# Patient Record
Sex: MALE | Race: WHITE | Employment: FULL TIME | URBAN - METROPOLITAN AREA
[De-identification: names, ages, dates, MRNs, and addresses within clinical notes are randomized per-mention and may not be internally consistent; named-entity substitution may affect disease eponyms.]

---

## 2017-01-25 RX ORDER — METOPROLOL SUCCINATE 50 MG/1
TABLET, EXTENDED RELEASE ORAL
Qty: 30 TABLET | Refills: 5 | Status: SHIPPED | OUTPATIENT
Start: 2017-01-25 | End: 2017-07-15

## 2017-01-25 RX ORDER — ATORVASTATIN CALCIUM 10 MG/1
TABLET, FILM COATED ORAL
Qty: 30 TABLET | Refills: 5 | Status: SHIPPED | OUTPATIENT
Start: 2017-01-25 | End: 2017-07-15

## 2017-01-25 RX ORDER — TAMSULOSIN HYDROCHLORIDE 0.4 MG/1
CAPSULE ORAL
Qty: 30 CAPSULE | Refills: 5 | Status: SHIPPED | OUTPATIENT
Start: 2017-01-25 | End: 2017-07-12 | Stop reason: SDUPTHER

## 2017-02-28 ENCOUNTER — OFFICE VISIT (OUTPATIENT)
Dept: FAMILY MEDICINE CLINIC | Age: 67
End: 2017-02-28

## 2017-02-28 VITALS
HEART RATE: 90 BPM | TEMPERATURE: 97.4 F | RESPIRATION RATE: 12 BRPM | WEIGHT: 275.2 LBS | DIASTOLIC BLOOD PRESSURE: 62 MMHG | HEIGHT: 69 IN | BODY MASS INDEX: 40.76 KG/M2 | SYSTOLIC BLOOD PRESSURE: 110 MMHG

## 2017-02-28 DIAGNOSIS — K57.92 ACUTE DIVERTICULITIS OF INTESTINE: Primary | ICD-10-CM

## 2017-02-28 DIAGNOSIS — K57.30 DIVERTICULOSIS OF LARGE INTESTINE WITHOUT HEMORRHAGE: ICD-10-CM

## 2017-02-28 DIAGNOSIS — E66.01 MORBID OBESITY DUE TO EXCESS CALORIES (HCC): ICD-10-CM

## 2017-02-28 DIAGNOSIS — E78.5 DYSLIPIDEMIA: ICD-10-CM

## 2017-02-28 DIAGNOSIS — I25.10 CAD IN NATIVE ARTERY: ICD-10-CM

## 2017-02-28 DIAGNOSIS — I10 ESSENTIAL HYPERTENSION: ICD-10-CM

## 2017-02-28 PROCEDURE — 99214 OFFICE O/P EST MOD 30 MIN: CPT | Performed by: FAMILY MEDICINE

## 2017-02-28 RX ORDER — HYDROCODONE BITARTRATE AND ACETAMINOPHEN 5; 325 MG/1; MG/1
1 TABLET ORAL EVERY 6 HOURS PRN
Qty: 60 TABLET | Refills: 0 | Status: SHIPPED | OUTPATIENT
Start: 2017-02-28 | End: 2017-07-15 | Stop reason: SDUPTHER

## 2017-02-28 RX ORDER — SULFAMETHOXAZOLE AND TRIMETHOPRIM 800; 160 MG/1; MG/1
1 TABLET ORAL 2 TIMES DAILY
Qty: 20 TABLET | Refills: 0 | Status: SHIPPED | OUTPATIENT
Start: 2017-02-28 | End: 2017-03-10

## 2017-02-28 RX ORDER — METRONIDAZOLE 500 MG/1
500 TABLET ORAL 3 TIMES DAILY
Qty: 30 TABLET | Refills: 0 | Status: SHIPPED | OUTPATIENT
Start: 2017-02-28 | End: 2017-03-10

## 2017-02-28 RX ORDER — LISINOPRIL 20 MG/1
20 TABLET ORAL 2 TIMES DAILY
COMMUNITY

## 2017-02-28 ASSESSMENT — PATIENT HEALTH QUESTIONNAIRE - PHQ9
SUM OF ALL RESPONSES TO PHQ QUESTIONS 1-9: 0
SUM OF ALL RESPONSES TO PHQ9 QUESTIONS 1 & 2: 0
2. FEELING DOWN, DEPRESSED OR HOPELESS: 0
1. LITTLE INTEREST OR PLEASURE IN DOING THINGS: 0

## 2017-07-12 RX ORDER — TAMSULOSIN HYDROCHLORIDE 0.4 MG/1
CAPSULE ORAL
Qty: 30 CAPSULE | Refills: 5 | Status: SHIPPED | OUTPATIENT
Start: 2017-07-12 | End: 2018-01-18 | Stop reason: SDUPTHER

## 2017-07-15 ENCOUNTER — OFFICE VISIT (OUTPATIENT)
Dept: FAMILY MEDICINE CLINIC | Age: 67
End: 2017-07-15

## 2017-07-15 VITALS
HEART RATE: 68 BPM | DIASTOLIC BLOOD PRESSURE: 70 MMHG | RESPIRATION RATE: 16 BRPM | WEIGHT: 262 LBS | HEIGHT: 69 IN | BODY MASS INDEX: 38.8 KG/M2 | TEMPERATURE: 98.8 F | SYSTOLIC BLOOD PRESSURE: 110 MMHG

## 2017-07-15 DIAGNOSIS — R58 ECCHYMOSIS OF FOREARM: ICD-10-CM

## 2017-07-15 DIAGNOSIS — R58 ECCHYMOSIS OF FOREARM: Primary | ICD-10-CM

## 2017-07-15 DIAGNOSIS — T50.905A MEDICATION SIDE EFFECT, INITIAL ENCOUNTER: ICD-10-CM

## 2017-07-15 LAB
APTT: 27.4 SEC (ref 21.6–35.4)
BASOPHILS ABSOLUTE: 0 K/UL (ref 0–0.2)
BASOPHILS RELATIVE PERCENT: 0.4 %
EOSINOPHILS ABSOLUTE: 0.1 K/UL (ref 0–0.7)
EOSINOPHILS RELATIVE PERCENT: 0.9 %
HCT VFR BLD CALC: 45 % (ref 42–52)
HEMOGLOBIN: 14.9 G/DL (ref 14–18)
INR BLD: 1
LYMPHOCYTES ABSOLUTE: 1.9 K/UL (ref 1–4.8)
LYMPHOCYTES RELATIVE PERCENT: 20.4 %
MCH RBC QN AUTO: 28.5 PG (ref 27–31.3)
MCHC RBC AUTO-ENTMCNC: 33.2 % (ref 33–37)
MCV RBC AUTO: 86 FL (ref 80–100)
MONOCYTES ABSOLUTE: 1 K/UL (ref 0.2–0.8)
MONOCYTES RELATIVE PERCENT: 10.3 %
NEUTROPHILS ABSOLUTE: 6.4 K/UL (ref 1.4–6.5)
NEUTROPHILS RELATIVE PERCENT: 68 %
PDW BLD-RTO: 14.7 % (ref 11.5–14.5)
PLATELET # BLD: 226 K/UL (ref 130–400)
PROTHROMBIN TIME: 10.5 SEC (ref 8.1–13.7)
RBC # BLD: 5.23 M/UL (ref 4.7–6.1)
WBC # BLD: 9.4 K/UL (ref 4.8–10.8)

## 2017-07-15 PROCEDURE — 99213 OFFICE O/P EST LOW 20 MIN: CPT | Performed by: FAMILY MEDICINE

## 2017-07-15 RX ORDER — ATORVASTATIN CALCIUM 40 MG/1
1 TABLET, FILM COATED ORAL NIGHTLY
Refills: 11 | COMMUNITY
Start: 2017-07-08 | End: 2017-10-25 | Stop reason: ALTCHOICE

## 2017-07-15 RX ORDER — HYDROCODONE BITARTRATE AND ACETAMINOPHEN 5; 325 MG/1; MG/1
1 TABLET ORAL EVERY 6 HOURS PRN
Qty: 60 TABLET | Refills: 0 | Status: SHIPPED | OUTPATIENT
Start: 2017-07-15 | End: 2017-10-25 | Stop reason: SDUPTHER

## 2017-07-15 RX ORDER — CLOPIDOGREL BISULFATE 75 MG/1
1 TABLET ORAL DAILY
Refills: 5 | COMMUNITY
Start: 2017-07-08 | End: 2018-01-25 | Stop reason: ALTCHOICE

## 2017-07-15 RX ORDER — METOPROLOL SUCCINATE 100 MG/1
1 TABLET, EXTENDED RELEASE ORAL NIGHTLY
Refills: 3 | COMMUNITY
Start: 2017-07-08

## 2017-07-15 RX ORDER — IPRATROPIUM/ALBUTEROL SULFATE 20-100 MCG
MIST INHALER (GRAM) INHALATION
Refills: 3 | COMMUNITY
Start: 2017-07-08 | End: 2017-07-15

## 2017-07-25 ENCOUNTER — OFFICE VISIT (OUTPATIENT)
Dept: FAMILY MEDICINE CLINIC | Age: 67
End: 2017-07-25

## 2017-07-25 VITALS
DIASTOLIC BLOOD PRESSURE: 74 MMHG | HEART RATE: 76 BPM | RESPIRATION RATE: 16 BRPM | BODY MASS INDEX: 38.66 KG/M2 | TEMPERATURE: 98.1 F | WEIGHT: 261 LBS | SYSTOLIC BLOOD PRESSURE: 122 MMHG | HEIGHT: 69 IN

## 2017-07-25 DIAGNOSIS — S56.911D FOREARM STRAIN, RIGHT, SUBSEQUENT ENCOUNTER: Primary | ICD-10-CM

## 2017-07-25 DIAGNOSIS — R58 ECCHYMOSIS OF FOREARM: ICD-10-CM

## 2017-07-25 PROCEDURE — 99213 OFFICE O/P EST LOW 20 MIN: CPT | Performed by: FAMILY MEDICINE

## 2017-10-25 ENCOUNTER — OFFICE VISIT (OUTPATIENT)
Dept: FAMILY MEDICINE CLINIC | Age: 67
End: 2017-10-25

## 2017-10-25 VITALS
BODY MASS INDEX: 40.49 KG/M2 | OXYGEN SATURATION: 95 % | DIASTOLIC BLOOD PRESSURE: 72 MMHG | HEIGHT: 69 IN | SYSTOLIC BLOOD PRESSURE: 128 MMHG | HEART RATE: 63 BPM | TEMPERATURE: 97.8 F | WEIGHT: 273.4 LBS

## 2017-10-25 DIAGNOSIS — M70.61 GREATER TROCHANTERIC BURSITIS OF RIGHT HIP: ICD-10-CM

## 2017-10-25 DIAGNOSIS — E78.5 DYSLIPIDEMIA: ICD-10-CM

## 2017-10-25 DIAGNOSIS — I48.19 PERSISTENT ATRIAL FIBRILLATION (HCC): ICD-10-CM

## 2017-10-25 DIAGNOSIS — I10 ESSENTIAL HYPERTENSION: Primary | ICD-10-CM

## 2017-10-25 DIAGNOSIS — I25.10 CAD IN NATIVE ARTERY: ICD-10-CM

## 2017-10-25 PROCEDURE — 20610 DRAIN/INJ JOINT/BURSA W/O US: CPT | Performed by: FAMILY MEDICINE

## 2017-10-25 PROCEDURE — 99214 OFFICE O/P EST MOD 30 MIN: CPT | Performed by: FAMILY MEDICINE

## 2017-10-25 RX ORDER — METHYLPREDNISOLONE ACETATE 40 MG/ML
40 INJECTION, SUSPENSION INTRA-ARTICULAR; INTRALESIONAL; INTRAMUSCULAR; SOFT TISSUE ONCE
Status: COMPLETED | OUTPATIENT
Start: 2017-10-25 | End: 2017-10-25

## 2017-10-25 RX ORDER — LIDOCAINE HYDROCHLORIDE 10 MG/ML
2 INJECTION, SOLUTION INFILTRATION; PERINEURAL ONCE
Status: COMPLETED | OUTPATIENT
Start: 2017-10-25 | End: 2017-10-25

## 2017-10-25 RX ORDER — HYDROCODONE BITARTRATE AND ACETAMINOPHEN 5; 325 MG/1; MG/1
1 TABLET ORAL EVERY 6 HOURS PRN
Qty: 60 TABLET | Refills: 0 | Status: SHIPPED | OUTPATIENT
Start: 2017-10-25 | End: 2018-05-04 | Stop reason: SDUPTHER

## 2017-10-25 RX ADMIN — LIDOCAINE HYDROCHLORIDE 2 ML: 10 INJECTION, SOLUTION INFILTRATION; PERINEURAL at 10:40

## 2017-10-25 RX ADMIN — METHYLPREDNISOLONE ACETATE 40 MG: 40 INJECTION, SUSPENSION INTRA-ARTICULAR; INTRALESIONAL; INTRAMUSCULAR; SOFT TISSUE at 10:42

## 2017-10-25 NOTE — PROGRESS NOTES
Chief Complaint   Patient presents with    3 Month Follow-Up     LOV 07/25/17    Hypertension    Bleeding/Bruising     RT forearm    Discuss Medications     HPI: Tanika Cowan is a 79 y.o. male presenting for follow-up of HTN and Ecchymosis of RT forearm. I last saw the patient 3 months ago. He is bruising but not as severe as he was. He is getting some white phlegm in the back of his throat that is lumpy. He notes that it will inhibit food and get caught. It may cause him to vomit. He does complain of pain in the right hip. It does affect his sleeping. Pain is in the right greater trochanteric bursa. He is scheduled to see his cardiologist on 11-7. His right forearm has improved but there is still some mild discoloration of the skin. Past Medical History:   Diagnosis Date    Chest pain 5/20/2014    Diverticulosis     Dyslipidemia 5/20/2014    Dyspnea on exertion     Family history of premature CAD 5/20/2014    Fatty infiltration of liver     Hypertension     Obesity        Past Surgical History:   Procedure Laterality Date    CARPAL TUNNEL RELEASE  2002    LEFT    CATARACT REMOVAL WITH IMPLANT Right 05/2016    Dr. Caroline Romero COLONOSCOPY  12/29/2016    [facility]         family history includes Cancer in his father; Other in an other family member. Social History     Social History    Marital status:      Spouse name: N/A    Number of children: N/A    Years of education: N/A     Occupational History    Not on file.      Social History Main Topics    Smoking status: Former Smoker     Packs/day: 2.00     Years: 25.00     Types: Cigarettes     Quit date: 4/20/2000    Smokeless tobacco: Never Used    Alcohol use No    Drug use: No    Sexual activity: Not on file     Other Topics Concern    Not on file     Social History Narrative    No narrative on file       No Known Allergies    Review of Systems - General ROS: negative  Psychological ROS: negative  ENT ROS: positive for - sinus drainage with thick white mucus causing vomiting and inability to eat  Hematological and Lymphatic ROS: positive for excessive bruising  Respiratory ROS: positive for - shortness of breath  Cardiovascular ROS: no chest pain or dyspnea on exertion  Gastrointestinal ROS: no abdominal pain, change in bowel habits, or black or bloody stools  Genito-Urinary ROS: no dysuria, trouble voiding, or hematuria  Musculoskeletal ROS: negative  Neurological ROS: no TIA or stroke symptoms  Dermatological ROS: negative    Vitals:    10/25/17 0913 10/25/17 0938   BP: 120/78 128/72   Pulse: 63    Temp: 97.8 °F (36.6 °C)    TempSrc: Temporal    SpO2: 95%    Weight: 273 lb 6.4 oz (124 kg)    Height: 5' 9\" (1.753 m)      Physical Examination: General appearance - alert, well appearing, and in no distress. Patient is obese. Skin - normal coloration and turgor, no rashes, no suspicious skin lesions noted  Normal  Neck - supple, no significant adenopathy, bilateral symmetric anterior adenopathy, carotids upstroke normal bilaterally, no bruits  Chest - clear to auscultation, no wheezes, rales or rhonchi, symmetric air entry  Heart - normal rate, regular rhythm, normal S1, S2, no murmurs, rubs, clicks or gallops  Abdomen - soft, nondistended, nontender. No masses or organomegaly noted. Bowel sounds are positive. Obese. Right hip - normal straight leg raising except tight hamstrings. Pain to palpation over the right lateral hip at the greater trochanter. Extremities - peripheral pulses normal, no pedal edema, no clubbing or cyanosis. Mild tan discoloration of the volar right forearm. Procedure: Discussed procedure with patient as well as risks and complications and patient agreed to proceed. Right greater trochanter was prepped with alcohol/betadine. Then the injection was given into the greater trochanteric bursa using a lateral approach with 40 mg of depomedrol and 2 cc of 1% lidocaine with a 22 g 1.5 inch needle. Band-aid was placed. Patient tolerated procedure well. Patient did notice some improvement in his pain after the injection. I have reviewed the following diagnostic data: NA.  Please see report for additional information. ASSESSMENT:  1. Essential hypertension     2. Greater trochanteric bursitis of right hip  MI ARTHROCENTESIS ASPIR&/INJ MAJOR JT/BURSA W/O US   3. Class 3 obesity due to excess calories without serious comorbidity with body mass index (BMI) of 40.0 to 44.9 in adult ([facility] [address]     4. Dyslipidemia     5. CAD in native artery     6. Persistent atrial fibrillation ([facility]         PLAN:    Orders Placed This Encounter   Procedures    MI ARTHROCENTESIS ASPIR&/INJ MAJOR JT/BURSA W/O US     Patient was instructed to rest the hip over the next 48 hrs. It will take that period of time for the corticosteroid to become effective. Patient may notice a temporary improvement due to the lidocaine which will only last several hours. Follow-up if symptoms persist or worsen otherwise as needed. I have instructed the patient to follow-up with me in 3 months or sooner if needed. Patient was encouraged to continue low cholesterol diet, weight loss and exercise as tolerated. He will send us a copy of his labs obtained from the [facility].

## 2018-01-19 RX ORDER — TAMSULOSIN HYDROCHLORIDE 0.4 MG/1
CAPSULE ORAL
Qty: 30 CAPSULE | Refills: 5 | Status: SHIPPED | OUTPATIENT
Start: 2018-01-19 | End: 2018-01-25

## 2018-01-25 ENCOUNTER — OFFICE VISIT (OUTPATIENT)
Dept: FAMILY MEDICINE CLINIC | Age: 68
End: 2018-01-25
Payer: COMMERCIAL

## 2018-01-25 VITALS
HEART RATE: 66 BPM | SYSTOLIC BLOOD PRESSURE: 128 MMHG | RESPIRATION RATE: 20 BRPM | BODY MASS INDEX: 41.32 KG/M2 | DIASTOLIC BLOOD PRESSURE: 80 MMHG | HEIGHT: 69 IN | WEIGHT: 279 LBS | TEMPERATURE: 97.9 F

## 2018-01-25 DIAGNOSIS — Z12.5 SCREENING PSA (PROSTATE SPECIFIC ANTIGEN): ICD-10-CM

## 2018-01-25 DIAGNOSIS — I10 ESSENTIAL HYPERTENSION: ICD-10-CM

## 2018-01-25 DIAGNOSIS — Z23 NEED FOR PNEUMOCOCCAL VACCINATION: ICD-10-CM

## 2018-01-25 DIAGNOSIS — I48.19 PERSISTENT ATRIAL FIBRILLATION (HCC): ICD-10-CM

## 2018-01-25 DIAGNOSIS — Z13.220 SCREENING, LIPID: ICD-10-CM

## 2018-01-25 DIAGNOSIS — K57.30 DIVERTICULOSIS OF LARGE INTESTINE WITHOUT HEMORRHAGE: ICD-10-CM

## 2018-01-25 DIAGNOSIS — I10 ESSENTIAL HYPERTENSION: Primary | ICD-10-CM

## 2018-01-25 LAB
ALBUMIN SERPL-MCNC: 3.9 G/DL (ref 3.9–4.9)
ALP BLD-CCNC: 90 U/L (ref 35–104)
ALT SERPL-CCNC: 26 U/L (ref 0–41)
ANION GAP SERPL CALCULATED.3IONS-SCNC: 16 MEQ/L (ref 7–13)
AST SERPL-CCNC: 23 U/L (ref 0–40)
BASOPHILS ABSOLUTE: 0.1 K/UL (ref 0–0.2)
BASOPHILS RELATIVE PERCENT: 0.9 %
BILIRUB SERPL-MCNC: 0.8 MG/DL (ref 0–1.2)
BUN BLDV-MCNC: 18 MG/DL (ref 8–23)
CALCIUM SERPL-MCNC: 9.1 MG/DL (ref 8.6–10.2)
CHLORIDE BLD-SCNC: 103 MEQ/L (ref 98–107)
CHOLESTEROL, TOTAL: 133 MG/DL (ref 0–199)
CO2: 26 MEQ/L (ref 22–29)
CREAT SERPL-MCNC: 0.77 MG/DL (ref 0.7–1.2)
EOSINOPHILS ABSOLUTE: 0.1 K/UL (ref 0–0.7)
EOSINOPHILS RELATIVE PERCENT: 1.3 %
GFR AFRICAN AMERICAN: >60
GFR NON-AFRICAN AMERICAN: >60
GLOBULIN: 2.6 G/DL (ref 2.3–3.5)
GLUCOSE BLD-MCNC: 99 MG/DL (ref 74–109)
HCT VFR BLD CALC: 45.7 % (ref 42–52)
HDLC SERPL-MCNC: 44 MG/DL (ref 40–59)
HEMOGLOBIN: 15 G/DL (ref 14–18)
LDL CHOLESTEROL CALCULATED: 70 MG/DL (ref 0–129)
LYMPHOCYTES ABSOLUTE: 1.8 K/UL (ref 1–4.8)
LYMPHOCYTES RELATIVE PERCENT: 20.3 %
MCH RBC QN AUTO: 28.4 PG (ref 27–31.3)
MCHC RBC AUTO-ENTMCNC: 32.9 % (ref 33–37)
MCV RBC AUTO: 86.4 FL (ref 80–100)
MONOCYTES ABSOLUTE: 0.9 K/UL (ref 0.2–0.8)
MONOCYTES RELATIVE PERCENT: 10.5 %
NEUTROPHILS ABSOLUTE: 6 K/UL (ref 1.4–6.5)
NEUTROPHILS RELATIVE PERCENT: 67 %
PDW BLD-RTO: 15.2 % (ref 11.5–14.5)
PLATELET # BLD: 235 K/UL (ref 130–400)
POTASSIUM SERPL-SCNC: 4.6 MEQ/L (ref 3.5–5.1)
PROSTATE SPECIFIC ANTIGEN: 1.45 NG/ML (ref 0–5.4)
RBC # BLD: 5.29 M/UL (ref 4.7–6.1)
SODIUM BLD-SCNC: 145 MEQ/L (ref 132–144)
TOTAL PROTEIN: 6.5 G/DL (ref 6.4–8.1)
TRIGL SERPL-MCNC: 96 MG/DL (ref 0–200)
WBC # BLD: 8.9 K/UL (ref 4.8–10.8)

## 2018-01-25 PROCEDURE — 90670 PCV13 VACCINE IM: CPT | Performed by: FAMILY MEDICINE

## 2018-01-25 PROCEDURE — 99214 OFFICE O/P EST MOD 30 MIN: CPT | Performed by: FAMILY MEDICINE

## 2018-01-25 PROCEDURE — 90471 IMMUNIZATION ADMIN: CPT | Performed by: FAMILY MEDICINE

## 2018-01-25 RX ORDER — ATORVASTATIN CALCIUM 10 MG/1
10 TABLET, FILM COATED ORAL NIGHTLY
COMMUNITY
End: 2018-01-25 | Stop reason: CLARIF

## 2018-01-25 RX ORDER — ATORVASTATIN CALCIUM 40 MG/1
1 TABLET, FILM COATED ORAL NIGHTLY
Refills: 11 | COMMUNITY
Start: 2018-01-17

## 2018-01-25 RX ORDER — OSELTAMIVIR PHOSPHATE 75 MG/1
75 CAPSULE ORAL 2 TIMES DAILY
Qty: 10 CAPSULE | Refills: 0 | Status: SHIPPED | OUTPATIENT
Start: 2018-01-25 | End: 2018-01-30

## 2018-01-25 NOTE — PROGRESS NOTES
Known Allergies    Review of Systems - General ROS: negative  Psychological ROS: negative  ENT ROS: positive for - nasal congestion and nasal discharge  Hematological and Lymphatic ROS: negative  Respiratory ROS: no cough, shortness of breath, or wheezing  Cardiovascular ROS: no chest pain or dyspnea on exertion  Gastrointestinal ROS: positive for - nausea/vomiting  Genito-Urinary ROS: no dysuria, trouble voiding, or hematuria  Musculoskeletal ROS: positive for - joint pain  Neurological ROS: no TIA or stroke symptoms  Dermatological ROS: negative    Vitals:    01/25/18 0921 01/25/18 0948   BP: 130/76 128/80   Pulse: 66    Resp: 20    Temp: 97.9 °F (36.6 °C)    TempSrc: Temporal    Weight: 279 lb (126.6 kg)    Height: 5' 9\" (1.753 m)      Physical Examination: General appearance - alert, well appearing, and in no distress. Patient is obese. Skin - normal coloration and turgor, no rashes, no suspicious skin lesions noted  Normal  Ears - bilateral TM's and external ear canals normal  Nose - normal and patent, no erythema, discharge or polyps  Mouth - mucous membranes moist, pharynx normal without lesions  Neck - supple, no significant adenopathy, bilateral symmetric anterior adenopathy, carotids upstroke normal bilaterally, no bruits  Chest - clear to auscultation, no wheezes, rales or rhonchi, symmetric air entry  Heart - normal rate, regular rhythm, normal S1, S2, no murmurs, rubs, clicks or gallops  Abdomen - soft, nondistended, nontender. No masses or organomegaly noted. Bowel sounds are positive. Obese. Extremities - peripheral pulses normal, no pedal edema, no clubbing or cyanosis. I have reviewed the following diagnostic data: NA.  Please see report for additional information. ASSESSMENT:  1. Essential hypertension  CBC Auto Differential    Comprehensive Metabolic Panel   2.  Diverticulosis of large intestine without hemorrhage - moderately severe involving the ascending, descending and sigmoid colon

## 2018-05-04 ENCOUNTER — OFFICE VISIT (OUTPATIENT)
Dept: FAMILY MEDICINE CLINIC | Age: 68
End: 2018-05-04
Payer: COMMERCIAL

## 2018-05-04 VITALS
TEMPERATURE: 97.8 F | DIASTOLIC BLOOD PRESSURE: 76 MMHG | OXYGEN SATURATION: 96 % | SYSTOLIC BLOOD PRESSURE: 118 MMHG | HEIGHT: 69 IN | HEART RATE: 62 BPM | BODY MASS INDEX: 40.11 KG/M2 | WEIGHT: 270.8 LBS

## 2018-05-04 DIAGNOSIS — M54.16 RIGHT LUMBAR RADICULOPATHY: ICD-10-CM

## 2018-05-04 DIAGNOSIS — R13.14 PHARYNGOESOPHAGEAL DYSPHAGIA: Primary | ICD-10-CM

## 2018-05-04 DIAGNOSIS — R06.09 DYSPNEA ON EXERTION: ICD-10-CM

## 2018-05-04 DIAGNOSIS — I10 ESSENTIAL HYPERTENSION: ICD-10-CM

## 2018-05-04 DIAGNOSIS — M70.61 GREATER TROCHANTERIC BURSITIS OF RIGHT HIP: ICD-10-CM

## 2018-05-04 DIAGNOSIS — I48.19 PERSISTENT ATRIAL FIBRILLATION (HCC): ICD-10-CM

## 2018-05-04 PROCEDURE — 99214 OFFICE O/P EST MOD 30 MIN: CPT | Performed by: FAMILY MEDICINE

## 2018-05-04 RX ORDER — HYDROCODONE BITARTRATE AND ACETAMINOPHEN 5; 325 MG/1; MG/1
1 TABLET ORAL EVERY 6 HOURS PRN
Qty: 28 TABLET | Refills: 0 | Status: SHIPPED | OUTPATIENT
Start: 2018-05-04 | End: 2018-08-06 | Stop reason: SDUPTHER

## 2018-05-04 ASSESSMENT — PATIENT HEALTH QUESTIONNAIRE - PHQ9
SUM OF ALL RESPONSES TO PHQ9 QUESTIONS 1 & 2: 0
1. LITTLE INTEREST OR PLEASURE IN DOING THINGS: 0
2. FEELING DOWN, DEPRESSED OR HOPELESS: 0
SUM OF ALL RESPONSES TO PHQ QUESTIONS 1-9: 0

## 2018-05-14 ENCOUNTER — TELEPHONE (OUTPATIENT)
Dept: FAMILY MEDICINE CLINIC | Age: 68
End: 2018-05-14

## 2018-08-06 ENCOUNTER — OFFICE VISIT (OUTPATIENT)
Dept: FAMILY MEDICINE CLINIC | Age: 68
End: 2018-08-06
Payer: COMMERCIAL

## 2018-08-06 VITALS
TEMPERATURE: 98.5 F | WEIGHT: 266.6 LBS | DIASTOLIC BLOOD PRESSURE: 68 MMHG | BODY MASS INDEX: 39.49 KG/M2 | HEIGHT: 69 IN | HEART RATE: 60 BPM | SYSTOLIC BLOOD PRESSURE: 120 MMHG | RESPIRATION RATE: 20 BRPM

## 2018-08-06 DIAGNOSIS — M10.042 ACUTE IDIOPATHIC GOUT OF LEFT HAND: ICD-10-CM

## 2018-08-06 DIAGNOSIS — M54.16 RIGHT LUMBAR RADICULOPATHY: ICD-10-CM

## 2018-08-06 DIAGNOSIS — E66.01 CLASS 3 SEVERE OBESITY DUE TO EXCESS CALORIES WITHOUT SERIOUS COMORBIDITY WITH BODY MASS INDEX (BMI) OF 40.0 TO 44.9 IN ADULT (HCC): ICD-10-CM

## 2018-08-06 DIAGNOSIS — N39.43 URINARY INCONTINENCE, POST-VOID DRIBBLING: ICD-10-CM

## 2018-08-06 DIAGNOSIS — M70.61 GREATER TROCHANTERIC BURSITIS OF RIGHT HIP: ICD-10-CM

## 2018-08-06 DIAGNOSIS — R35.0 URINARY FREQUENCY: ICD-10-CM

## 2018-08-06 DIAGNOSIS — I48.19 PERSISTENT ATRIAL FIBRILLATION (HCC): ICD-10-CM

## 2018-08-06 DIAGNOSIS — E78.5 DYSLIPIDEMIA: ICD-10-CM

## 2018-08-06 DIAGNOSIS — I10 ESSENTIAL HYPERTENSION: Primary | ICD-10-CM

## 2018-08-06 DIAGNOSIS — I25.10 CAD IN NATIVE ARTERY: ICD-10-CM

## 2018-08-06 PROCEDURE — 99214 OFFICE O/P EST MOD 30 MIN: CPT | Performed by: FAMILY MEDICINE

## 2018-08-06 RX ORDER — HYDROCODONE BITARTRATE AND ACETAMINOPHEN 5; 325 MG/1; MG/1
1 TABLET ORAL EVERY 6 HOURS PRN
Qty: 28 TABLET | Refills: 0 | Status: SHIPPED | OUTPATIENT
Start: 2018-08-06 | End: 2018-08-13

## 2018-08-06 RX ORDER — COLCHICINE 0.6 MG/1
0.6 TABLET ORAL 2 TIMES DAILY
Qty: 30 TABLET | Refills: 1 | Status: SHIPPED | OUTPATIENT
Start: 2018-08-06 | End: 2018-08-15 | Stop reason: SDUPTHER

## 2018-08-06 RX ORDER — COLCHICINE 0.6 MG/1
0.6 TABLET ORAL 2 TIMES DAILY
Qty: 30 TABLET | Refills: 2 | Status: CANCELLED | OUTPATIENT
Start: 2018-08-06 | End: 2019-08-06

## 2018-08-06 NOTE — PROGRESS NOTES
auscultation, no wheezes, rales or rhonchi, symmetric air entry  Heart - normal rate, regular rhythm, normal S1, S2, no murmurs, rubs, clicks or gallops  Abdomen - soft, nondistended, nontender. No masses or organomegaly noted. Bowel sounds are positive. Obese. Extremities - peripheral pulses normal, no pedal edema, no clubbing or cyanosis. His left index finger is unremarkable today. I have reviewed the following diagnostic data: NA.  Please see report for additional information. ASSESSMENT:   Diagnosis Orders   1. Essential hypertension     2. Dyslipidemia     3. CAD in native artery     4. Class 3 severe obesity due to excess calories without serious comorbidity with body mass index (BMI) of 40.0 to 44.9 in adult ([facility])     5. Persistent atrial fibrillation ([facility] [address]     6. Urinary frequency     7. Urinary incontinence, post-void dribbling     8. Greater trochanteric bursitis of right hip  HYDROcodone-acetaminophen (NORCO) 5-325 MG per tablet   9. Right lumbar radiculopathy  HYDROcodone-acetaminophen (NORCO) 5-325 MG per tablet   10. Acute idiopathic gout of left hand       PLAN:    No orders of the defined types were placed in this encounter. Orders Placed This Encounter   Medications    colchicine (COLCRYS) 0.6 MG tablet     Sig: Take 1 tablet by mouth 2 times daily     Dispense:  30 tablet     Refill:  1    HYDROcodone-acetaminophen (NORCO) 5-325 MG per tablet     Sig: Take 1 tablet by mouth every 6 hours as needed for Pain for up to 7 days. .     Dispense:  28 tablet     Refill:  0     Patient was advised to discontinue the indomethacin due to his history of heart disease and the fact that he is on Eliquis. He may use the colchicine if needed. Patient will follow up with the [facility] as scheduled. He will have them address his urinary symptoms as well. He may well need a renal and bladder ultrasound with PVR for further evaluation. Patient did discontinue his tamsulosin.   He may return here as

## 2018-08-16 RX ORDER — COLCHICINE 0.6 MG/1
0.6 TABLET ORAL 2 TIMES DAILY
Qty: 30 TABLET | Refills: 1 | Status: SHIPPED | OUTPATIENT
Start: 2018-08-16 | End: 2018-10-15 | Stop reason: SDUPTHER

## 2018-10-15 RX ORDER — COLCHICINE 0.6 MG/1
TABLET ORAL
Qty: 60 TABLET | Refills: 3 | Status: SHIPPED | OUTPATIENT
Start: 2018-10-15

## 2019-02-13 ENCOUNTER — OFFICE VISIT (OUTPATIENT)
Dept: FAMILY MEDICINE CLINIC | Age: 69
End: 2019-02-13
Payer: MEDICARE

## 2019-02-13 VITALS
RESPIRATION RATE: 16 BRPM | TEMPERATURE: 98.7 F | BODY MASS INDEX: 39.43 KG/M2 | DIASTOLIC BLOOD PRESSURE: 62 MMHG | WEIGHT: 266.2 LBS | HEIGHT: 69 IN | HEART RATE: 68 BPM | SYSTOLIC BLOOD PRESSURE: 112 MMHG

## 2019-02-13 DIAGNOSIS — R10.11 ABDOMINAL PAIN, RIGHT UPPER QUADRANT: ICD-10-CM

## 2019-02-13 DIAGNOSIS — R13.14 PHARYNGOESOPHAGEAL DYSPHAGIA: Primary | ICD-10-CM

## 2019-02-13 DIAGNOSIS — I10 ESSENTIAL HYPERTENSION: ICD-10-CM

## 2019-02-13 DIAGNOSIS — I48.19 PERSISTENT ATRIAL FIBRILLATION (HCC): ICD-10-CM

## 2019-02-13 DIAGNOSIS — N39.43 URINARY INCONTINENCE, POST-VOID DRIBBLING: ICD-10-CM

## 2019-02-13 DIAGNOSIS — E66.01 CLASS 3 SEVERE OBESITY DUE TO EXCESS CALORIES WITHOUT SERIOUS COMORBIDITY WITH BODY MASS INDEX (BMI) OF 40.0 TO 44.9 IN ADULT (HCC): ICD-10-CM

## 2019-02-13 DIAGNOSIS — K76.0 FATTY INFILTRATION OF LIVER: ICD-10-CM

## 2019-02-13 DIAGNOSIS — E78.5 DYSLIPIDEMIA: ICD-10-CM

## 2019-02-13 PROCEDURE — 1123F ACP DISCUSS/DSCN MKR DOCD: CPT | Performed by: FAMILY MEDICINE

## 2019-02-13 PROCEDURE — 99214 OFFICE O/P EST MOD 30 MIN: CPT | Performed by: FAMILY MEDICINE

## 2019-02-13 PROCEDURE — 3017F COLORECTAL CA SCREEN DOC REV: CPT | Performed by: FAMILY MEDICINE

## 2019-02-13 PROCEDURE — G8484 FLU IMMUNIZE NO ADMIN: HCPCS | Performed by: FAMILY MEDICINE

## 2019-02-13 PROCEDURE — 4040F PNEUMOC VAC/ADMIN/RCVD: CPT | Performed by: FAMILY MEDICINE

## 2019-02-13 PROCEDURE — G8598 ASA/ANTIPLAT THER USED: HCPCS | Performed by: FAMILY MEDICINE

## 2019-02-13 PROCEDURE — G8417 CALC BMI ABV UP PARAM F/U: HCPCS | Performed by: FAMILY MEDICINE

## 2019-02-13 PROCEDURE — 1101F PT FALLS ASSESS-DOCD LE1/YR: CPT | Performed by: FAMILY MEDICINE

## 2019-02-13 PROCEDURE — G8427 DOCREV CUR MEDS BY ELIG CLIN: HCPCS | Performed by: FAMILY MEDICINE

## 2019-02-13 PROCEDURE — 1036F TOBACCO NON-USER: CPT | Performed by: FAMILY MEDICINE

## 2019-02-13 RX ORDER — HYDROCODONE BITARTRATE AND ACETAMINOPHEN 5; 325 MG/1; MG/1
1 TABLET ORAL EVERY 6 HOURS PRN
COMMUNITY

## 2019-02-13 RX ORDER — BUDESONIDE AND FORMOTEROL FUMARATE DIHYDRATE 160; 4.5 UG/1; UG/1
2 AEROSOL RESPIRATORY (INHALATION) 2 TIMES DAILY
COMMUNITY

## 2019-02-13 ASSESSMENT — ENCOUNTER SYMPTOMS
DIARRHEA: 0
VOICE CHANGE: 0
CHEST TIGHTNESS: 0
ABDOMINAL PAIN: 0
VOMITING: 0
NAUSEA: 0
WHEEZING: 0
TROUBLE SWALLOWING: 1
SINUS PAIN: 0
BLOOD IN STOOL: 0
RHINORRHEA: 0
COLOR CHANGE: 0
SORE THROAT: 0
SHORTNESS OF BREATH: 0
CONSTIPATION: 0
COUGH: 0

## 2019-02-13 ASSESSMENT — PATIENT HEALTH QUESTIONNAIRE - PHQ9
SUM OF ALL RESPONSES TO PHQ QUESTIONS 1-9: 0
SUM OF ALL RESPONSES TO PHQ QUESTIONS 1-9: 0
1. LITTLE INTEREST OR PLEASURE IN DOING THINGS: 0
SUM OF ALL RESPONSES TO PHQ9 QUESTIONS 1 & 2: 0
2. FEELING DOWN, DEPRESSED OR HOPELESS: 0

## 2019-02-14 LAB
BILIRUBIN URINE: NEGATIVE
BLOOD, URINE: NEGATIVE
CLARITY: CLEAR
COLOR: ABNORMAL
GLUCOSE URINE: NEGATIVE MG/DL
KETONES, URINE: ABNORMAL MG/DL
LEUKOCYTE ESTERASE, URINE: NEGATIVE
NITRITE, URINE: NEGATIVE
PH UA: 5 (ref 5–9)
PROTEIN UA: NEGATIVE MG/DL
SPECIFIC GRAVITY UA: 1.03 (ref 1–1.03)
UROBILINOGEN, URINE: 0.2 E.U./DL

## 2019-02-15 LAB — URINE CULTURE, ROUTINE: NORMAL

## 2019-02-25 ENCOUNTER — TELEPHONE (OUTPATIENT)
Dept: FAMILY MEDICINE CLINIC | Age: 69
End: 2019-02-25

## 2019-04-10 ENCOUNTER — TELEPHONE (OUTPATIENT)
Dept: FAMILY MEDICINE CLINIC | Age: 69
End: 2019-04-10

## 2019-04-10 NOTE — TELEPHONE ENCOUNTER
Patient calling requesting appointment with Etelvina Walter. I did ask him if he was aware Etelvina Walter has left [facility] and has gone to [place]. Patient states that yes he does want to follow  to [place], phone number ([phone]) given to patient.

## 2023-03-03 PROBLEM — R13.10 DYSPHAGIA: Status: ACTIVE | Noted: 2023-03-03

## 2023-03-03 PROBLEM — E78.5 DYSLIPIDEMIA: Status: ACTIVE | Noted: 2023-03-03

## 2023-03-03 PROBLEM — E66.813 CLASS 3 SEVERE OBESITY WITH BODY MASS INDEX (BMI) OF 40.0 TO 44.9 IN ADULT: Status: ACTIVE | Noted: 2023-03-03

## 2023-03-03 PROBLEM — H04.129 DRY EYE SYNDROME: Status: ACTIVE | Noted: 2023-03-03

## 2023-03-03 PROBLEM — H17.9 CORNEAL SCARS, BOTH EYES: Status: ACTIVE | Noted: 2023-03-03

## 2023-03-03 PROBLEM — N52.01 ERECTILE DYSFUNCTION DUE TO ARTERIAL INSUFFICIENCY: Status: ACTIVE | Noted: 2023-03-03

## 2023-03-03 PROBLEM — I10 BENIGN ESSENTIAL HYPERTENSION: Status: ACTIVE | Noted: 2023-03-03

## 2023-03-03 PROBLEM — Z96.1 PSEUDOPHAKIA OF RIGHT EYE: Status: ACTIVE | Noted: 2023-03-03

## 2023-03-03 PROBLEM — B34.9 VIRAL ILLNESS: Status: RESOLVED | Noted: 2023-03-03 | Resolved: 2023-03-03

## 2023-03-03 PROBLEM — I35.0 AORTIC STENOSIS: Status: ACTIVE | Noted: 2023-03-03

## 2023-03-03 PROBLEM — E66.01 CLASS 3 SEVERE OBESITY WITH BODY MASS INDEX (BMI) OF 40.0 TO 44.9 IN ADULT (MULTI): Status: ACTIVE | Noted: 2023-03-03

## 2023-03-03 PROBLEM — H26.9 CATARACT OF LEFT EYE: Status: ACTIVE | Noted: 2023-03-03

## 2023-03-03 PROBLEM — I70.0 ARTERIOSCLEROSIS OF ABDOMINAL AORTA (CMS-HCC): Status: ACTIVE | Noted: 2023-03-03

## 2023-03-03 PROBLEM — H02.833 DERMATOCHALASIS OF BOTH EYELIDS: Status: ACTIVE | Noted: 2023-03-03

## 2023-03-03 PROBLEM — E78.2 MIXED HYPERLIPIDEMIA: Status: ACTIVE | Noted: 2023-03-03

## 2023-03-03 PROBLEM — I27.20 PULMONARY HYPERTENSION (MULTI): Status: ACTIVE | Noted: 2023-03-03

## 2023-03-03 PROBLEM — I10 HYPERTENSION: Status: ACTIVE | Noted: 2023-03-03

## 2023-03-03 PROBLEM — N28.89 RIGHT RENAL MASS: Status: ACTIVE | Noted: 2023-03-03

## 2023-03-03 PROBLEM — H01.009 BLEPHARITIS WITH ROSACEA: Status: ACTIVE | Noted: 2023-03-03

## 2023-03-03 PROBLEM — H92.11 OTORRHEA OF RIGHT EAR: Status: ACTIVE | Noted: 2023-03-03

## 2023-03-03 PROBLEM — M54.50 ACUTE RIGHT-SIDED LOW BACK PAIN WITHOUT SCIATICA: Status: RESOLVED | Noted: 2023-03-03 | Resolved: 2023-03-03

## 2023-03-03 PROBLEM — E78.00 HYPERCHOLESTEROLEMIA: Status: ACTIVE | Noted: 2023-03-03

## 2023-03-03 PROBLEM — I20.9 ANGINA, CLASS IV (CMS-HCC): Status: ACTIVE | Noted: 2023-03-03

## 2023-03-03 PROBLEM — I48.0 PAROXYSMAL ATRIAL FIBRILLATION (MULTI): Status: ACTIVE | Noted: 2023-03-03

## 2023-03-03 PROBLEM — H11.31 SUBCONJUNCTIVAL HEMORRHAGE OF RIGHT EYE: Status: ACTIVE | Noted: 2023-03-03

## 2023-03-03 PROBLEM — M35.01 KERATOCONJUNCTIVITIS SICCA (MULTI): Status: ACTIVE | Noted: 2023-03-03

## 2023-03-03 PROBLEM — M75.112 NONTRAUMATIC INCOMPLETE TEAR OF LEFT ROTATOR CUFF: Status: ACTIVE | Noted: 2023-03-03

## 2023-03-03 PROBLEM — R01.1 SYSTOLIC EJECTION MURMUR: Status: ACTIVE | Noted: 2023-03-03

## 2023-03-03 PROBLEM — H18.413 ARCUS SENILIS, BILATERAL: Status: ACTIVE | Noted: 2023-03-03

## 2023-03-03 PROBLEM — K22.5 ZENKER'S DIVERTICULUM: Status: ACTIVE | Noted: 2023-03-03

## 2023-03-03 PROBLEM — T15.11XA FOREIGN BODY IN CONJUNCTIVAL SAC, RIGHT EYE, INITIAL ENCOUNTER: Status: RESOLVED | Noted: 2023-03-03 | Resolved: 2023-03-03

## 2023-03-03 PROBLEM — G47.8 SLEEP DYSFUNCTION WITH AROUSAL DISTURBANCE: Status: ACTIVE | Noted: 2023-03-03

## 2023-03-03 PROBLEM — J34.3 HYPERTROPHY OF INFERIOR NASAL TURBINATE: Status: ACTIVE | Noted: 2023-03-03

## 2023-03-03 PROBLEM — H02.836 DERMATOCHALASIS OF BOTH EYELIDS: Status: ACTIVE | Noted: 2023-03-03

## 2023-03-03 PROBLEM — I77.89 ASCENDING AORTA ENLARGEMENT (CMS-HCC): Status: ACTIVE | Noted: 2023-03-03

## 2023-03-03 PROBLEM — Z98.61 CAD S/P PERCUTANEOUS CORONARY ANGIOPLASTY: Status: ACTIVE | Noted: 2023-03-03

## 2023-03-03 PROBLEM — U07.1 COVID-19: Status: RESOLVED | Noted: 2023-03-03 | Resolved: 2023-03-03

## 2023-03-03 PROBLEM — L71.9 BLEPHARITIS WITH ROSACEA: Status: ACTIVE | Noted: 2023-03-03

## 2023-03-03 PROBLEM — H57.11 PAIN IN PERIORBITAL REGION OF RIGHT EYE: Status: ACTIVE | Noted: 2023-03-03

## 2023-03-03 PROBLEM — R06.00 DYSPNEA: Status: ACTIVE | Noted: 2023-03-03

## 2023-03-03 PROBLEM — J44.9 CHRONIC OBSTRUCTIVE PULMONARY DISEASE (MULTI): Status: ACTIVE | Noted: 2023-03-03

## 2023-03-03 PROBLEM — H60.60 CHRONIC OTITIS EXTERNA: Status: ACTIVE | Noted: 2023-03-03

## 2023-03-03 PROBLEM — M54.6 PAIN IN THORACIC SPINE: Status: ACTIVE | Noted: 2023-03-03

## 2023-03-03 PROBLEM — D76.3: Status: ACTIVE | Noted: 2023-03-03

## 2023-03-03 PROBLEM — L98.9 SKIN LESION OF NECK: Status: ACTIVE | Noted: 2023-03-03

## 2023-03-03 PROBLEM — I25.10 CAD S/P PERCUTANEOUS CORONARY ANGIOPLASTY: Status: ACTIVE | Noted: 2023-03-03

## 2023-03-03 PROBLEM — H25.12 AGE-RELATED NUCLEAR CATARACT OF LEFT EYE: Status: ACTIVE | Noted: 2023-03-03

## 2023-03-03 PROBLEM — R53.83 FATIGUE: Status: ACTIVE | Noted: 2023-03-03

## 2023-03-03 PROBLEM — J34.2 DNS (DEVIATED NASAL SEPTUM): Status: ACTIVE | Noted: 2023-03-03

## 2023-03-03 PROBLEM — H16.229 KERATOCONJUNCTIVITIS SICCA: Status: ACTIVE | Noted: 2023-03-03

## 2023-03-03 RX ORDER — OMEPRAZOLE 20 MG/1
1 TABLET, DELAYED RELEASE ORAL DAILY
COMMUNITY

## 2023-03-03 RX ORDER — NITROGLYCERIN 0.4 MG/1
TABLET SUBLINGUAL
COMMUNITY
Start: 2015-09-16 | End: 2023-07-13 | Stop reason: SDUPTHER

## 2023-03-03 RX ORDER — NAPROXEN SODIUM 220 MG
1 TABLET ORAL 3 TIMES DAILY PRN
COMMUNITY
Start: 2022-01-10 | End: 2024-02-09 | Stop reason: WASHOUT

## 2023-03-03 RX ORDER — FLUOCINONIDE 0.5 MG/G
OINTMENT TOPICAL
COMMUNITY

## 2023-03-03 RX ORDER — LISINOPRIL 20 MG/1
1 TABLET ORAL 2 TIMES DAILY
COMMUNITY
Start: 2015-12-16

## 2023-03-03 RX ORDER — TADALAFIL 20 MG/1
TABLET ORAL
COMMUNITY
Start: 2021-01-07 | End: 2023-07-13 | Stop reason: SDUPTHER

## 2023-03-03 RX ORDER — HYDROCHLOROTHIAZIDE 25 MG/1
1 TABLET ORAL DAILY
COMMUNITY
Start: 2015-12-16

## 2023-03-03 RX ORDER — SOTALOL HYDROCHLORIDE 80 MG/1
TABLET ORAL
COMMUNITY
Start: 2015-11-09

## 2023-03-03 RX ORDER — NAPROXEN SODIUM 220 MG/1
1 TABLET, FILM COATED ORAL DAILY
COMMUNITY

## 2023-03-03 RX ORDER — IPRATROPIUM BROMIDE AND ALBUTEROL 20; 100 UG/1; UG/1
SPRAY, METERED RESPIRATORY (INHALATION) 4 TIMES DAILY
COMMUNITY
Start: 2017-08-14

## 2023-03-03 RX ORDER — BUDESONIDE AND FORMOTEROL FUMARATE DIHYDRATE 160; 4.5 UG/1; UG/1
AEROSOL RESPIRATORY (INHALATION) 2 TIMES DAILY
COMMUNITY

## 2023-03-03 RX ORDER — METOPROLOL SUCCINATE 100 MG/1
1 TABLET, EXTENDED RELEASE ORAL DAILY
COMMUNITY

## 2023-03-03 RX ORDER — SPIRONOLACTONE 25 MG/1
1 TABLET ORAL DAILY
COMMUNITY
Start: 2022-09-09

## 2023-03-03 RX ORDER — ATORVASTATIN CALCIUM 40 MG/1
80 TABLET, FILM COATED ORAL DAILY
COMMUNITY
Start: 2022-01-10

## 2023-04-12 NOTE — PROGRESS NOTES
Subjective   Patient ID: Elroy Edmonds is a 72 y.o. male who presents for Coronary Artery Disease, Hypertension, Hyperlipidemia, and Follow-up. I last saw the patient on 01/12/2023.     HPI   Patient thinks he still has UTI from the last time and he would like a urine sample. He adds that he is still urinating at least 3 times throughout the night, but he does drink a lot of fluids.     [facility] and [facility] are telling him he is very close to being anemic so he would like labs to check on this. He did eat a piece of ham and a glass of milk this morning, but he will still get bloodwork done today.     Patient declined weight today. He states that he weighs 271 pounds. He weighs himself at the gym.     He mentions that he tore the cartilage in his knees 2 months ago. He states he was carrying an entire toilet down the steps by himself.     Review of Systems  Except positives as noted in the CC & HPI      Constitutional: Denies fevers, chills, night sweats, fatigue, weight changes, change in appetite    Eyes: Denies blurry vision, double vision    ENT: Denies otalgia, trouble hearing, tinnitus, vertigo, nasal congestion, rhinorrhea, sore throat    Neck: Denies swelling, masses    Cardiovascular: Denies chest pain, palpitations, edema, orthopnea, syncope    Respiratory: Denies dyspnea, cough, wheezing, postural nocturnal dyspnea    Gastrointestinal: Denies abdominal pain, nausea, vomiting, diarrhea, constipation, melena, hematochezia    Genitourinary: Denies dysuria, hematuria, frequency, urgency    Musculoskeletal: Denies back pain, neck pain, arthralgias, myalgias    Integumentary: Denies skin lesions, rashes, masses    Neurological: Denies dizziness, headaches, confusion, limb weakness, paresthesias, syncope, convulsions    Psychiatric: Denies depression, anxiety, homicidal ideations, suicidal ideations, sleep disturbances    Endocrine: Denies polyphagia, polydipsia, polyuria, weakness,  "hair thinning, heat intolerance, cold intolerance, weight changes    Heme/Lymph: Denies easy bruising, easy bleeding, swollen glands     Objective   BP 94/52 (BP Location: Right arm, Patient Position: Sitting)   Pulse 56   Temp 36.7 °C (98.1 °F) (Temporal)   Resp 16   Ht 1.753 m (5' 9\")   Wt 123 kg (271 lb)   SpO2 96%   BMI 40.02 kg/m²     Physical Exam  94/52 on recheck of BP in the right arm.     General Appearance - well-developed, obese, 72 y.o., White male in no acute distress.     Skin - warm, pink and dry without rash or concerning lesions.     Mental Status - alert and oriented x 3. Normal mood and affect appropriate to mood.     Neck - supple without lymphadenopathy. Carotid pulses are normal without bruits. Thyroid is normal in midline without nodules.     Chest - lungs are clear to auscultation without rales, rhonchi or wheezes. Diminished breath sounds at the bases bilaterally.     Heart - regular, rate and rhythm without murmurs, rubs or gallops. Grade 2/6 systolic ejection murmur heard best at right sternal border, second intercostal space.     Abdomen - soft, morbidly obese, protuberant, nontender, nondistended. No masses, hepatomegaly or splenomegaly is noted. No rebound, rigidity or guarding is noted. Bowel sounds are normoactive.    Extremities - no cyanosis, clubbing. Trace edema above the sock line, bilaterally. Pedal pulses are 2+ normal at the dorsalis pedis and posterior pulses bilaterally.     Neurological - cranial nerves II through XII are grossly intact. Motor strength 5/5 at all fours.     Assessment/Plan   1. Benign essential hypertension  CBC and Auto Differential    Comprehensive Metabolic Panel    Follow Up In [facility] - PCP      2. Chronic obstructive pulmonary disease, unspecified COPD type (CMS/HCC)        3. CAD S/P percutaneous coronary angioplasty  Follow Up In [facility] - PCP      4. Systolic ejection murmur  Follow Up In [facility] - " PCP      5. Mixed hyperlipidemia  Lipid Panel    Follow Up In [facility] - PCP      6. Paroxysmal atrial fibrillation (CMS/HCC)        7. Angina, class IV (CMS/HCC)        8. UTI symptoms  Urine Culture    Urinalysis with Reflex Microscopic      9. Mild anemia  Iron and TIBC      10. Class 3 severe obesity due to excess calories with serious comorbidity and body mass index (BMI) of 40.0 to 44.9 in adult (CMS/HCC)        11. 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 Keratoconjunctivitis sicca (CMS/HCC)        Patient to continue current medications (with any exceptions as noted) and diet. Follow-up in 3 month(s) otherwise as needed.      Will obtain UA, C&S, CBC, iron, CMP, lipid panel, PSA today. Will call patient with results when available.     Patient is [facility].     Patient encouraged to continue with exercising and weight loss.     Scribe Attestation  By signing my name below, IKrystina Scribe   attest that this documentation has been prepared under the direction and in the presence of Bne Diaz MD.

## 2023-04-13 ENCOUNTER — OFFICE VISIT (OUTPATIENT)
Dept: PRIMARY CARE | Facility: CLINIC | Age: 73
End: 2023-04-13
Payer: MEDICARE

## 2023-04-13 ENCOUNTER — APPOINTMENT (OUTPATIENT)
Dept: LAB | Facility: LAB | Age: 73
End: 2023-04-13
Payer: MEDICARE

## 2023-04-13 VITALS
TEMPERATURE: 98.1 F | SYSTOLIC BLOOD PRESSURE: 94 MMHG | BODY MASS INDEX: 40.14 KG/M2 | RESPIRATION RATE: 16 BRPM | HEIGHT: 69 IN | HEART RATE: 56 BPM | DIASTOLIC BLOOD PRESSURE: 52 MMHG | WEIGHT: 271 LBS | OXYGEN SATURATION: 96 %

## 2023-04-13 DIAGNOSIS — R39.9 UTI SYMPTOMS: ICD-10-CM

## 2023-04-13 DIAGNOSIS — Z98.61 CAD S/P PERCUTANEOUS CORONARY ANGIOPLASTY: ICD-10-CM

## 2023-04-13 DIAGNOSIS — I25.10 CAD S/P PERCUTANEOUS CORONARY ANGIOPLASTY: ICD-10-CM

## 2023-04-13 DIAGNOSIS — R01.1 SYSTOLIC EJECTION MURMUR: ICD-10-CM

## 2023-04-13 DIAGNOSIS — I20.9 ANGINA, CLASS IV (CMS-HCC): ICD-10-CM

## 2023-04-13 DIAGNOSIS — Z12.5 SCREENING PSA (PROSTATE SPECIFIC ANTIGEN): ICD-10-CM

## 2023-04-13 DIAGNOSIS — J44.9 CHRONIC OBSTRUCTIVE PULMONARY DISEASE, UNSPECIFIED COPD TYPE (MULTI): ICD-10-CM

## 2023-04-13 DIAGNOSIS — I48.0 PAROXYSMAL ATRIAL FIBRILLATION (MULTI): ICD-10-CM

## 2023-04-13 DIAGNOSIS — E66.01 CLASS 3 SEVERE OBESITY DUE TO EXCESS CALORIES WITH SERIOUS COMORBIDITY AND BODY MASS INDEX (BMI) OF 40.0 TO 44.9 IN ADULT (MULTI): ICD-10-CM

## 2023-04-13 DIAGNOSIS — M35.01 KERATOCONJUNCTIVITIS SICCA (MULTI): ICD-10-CM

## 2023-04-13 DIAGNOSIS — I10 BENIGN ESSENTIAL HYPERTENSION: Primary | ICD-10-CM

## 2023-04-13 DIAGNOSIS — D64.9 MILD ANEMIA: ICD-10-CM

## 2023-04-13 DIAGNOSIS — D76.3: ICD-10-CM

## 2023-04-13 DIAGNOSIS — E78.2 MIXED HYPERLIPIDEMIA: ICD-10-CM

## 2023-04-13 LAB
ALANINE AMINOTRANSFERASE (SGPT) (U/L) IN SER/PLAS: 20 U/L (ref 10–52)
ALBUMIN (G/DL) IN SER/PLAS: 4.1 G/DL (ref 3.4–5)
ALKALINE PHOSPHATASE (U/L) IN SER/PLAS: 88 U/L (ref 33–136)
ANION GAP IN SER/PLAS: 11 MMOL/L (ref 10–20)
APPEARANCE, URINE: CLEAR
ASPARTATE AMINOTRANSFERASE (SGOT) (U/L) IN SER/PLAS: 20 U/L (ref 9–39)
BASOPHILS (10*3/UL) IN BLOOD BY AUTOMATED COUNT: 0.05 X10E9/L (ref 0–0.1)
BASOPHILS/100 LEUKOCYTES IN BLOOD BY AUTOMATED COUNT: 0.5 % (ref 0–2)
BILIRUBIN TOTAL (MG/DL) IN SER/PLAS: 1.4 MG/DL (ref 0–1.2)
BILIRUBIN, URINE: NEGATIVE
BLOOD, URINE: NEGATIVE
CALCIUM (MG/DL) IN SER/PLAS: 9.4 MG/DL (ref 8.6–10.3)
CARBON DIOXIDE, TOTAL (MMOL/L) IN SER/PLAS: 29 MMOL/L (ref 21–32)
CHLORIDE (MMOL/L) IN SER/PLAS: 104 MMOL/L (ref 98–107)
CHOLESTEROL (MG/DL) IN SER/PLAS: 125 MG/DL (ref 0–199)
CHOLESTEROL IN HDL (MG/DL) IN SER/PLAS: 35.5 MG/DL
CHOLESTEROL/HDL RATIO: 3.5
COLOR, URINE: NORMAL
CREATININE (MG/DL) IN SER/PLAS: 1.13 MG/DL (ref 0.5–1.3)
EOSINOPHILS (10*3/UL) IN BLOOD BY AUTOMATED COUNT: 0.08 X10E9/L (ref 0–0.4)
EOSINOPHILS/100 LEUKOCYTES IN BLOOD BY AUTOMATED COUNT: 0.9 % (ref 0–6)
ERYTHROCYTE DISTRIBUTION WIDTH (RATIO) BY AUTOMATED COUNT: 14.5 % (ref 11.5–14.5)
ERYTHROCYTE MEAN CORPUSCULAR HEMOGLOBIN CONCENTRATION (G/DL) BY AUTOMATED: 31.9 G/DL (ref 32–36)
ERYTHROCYTE MEAN CORPUSCULAR VOLUME (FL) BY AUTOMATED COUNT: 87 FL (ref 80–100)
ERYTHROCYTES (10*6/UL) IN BLOOD BY AUTOMATED COUNT: 5.13 X10E12/L (ref 4.5–5.9)
GFR MALE: 69 ML/MIN/1.73M2
GLUCOSE (MG/DL) IN SER/PLAS: 119 MG/DL (ref 74–99)
GLUCOSE, URINE: NEGATIVE MG/DL
HEMATOCRIT (%) IN BLOOD BY AUTOMATED COUNT: 44.8 % (ref 41–52)
HEMOGLOBIN (G/DL) IN BLOOD: 14.3 G/DL (ref 13.5–17.5)
IMMATURE GRANULOCYTES/100 LEUKOCYTES IN BLOOD BY AUTOMATED COUNT: 0.4 % (ref 0–0.9)
IRON (UG/DL) IN SER/PLAS: 82 UG/DL (ref 35–150)
IRON BINDING CAPACITY (UG/DL) IN SER/PLAS: 420 UG/DL (ref 240–445)
IRON SATURATION (%) IN SER/PLAS: 20 % (ref 25–45)
KETONES, URINE: NEGATIVE MG/DL
LDL: 67 MG/DL (ref 0–99)
LEUKOCYTE ESTERASE, URINE: NEGATIVE
LEUKOCYTES (10*3/UL) IN BLOOD BY AUTOMATED COUNT: 9.4 X10E9/L (ref 4.4–11.3)
LYMPHOCYTES (10*3/UL) IN BLOOD BY AUTOMATED COUNT: 1.73 X10E9/L (ref 0.8–3)
LYMPHOCYTES/100 LEUKOCYTES IN BLOOD BY AUTOMATED COUNT: 18.5 % (ref 13–44)
MONOCYTES (10*3/UL) IN BLOOD BY AUTOMATED COUNT: 1.12 X10E9/L (ref 0.05–0.8)
MONOCYTES/100 LEUKOCYTES IN BLOOD BY AUTOMATED COUNT: 12 % (ref 2–10)
NEUTROPHILS (10*3/UL) IN BLOOD BY AUTOMATED COUNT: 6.33 X10E9/L (ref 1.6–5.5)
NEUTROPHILS/100 LEUKOCYTES IN BLOOD BY AUTOMATED COUNT: 67.7 % (ref 40–80)
NITRITE, URINE: NEGATIVE
PH, URINE: 5.5 (ref 5–8)
PLATELETS (10*3/UL) IN BLOOD AUTOMATED COUNT: 279 X10E9/L (ref 150–450)
POTASSIUM (MMOL/L) IN SER/PLAS: 4.3 MMOL/L (ref 3.5–5.3)
PROSTATE SPECIFIC ANTIGEN,SCREEN: 0.79 NG/ML (ref 0–4)
PROTEIN TOTAL: 6.5 G/DL (ref 6.4–8.2)
PROTEIN, URINE: NEGATIVE MG/DL
SODIUM (MMOL/L) IN SER/PLAS: 140 MMOL/L (ref 136–145)
SPECIFIC GRAVITY, URINE: 1.02 (ref 1–1.03)
TRIGLYCERIDE (MG/DL) IN SER/PLAS: 113 MG/DL (ref 0–149)
UREA NITROGEN (MG/DL) IN SER/PLAS: 21 MG/DL (ref 6–23)
UROBILINOGEN, URINE: <2 MG/DL (ref 0–1.9)
VLDL: 23 MG/DL (ref 0–40)

## 2023-04-13 PROCEDURE — 3008F BODY MASS INDEX DOCD: CPT | Performed by: FAMILY MEDICINE

## 2023-04-13 PROCEDURE — 84153 ASSAY OF PSA TOTAL: CPT

## 2023-04-13 PROCEDURE — 87086 URINE CULTURE/COLONY COUNT: CPT

## 2023-04-13 PROCEDURE — 85025 COMPLETE CBC W/AUTO DIFF WBC: CPT

## 2023-04-13 PROCEDURE — 1159F MED LIST DOCD IN RCRD: CPT | Performed by: FAMILY MEDICINE

## 2023-04-13 PROCEDURE — 99214 OFFICE O/P EST MOD 30 MIN: CPT | Performed by: FAMILY MEDICINE

## 2023-04-13 PROCEDURE — 83550 IRON BINDING TEST: CPT

## 2023-04-13 PROCEDURE — 3078F DIAST BP <80 MM HG: CPT | Performed by: FAMILY MEDICINE

## 2023-04-13 PROCEDURE — 1036F TOBACCO NON-USER: CPT | Performed by: FAMILY MEDICINE

## 2023-04-13 PROCEDURE — 3074F SYST BP LT 130 MM HG: CPT | Performed by: FAMILY MEDICINE

## 2023-04-13 PROCEDURE — 81003 URINALYSIS AUTO W/O SCOPE: CPT

## 2023-04-13 PROCEDURE — 80061 LIPID PANEL: CPT

## 2023-04-13 PROCEDURE — 80053 COMPREHEN METABOLIC PANEL: CPT

## 2023-04-13 PROCEDURE — 36415 COLL VENOUS BLD VENIPUNCTURE: CPT

## 2023-04-13 PROCEDURE — 83540 ASSAY OF IRON: CPT

## 2023-04-13 ASSESSMENT — PATIENT HEALTH QUESTIONNAIRE - PHQ9
1. LITTLE INTEREST OR PLEASURE IN DOING THINGS: NOT AT ALL
2. FEELING DOWN, DEPRESSED OR HOPELESS: NOT AT ALL
SUM OF ALL RESPONSES TO PHQ9 QUESTIONS 1 & 2: 0

## 2023-04-13 ASSESSMENT — LIFESTYLE VARIABLES
HOW OFTEN DO YOU HAVE A DRINK CONTAINING ALCOHOL: NEVER
SKIP TO QUESTIONS 9-10: 1
HOW OFTEN DO YOU HAVE SIX OR MORE DRINKS ON ONE OCCASION: NEVER
AUDIT-C TOTAL SCORE: 0
HOW MANY STANDARD DRINKS CONTAINING ALCOHOL DO YOU HAVE ON A TYPICAL DAY: PATIENT DOES NOT DRINK

## 2023-04-13 ASSESSMENT — PAIN SCALES - GENERAL: PAINLEVEL: 0-NO PAIN

## 2023-04-13 ASSESSMENT — ENCOUNTER SYMPTOMS
OCCASIONAL FEELINGS OF UNSTEADINESS: 0
DEPRESSION: 0
LOSS OF SENSATION IN FEET: 0

## 2023-04-13 NOTE — PATIENT INSTRUCTIONS
Patient to continue current medications (with any exceptions as noted) and diet. Follow-up in 3 month(s) otherwise as needed.      Will obtain UA, C&S, CBC, iron, CMP, lipid panel, PSA today. Will call patient with results when available.     Patient is to [facility].

## 2023-04-14 LAB — URINE CULTURE: NORMAL

## 2023-05-03 ENCOUNTER — LAB (OUTPATIENT)
Dept: LAB | Facility: LAB | Age: 73
End: 2023-05-03
Payer: MEDICARE

## 2023-05-03 ENCOUNTER — TELEPHONE (OUTPATIENT)
Dept: PRIMARY CARE | Facility: CLINIC | Age: 73
End: 2023-05-03
Payer: MEDICARE

## 2023-05-03 DIAGNOSIS — R39.9 UTI SYMPTOMS: ICD-10-CM

## 2023-05-03 LAB
APPEARANCE, URINE: CLEAR
BILIRUBIN, URINE: NEGATIVE
BLOOD, URINE: NEGATIVE
COLOR, URINE: YELLOW
GLUCOSE, URINE: NEGATIVE MG/DL
KETONES, URINE: NEGATIVE MG/DL
LEUKOCYTE ESTERASE, URINE: NEGATIVE
NITRITE, URINE: NEGATIVE
PH, URINE: 5 (ref 5–8)
PROTEIN, URINE: NEGATIVE MG/DL
SPECIFIC GRAVITY, URINE: 1.02 (ref 1–1.03)
UROBILINOGEN, URINE: <2 MG/DL (ref 0–1.9)

## 2023-05-03 PROCEDURE — 87086 URINE CULTURE/COLONY COUNT: CPT

## 2023-05-03 PROCEDURE — 81003 URINALYSIS AUTO W/O SCOPE: CPT

## 2023-05-04 LAB — URINE CULTURE: NORMAL

## 2023-05-08 NOTE — PROGRESS NOTES
Subjective   Patient ID: Elroy Edmonds is a 72 y.o. male who presents for Follow-up and Results. I last saw the patient on 4/3/2023.     HPI   Patient refused weight today. He states that he has been eating healthier and exercising.     Patient states he is still having urinary issues. He states that his urine was cloudy and had a pungent smell. Denies pain. He admits to eating asparagus recently.     Patient also states that he is having right ear problems and has been having them for about a month. Patient states that his right ear is constantly popping. He has noticed that he does not get as much ear wax as he used to in that ear as well. He states that his tinnitus is getting worse.     Patient also reports severe burning eyes last week. He states it was so bad he had to lay on a cold compress. He states that his nose was constantly running with clear drainage. He admits to itching of his eyes, nose, and throat. He was also seeing floaters in his left eye and notes that he needs to see his eye doctor.     Review of Systems  Except positives as noted in the CC & HPI      Constitutional: Denies fevers, chills, night sweats, fatigue, weight changes, change in appetite    Eyes: Denies blurry vision, double vision    ENT: Denies trouble hearing, vertigo, nasal congestion, sore throat    Neck: Denies swelling, masses    Cardiovascular: Denies chest pain, palpitations, edema, orthopnea, syncope    Respiratory: Denies dyspnea, cough, wheezing, postural nocturnal dyspnea    Gastrointestinal: Denies abdominal pain, nausea, vomiting, diarrhea, constipation, melena, hematochezia    Genitourinary: Denies dysuria, hematuria, frequency, urgency    Musculoskeletal: Denies back pain, neck pain, myalgias    Integumentary: Denies skin lesions, rashes, masses    Neurological: Denies dizziness, headaches, confusion, limb weakness, paresthesias, syncope, convulsions    Psychiatric: Denies depression, anxiety, homicidal ideations,  "suicidal ideations, sleep disturbances    Endocrine: Denies polyphagia, polydipsia, polyuria, weakness, hair thinning, heat intolerance, cold intolerance, weight changes    Heme/Lymph: Denies easy bruising, easy bleeding, swollen glands     Objective   /68 (BP Location: Right arm, Patient Position: Sitting)   Pulse 76   Temp 36.1 °C (97 °F) (Temporal)   Resp 16   Ht 1.753 m (5' 9\")   SpO2 100%   BMI 40.02 kg/m²     Physical Exam  114/68 on recheck of BP in the right arm.     General Appearance - well-developed, obese, 72 y.o., White male in no acute distress.     Skin - warm, pink and dry without rash or concerning lesions.     Mental Status - alert and oriented x 3. Normal mood and affect appropriate to mood.     Ears - TMs shiny and move well with insufflation. Ear canals are clear bilaterally. Some cerumen is noted on the floor of the right ear canal.     Nose - nasal passage is clear on the left without bleeding or nasal discharge. Some congestion and clear drainage is noted in the right nasal passage.     Mouth - pharynx is pink without exudates. Dentition is normal appearing. Tongue and uvula move in the midline.     Neck - supple without lymphadenopathy. Carotid pulses are normal without bruits. Thyroid is normal in midline without nodules.     Chest - lungs are clear to auscultation without rales, rhonchi or wheezes. Diminished breath sounds at the bases bilaterally.     Heart - regular, rate and rhythm without murmurs, rubs or gallops. Grade 2/6 systolic ejection murmur heard best at right sternal border, second intercostal space.     Assessment/Plan   1. Seasonal allergic rhinitis due to pollen  mometasone (Nasonex) 50 mcg/actuation nasal spray      2. Benign essential hypertension        3. Chronic obstructive pulmonary disease, unspecified COPD type (CMS/HCC)        4. Abnormal urine odor        5. Class 3 severe obesity due to excess calories with serious comorbidity and body mass index " (BMI) of 40.0 to 44.9 in adult (CMS/HCC)        Patient to continue current medications (with any exceptions as noted) and diet. Follow-up on 7/13/2023, otherwise as needed.     Patient was started on:   Mometasone 50 mcg/act, INSTILL 2 SPRAYS INTO EACH NOSTRIL ONCE DAILY    Rx(s) sent to pharmacy.     Recommend patient try supplement shakes like [place] Instant breakfast, Slimfast, Childs, or Ensure for breakfast and lunch then have a regular meal for dinner.     Patient will [facility]      Advised patient that his urinary symptoms are likely due to eating asparagus.     [facility] is [facility] the [facility] as [facility] for repeat [facility] evaluation.         Scribe Attestation  By signing my name below, IKrystina Scribe   attest that this documentation has been prepared under the direction and in the presence of Ben Diaz MD.

## 2023-05-09 ENCOUNTER — OFFICE VISIT (OUTPATIENT)
Dept: PRIMARY CARE | Facility: CLINIC | Age: 73
End: 2023-05-09
Payer: MEDICARE

## 2023-05-09 VITALS
DIASTOLIC BLOOD PRESSURE: 68 MMHG | OXYGEN SATURATION: 100 % | HEART RATE: 76 BPM | RESPIRATION RATE: 16 BRPM | BODY MASS INDEX: 40.02 KG/M2 | SYSTOLIC BLOOD PRESSURE: 114 MMHG | TEMPERATURE: 97 F | HEIGHT: 69 IN

## 2023-05-09 DIAGNOSIS — R82.90 ABNORMAL URINE ODOR: ICD-10-CM

## 2023-05-09 DIAGNOSIS — J44.9 CHRONIC OBSTRUCTIVE PULMONARY DISEASE, UNSPECIFIED COPD TYPE (MULTI): ICD-10-CM

## 2023-05-09 DIAGNOSIS — J30.1 SEASONAL ALLERGIC RHINITIS DUE TO POLLEN: Primary | ICD-10-CM

## 2023-05-09 DIAGNOSIS — I10 BENIGN ESSENTIAL HYPERTENSION: ICD-10-CM

## 2023-05-09 DIAGNOSIS — E66.01 CLASS 3 SEVERE OBESITY DUE TO EXCESS CALORIES WITH SERIOUS COMORBIDITY AND BODY MASS INDEX (BMI) OF 40.0 TO 44.9 IN ADULT (MULTI): ICD-10-CM

## 2023-05-09 PROBLEM — R06.09 DYSPNEA ON EXERTION: Status: ACTIVE | Noted: 2023-03-03

## 2023-05-09 PROCEDURE — 3078F DIAST BP <80 MM HG: CPT | Performed by: FAMILY MEDICINE

## 2023-05-09 PROCEDURE — 3074F SYST BP LT 130 MM HG: CPT | Performed by: FAMILY MEDICINE

## 2023-05-09 PROCEDURE — 1159F MED LIST DOCD IN RCRD: CPT | Performed by: FAMILY MEDICINE

## 2023-05-09 PROCEDURE — 1036F TOBACCO NON-USER: CPT | Performed by: FAMILY MEDICINE

## 2023-05-09 PROCEDURE — 99213 OFFICE O/P EST LOW 20 MIN: CPT | Performed by: FAMILY MEDICINE

## 2023-05-09 PROCEDURE — 3008F BODY MASS INDEX DOCD: CPT | Performed by: FAMILY MEDICINE

## 2023-05-09 RX ORDER — MOMETASONE FUROATE 50 UG/1
2 SPRAY, METERED NASAL DAILY
Qty: 17 G | Refills: 1 | Status: SHIPPED | OUTPATIENT
Start: 2023-05-09 | End: 2023-07-13 | Stop reason: SDUPTHER

## 2023-05-09 ASSESSMENT — LIFESTYLE VARIABLES
AUDIT-C TOTAL SCORE: 0
HOW MANY STANDARD DRINKS CONTAINING ALCOHOL DO YOU HAVE ON A TYPICAL DAY: PATIENT DOES NOT DRINK
SKIP TO QUESTIONS 9-10: 1
HOW OFTEN DO YOU HAVE A DRINK CONTAINING ALCOHOL: NEVER
HOW OFTEN DO YOU HAVE SIX OR MORE DRINKS ON ONE OCCASION: NEVER

## 2023-05-09 ASSESSMENT — ENCOUNTER SYMPTOMS
DEPRESSION: 0
OCCASIONAL FEELINGS OF UNSTEADINESS: 0
LOSS OF SENSATION IN FEET: 0

## 2023-05-09 ASSESSMENT — PAIN SCALES - GENERAL: PAINLEVEL: 0-NO PAIN

## 2023-05-09 NOTE — PATIENT INSTRUCTIONS
Patient to continue current medications (with any exceptions as noted) and diet. Follow-up on 7/13/2023, otherwise as needed.     Patient was started on:   Mometasone 50 mcg/act, INSTILL 2 SPRAYS INTO EACH NOSTRIL ONCE DAILY    Rx(s) sent to pharmacy.     Recommend patient try supplement shakes like [place] Instant breakfast, Slimfast, Childs, or Ensure for breakfast and lunch then have a regular meal for dinner.     Patient will [facility] an [facility].      Advised patient that his urinary symptoms could be due to eating asparagus.

## 2023-07-13 ENCOUNTER — OFFICE VISIT (OUTPATIENT)
Dept: PRIMARY CARE | Facility: CLINIC | Age: 73
End: 2023-07-13
Payer: MEDICARE

## 2023-07-13 VITALS
WEIGHT: 271 LBS | BODY MASS INDEX: 40.14 KG/M2 | DIASTOLIC BLOOD PRESSURE: 80 MMHG | RESPIRATION RATE: 16 BRPM | SYSTOLIC BLOOD PRESSURE: 123 MMHG | HEIGHT: 69 IN | OXYGEN SATURATION: 95 % | TEMPERATURE: 97.7 F | HEART RATE: 52 BPM

## 2023-07-13 DIAGNOSIS — R01.1 SYSTOLIC EJECTION MURMUR: ICD-10-CM

## 2023-07-13 DIAGNOSIS — N52.01 ERECTILE DYSFUNCTION DUE TO ARTERIAL INSUFFICIENCY: ICD-10-CM

## 2023-07-13 DIAGNOSIS — H69.91 DYSFUNCTION OF RIGHT EUSTACHIAN TUBE: ICD-10-CM

## 2023-07-13 DIAGNOSIS — E66.01 CLASS 3 SEVERE OBESITY DUE TO EXCESS CALORIES WITH SERIOUS COMORBIDITY AND BODY MASS INDEX (BMI) OF 40.0 TO 44.9 IN ADULT (MULTI): ICD-10-CM

## 2023-07-13 DIAGNOSIS — E78.2 MIXED HYPERLIPIDEMIA: ICD-10-CM

## 2023-07-13 DIAGNOSIS — I48.0 PAROXYSMAL ATRIAL FIBRILLATION (MULTI): ICD-10-CM

## 2023-07-13 DIAGNOSIS — R41.3 MEMORY CHANGES: ICD-10-CM

## 2023-07-13 DIAGNOSIS — Z98.61 CAD S/P PERCUTANEOUS CORONARY ANGIOPLASTY: ICD-10-CM

## 2023-07-13 DIAGNOSIS — I10 BENIGN ESSENTIAL HYPERTENSION: Primary | ICD-10-CM

## 2023-07-13 DIAGNOSIS — J30.1 SEASONAL ALLERGIC RHINITIS DUE TO POLLEN: ICD-10-CM

## 2023-07-13 DIAGNOSIS — I27.20 PULMONARY HYPERTENSION (MULTI): ICD-10-CM

## 2023-07-13 DIAGNOSIS — I25.10 CAD S/P PERCUTANEOUS CORONARY ANGIOPLASTY: ICD-10-CM

## 2023-07-13 PROCEDURE — 3074F SYST BP LT 130 MM HG: CPT | Performed by: FAMILY MEDICINE

## 2023-07-13 PROCEDURE — 1159F MED LIST DOCD IN RCRD: CPT | Performed by: FAMILY MEDICINE

## 2023-07-13 PROCEDURE — 1036F TOBACCO NON-USER: CPT | Performed by: FAMILY MEDICINE

## 2023-07-13 PROCEDURE — 3079F DIAST BP 80-89 MM HG: CPT | Performed by: FAMILY MEDICINE

## 2023-07-13 PROCEDURE — 1126F AMNT PAIN NOTED NONE PRSNT: CPT | Performed by: FAMILY MEDICINE

## 2023-07-13 PROCEDURE — 3008F BODY MASS INDEX DOCD: CPT | Performed by: FAMILY MEDICINE

## 2023-07-13 PROCEDURE — 99214 OFFICE O/P EST MOD 30 MIN: CPT | Performed by: FAMILY MEDICINE

## 2023-07-13 RX ORDER — MOMETASONE FUROATE 50 UG/1
2 SPRAY, METERED NASAL DAILY
Qty: 17 G | Refills: 2 | Status: SHIPPED | OUTPATIENT
Start: 2023-07-13 | End: 2024-02-09 | Stop reason: WASHOUT

## 2023-07-13 RX ORDER — TADALAFIL 20 MG/1
TABLET ORAL
Qty: 10 TABLET | Refills: 2 | Status: SHIPPED | OUTPATIENT
Start: 2023-07-13 | End: 2024-02-09 | Stop reason: WASHOUT

## 2023-07-13 RX ORDER — NITROGLYCERIN 0.4 MG/1
TABLET SUBLINGUAL
Qty: 25 TABLET | Refills: 1 | Status: SHIPPED | OUTPATIENT
Start: 2023-07-13

## 2023-07-13 RX ORDER — VIT B12/LEVOMEFOLATE/VIT B6/B2 1-6-50-5MG
1 TABLET ORAL DAILY
Qty: 90 TABLET | Refills: 1 | Status: SHIPPED | OUTPATIENT
Start: 2023-07-13 | End: 2024-02-09 | Stop reason: WASHOUT

## 2023-07-13 ASSESSMENT — LIFESTYLE VARIABLES
AUDIT-C TOTAL SCORE: 0
HOW OFTEN DO YOU HAVE SIX OR MORE DRINKS ON ONE OCCASION: NEVER
HOW OFTEN DO YOU HAVE A DRINK CONTAINING ALCOHOL: NEVER
SKIP TO QUESTIONS 9-10: 1
HOW MANY STANDARD DRINKS CONTAINING ALCOHOL DO YOU HAVE ON A TYPICAL DAY: PATIENT DOES NOT DRINK

## 2023-07-13 ASSESSMENT — PATIENT HEALTH QUESTIONNAIRE - PHQ9
2. FEELING DOWN, DEPRESSED OR HOPELESS: NOT AT ALL
SUM OF ALL RESPONSES TO PHQ9 QUESTIONS 1 & 2: 0
1. LITTLE INTEREST OR PLEASURE IN DOING THINGS: NOT AT ALL

## 2023-07-13 ASSESSMENT — ENCOUNTER SYMPTOMS
LOSS OF SENSATION IN FEET: 0
OCCASIONAL FEELINGS OF UNSTEADINESS: 0
DEPRESSION: 0

## 2023-07-13 NOTE — PROGRESS NOTES
"Subjective   Patient ID: Elroy Edmonds is a 73 y.o. male who presents for Coronary Artery Disease, Pulmonary Hypertension, and Follow-up.  I last saw the patient on 5/9/2023.     HPI   He notes that his right eye has had problems for a while.     He has recently started having issues with his ear. He admits to pressure, but no pain. He has to \"clear\" it often. He believes that it is worse in the morning and has to clear it at least 10 times throughout the day. He notes that he has sinus pressure and drainage daily as well.     He mentions that he has bouts of SOB. He adds that some days he can walk without any issues, then other days, he can not walk far at all without breathing issues. [place] the zoo [place] and notes that he only had to stop one time when going up hill. He wonders if this has anything to do with his heart. He has always had SOB problems when walking up and down the stairs. Denies wheezing, coughing, and chest pain.     He has noticed some short term memory issues recently. He wonders if there is a medication to help with this.     Review of Systems  Except positives as noted in the CC & HPI      Constitutional: Denies fevers, chills, night sweats, fatigue, weight changes, change in appetite    Eyes: Denies blurry vision, double vision    ENT: Denies otalgia, trouble hearing, tinnitus, vertigo, nasal congestion, rhinorrhea, sore throat, pressure  Neck: Denies swelling, masses    Cardiovascular: Denies chest pain, palpitations, edema, orthopnea, syncope    Respiratory: Denies dyspnea, cough, wheezing, postural nocturnal dyspnea    Gastrointestinal: Denies abdominal pain, nausea, vomiting, diarrhea, constipation, melena, hematochezia    Genitourinary: Denies dysuria, hematuria, frequency, urgency    Musculoskeletal: Denies back pain, neck pain, arthralgias, myalgias    Integumentary: Denies skin lesions, rashes, masses    Neurological: Denies dizziness, headaches, confusion, limb weakness, " "paresthesias, syncope, convulsions    Psychiatric: Denies depression, anxiety, homicidal ideations, suicidal ideations, sleep disturbances    Endocrine: Denies polyphagia, polydipsia, polyuria, weakness, hair thinning, heat intolerance, cold intolerance, weight changes    Heme/Lymph: Denies easy bruising, easy bleeding, swollen glands    Objective   /80   Pulse 52   Temp 36.5 °C (97.7 °F)   Resp 16   Ht 1.753 m (5' 9\")   Wt 123 kg (271 lb)   SpO2 95%   BMI 40.02 kg/m²     Physical Exam  General Appearance - well-developed, obese, 73 y.o., White male in no acute distress.     Skin - warm, pink and dry without rash or concerning lesions.     Mental Status - alert and oriented x 3. Normal mood and affect appropriate to mood.     Ears - TMs shiny and move well with insufflation. Ear canals are clear bilaterally.     Neck - supple without lymphadenopathy. Carotid pulses are normal without bruits. Thyroid is normal in midline without nodules.     Chest - lungs are clear to auscultation without rales, rhonchi or wheezes.     Heart - regular, rate and rhythm without murmurs, rubs or gallops. Grade 2/6 systolic ejection murmur heard best at right sternal border, second intercostal space.     Abdomen - soft, obese, protuberant, nontender, nondistended. No masses, hepatomegaly or splenomegaly is noted. No rebound, rigidity or guarding is noted. Bowel sounds are normoactive.    Extremities - no cyanosis, clubbing. Trace edema above the sock line, bilaterally. Pedal pulses are 2+ normal at the dorsalis pedis and posterior pulses bilaterally.     Neurological - cranial nerves II through XII are grossly intact. Motor strength 5/5 at all fours.     Assessment/Plan   1. Benign essential hypertension  Follow Up In [facility] - PCP    Follow Up In [facility] - PCP - Established      2. CAD S/P percutaneous coronary angioplasty  nitroglycerin (Nitrostat) 0.4 mg SL tablet      3. Systolic ejection " murmur        4. Mixed hyperlipidemia  Follow [facility] - PCP    Follow Up In [facility] - PCP - Established      5. Paroxysmal atrial fibrillation (CMS/HCC)        6. Erectile dysfunction due to arterial insufficiency  tadalafil (Cialis) 20 mg tablet      7. Memory changes  levomefolate-B2-B6-B12 (Cerefolin) 6-5-50-1 mg tablet      8. Dysfunction of right eustachian tube  Referral to ENT      9. Pulmonary hypertension (CMS/HCC)        10. Class 3 severe obesity due to excess calories with serious comorbidity and body mass index (BMI) of 40.0 to 44.9 in adult (CMS/HCC)        Patient to continue current medications (with any exceptions as noted) and diet. Follow-up in 3 month(s) otherwise as needed.      Patient was started on:   Cerefolin 6-5-50-1 mg, TAKE 1 TAB BY MOUTH DAILY     Patient was given refill(s) on:   Tadalafil 20 mg, TAKE 1/2 - 1 TAB ONE HOUR BEFORE NEEDED. DO NOT TAKE MORE OFTEN THAN EVERY 3 DAYS.   Nitroglycerin 0.4 SL, PLACE 1 TAB UNDER TONGUE EVERY 5 MINUTES FOR CHEST PAIN, CALL 911 IF PAIN PERSISTS AFTER 3 TABS.      Rx(s) sent to pharmacy.     Advised patient to avoid taking Nitroglycerin and Tadalafil together.     Recommend patient start taking OTC multivitamin.     Refer patient to Dr. Thurston for further evaluation and treatment of right eustachian tube dysfunction.       Scribe Attestation  By signing my name below, Krystina PRESTON Scribe   attest that this documentation has been prepared under the direction and in the presence of Ben Diaz MD.

## 2023-07-13 NOTE — PATIENT INSTRUCTIONS
Patient to continue current medications (with any exceptions as noted) and diet. Follow-up in 3 month(s) otherwise as needed.      Patient was started on:   Cerefolin 6-5-50-1 mg, TAKE 1 TAB BY MOUTH DAILY     Patient was given refill(s) on:   Tadalafil 20 mg, TAKE 1/2 - 1 TAB ONE HOUR BEFORE NEEDED. DO NOT TAKE MORE OFTEN THAN EVERY 3 DAYS.   Nitroglycerin 0.4 SL, PLACE 1 TAB UNDER TONGUE EVERY 5 MINUTES FOR CHEST PAIN, CALL 911 IF PAIN PERSISTS AFTER 3 TABS.      Rx(s) sent to pharmacy.     Advised patient to avoid taking Nitroglycerin and Tadalafil together.     Recommend patient start taking OTC multivitamin.     Refer patient to Dr. Thurston for further evaluation and treatment of right eustachian tube dysfunction.

## 2023-07-13 NOTE — TELEPHONE ENCOUNTER
Received a fax from [facility] requesting refill for the patient's prescription of Mometasone Furoate. Medication has been pended to the provider for approval.     LV: 7/13/23  NV: 10/12/23

## 2023-10-12 ENCOUNTER — OFFICE VISIT (OUTPATIENT)
Dept: PRIMARY CARE | Facility: CLINIC | Age: 73
End: 2023-10-12
Payer: MEDICARE

## 2023-10-12 VITALS
HEART RATE: 58 BPM | BODY MASS INDEX: 40.02 KG/M2 | TEMPERATURE: 97.3 F | DIASTOLIC BLOOD PRESSURE: 52 MMHG | HEIGHT: 69 IN | RESPIRATION RATE: 16 BRPM | SYSTOLIC BLOOD PRESSURE: 112 MMHG | OXYGEN SATURATION: 94 %

## 2023-10-12 DIAGNOSIS — E61.1 IRON DEFICIENCY: ICD-10-CM

## 2023-10-12 DIAGNOSIS — R35.1 NOCTURIA: ICD-10-CM

## 2023-10-12 DIAGNOSIS — R39.15 URINARY URGENCY: ICD-10-CM

## 2023-10-12 DIAGNOSIS — Z98.61 CAD S/P PERCUTANEOUS CORONARY ANGIOPLASTY: ICD-10-CM

## 2023-10-12 DIAGNOSIS — E11.9 CONTROLLED TYPE 2 DIABETES MELLITUS WITHOUT COMPLICATION, WITHOUT LONG-TERM CURRENT USE OF INSULIN (MULTI): ICD-10-CM

## 2023-10-12 DIAGNOSIS — E66.01 CLASS 3 SEVERE OBESITY DUE TO EXCESS CALORIES WITH SERIOUS COMORBIDITY AND BODY MASS INDEX (BMI) OF 40.0 TO 44.9 IN ADULT (MULTI): ICD-10-CM

## 2023-10-12 DIAGNOSIS — I10 BENIGN ESSENTIAL HYPERTENSION: Primary | ICD-10-CM

## 2023-10-12 DIAGNOSIS — I25.10 CAD S/P PERCUTANEOUS CORONARY ANGIOPLASTY: ICD-10-CM

## 2023-10-12 DIAGNOSIS — I48.0 PAROXYSMAL ATRIAL FIBRILLATION (MULTI): ICD-10-CM

## 2023-10-12 DIAGNOSIS — J44.9 CHRONIC OBSTRUCTIVE PULMONARY DISEASE, UNSPECIFIED COPD TYPE (MULTI): ICD-10-CM

## 2023-10-12 DIAGNOSIS — E78.2 MIXED HYPERLIPIDEMIA: ICD-10-CM

## 2023-10-12 PROBLEM — H90.3 BILATERAL SENSORINEURAL HEARING LOSS: Status: ACTIVE | Noted: 2023-10-12

## 2023-10-12 PROCEDURE — 3078F DIAST BP <80 MM HG: CPT | Performed by: FAMILY MEDICINE

## 2023-10-12 PROCEDURE — 4010F ACE/ARB THERAPY RXD/TAKEN: CPT | Performed by: FAMILY MEDICINE

## 2023-10-12 PROCEDURE — 99214 OFFICE O/P EST MOD 30 MIN: CPT | Performed by: FAMILY MEDICINE

## 2023-10-12 PROCEDURE — 1159F MED LIST DOCD IN RCRD: CPT | Performed by: FAMILY MEDICINE

## 2023-10-12 PROCEDURE — 3074F SYST BP LT 130 MM HG: CPT | Performed by: FAMILY MEDICINE

## 2023-10-12 PROCEDURE — 1126F AMNT PAIN NOTED NONE PRSNT: CPT | Performed by: FAMILY MEDICINE

## 2023-10-12 PROCEDURE — 3008F BODY MASS INDEX DOCD: CPT | Performed by: FAMILY MEDICINE

## 2023-10-12 PROCEDURE — 1170F FXNL STATUS ASSESSED: CPT | Performed by: FAMILY MEDICINE

## 2023-10-12 PROCEDURE — 1036F TOBACCO NON-USER: CPT | Performed by: FAMILY MEDICINE

## 2023-10-12 RX ORDER — HYDROCODONE BITARTRATE AND ACETAMINOPHEN 5; 325 MG/1; MG/1
TABLET ORAL
COMMUNITY
Start: 2023-09-14 | End: 2024-02-09 | Stop reason: WASHOUT

## 2023-10-12 ASSESSMENT — PATIENT HEALTH QUESTIONNAIRE - PHQ9
1. LITTLE INTEREST OR PLEASURE IN DOING THINGS: NOT AT ALL
SUM OF ALL RESPONSES TO PHQ9 QUESTIONS 1 AND 2: 0
2. FEELING DOWN, DEPRESSED OR HOPELESS: NOT AT ALL

## 2023-10-12 ASSESSMENT — ACTIVITIES OF DAILY LIVING (ADL)
DRESSING: INDEPENDENT
GROCERY_SHOPPING: INDEPENDENT
BATHING: INDEPENDENT
TAKING_MEDICATION: INDEPENDENT
MANAGING_FINANCES: INDEPENDENT
DOING_HOUSEWORK: INDEPENDENT

## 2023-10-12 ASSESSMENT — ENCOUNTER SYMPTOMS
LOSS OF SENSATION IN FEET: 0
OCCASIONAL FEELINGS OF UNSTEADINESS: 0
DEPRESSION: 0

## 2023-10-12 NOTE — PATIENT INSTRUCTIONS
Patient to continue current medications (with any exceptions as noted) and diet. Follow-up in 3 month(s) otherwise as needed.      Recommend patient try OTC Melatonin to help with sleep.     Refer patient to Dr. Barrientos for further evaluation and treatment of nocturia and urinary urgency.     Refer patient to Dr. Fernandez for further evaluation, colonoscopy and EGD.     Patient declines flu vaccine.     [facility] Luo as scheduled. He will [facility] a [facility]etician through [facility] to learn more about a diabetic diet.    5

## 2023-10-12 NOTE — PROGRESS NOTES
Subjective   Patient ID: Elroy Edmonds is a 73 y.o. male who presents for Follow-up, Medicare Annual Wellness Visit Subsequent, Hypertension, and Hyperlipidemia.  I last saw the patient on 7/13/2023.     HPI   Patient would like a referral to a urologist due to his urinary urgency and bladder leakage as well as nocturia. Patient first mentioned these issues on 4/13/23 and the condition has been the same if not, worsened per patient. He adds that he usually gets up 3-4 times nightly to urinate.     Patient states that he has not been to sleep yet due to sleeping issues. He states that she just laid there with his eyes closed all night. He could not fall asleep.     Patient also has some [facility] work that he [facility]. Patient was told that his iron is low and was advised to get a colonoscopy.     Patient declined weight today.     Review of Systems  Except positives as noted in the CC & HPI      Constitutional: Denies fevers, chills, night sweats, fatigue, weight changes, change in appetite    Eyes: Denies blurry vision, double vision    ENT: Denies otalgia, trouble hearing, tinnitus, vertigo, nasal congestion, rhinorrhea, sore throat    Neck: Denies swelling, masses    Cardiovascular: Denies chest pain, palpitations, edema, orthopnea, syncope    Respiratory: Denies dyspnea, cough, wheezing, postural nocturnal dyspnea    Gastrointestinal: Denies abdominal pain, nausea, vomiting, diarrhea, constipation, melena, hematochezia    Genitourinary: Denies dysuria, hematuria  Musculoskeletal: Denies back pain, neck pain, arthralgias, myalgias    Integumentary: Denies skin lesions, rashes, masses    Neurological: Denies dizziness, headaches, confusion, limb weakness, paresthesias, syncope, convulsions    Psychiatric: Denies depression, anxiety, homicidal ideations, suicidal ideations  Endocrine: Denies polyphagia, polydipsia, polyuria, weakness, hair thinning, heat intolerance, cold intolerance, weight changes   "  Heme/Lymph: Denies easy bruising, easy bleeding, swollen glands    Objective   /52 (BP Location: Right arm, Patient Position: Sitting)   Pulse 58   Temp 36.3 °C (97.3 °F)   Resp 16   Ht 1.753 m (5' 9\")   SpO2 94%   BMI 40.02 kg/m²     Physical Exam  112/52 on recheck of BP in the right arm.     General Appearance - well-developed, obese, 73 y.o., White male in no acute distress.     Skin - warm, pink and dry without rash or concerning lesions.     Mental Status - alert and oriented x 3. Normal mood and affect appropriate to mood.     Neck - supple without lymphadenopathy. Carotid pulses are normal without bruits. Thyroid is normal in midline without nodules.     Chest - lungs are clear to auscultation without rales, rhonchi or wheezes.     Heart - regular, rate and rhythm without murmurs, rubs or gallops. Grade 2/6 systolic ejection murmur heard best at right sternal border, second intercostal space.     Abdomen - soft, obese, protuberant, nontender, nondistended. No masses, hepatomegaly or splenomegaly is noted. No rebound, rigidity or guarding is noted. Bowel sounds are normoactive.    Extremities - no cyanosis, clubbing. Trace edema above the sock line, bilaterally. Pedal pulses are 2+ normal at the dorsalis pedis and posterior pulses bilaterally.     Neurological - cranial nerves II through XII are grossly intact. Motor strength 5/5 at all fours.     Results  [facility].      Assessment/Plan   1. Benign essential hypertension  Follow Up In [facility] - PCP - Established      2. Controlled type 2 diabetes mellitus without complication, without long-term current use of insulin (CMS/HCC)        3. Nocturia  Referral to Urology      4. Urinary urgency  Referral to Urology      5. Iron deficiency  Colonoscopy Screening    EGD      6. Mixed hyperlipidemia  Follow Up In [facility] - PCP - Established      7. CAD S/P percutaneous coronary angioplasty        8. Paroxysmal " atrial fibrillation (CMS/[facility])        9. Chronic obstructive pulmonary disease, unspecified COPD type (CMS/[facility])        10. Class 3 severe obesity due to excess calories with serious comorbidity and body mass index (BMI) of 40.0 to 44.9 in adult (CMS/[facility])        Patient to continue current medications (with any exceptions as noted) and diet. Follow-up in 3 month(s) otherwise as needed.      Recommend patient try OTC Melatonin to help with sleep.     Refer patient to Dr. Barrientos for further evaluation and treatment of nocturia and urinary urgency.     Refer patient to Dr. Fernandez for further evaluation, colonoscopy and EGD.     Patient declines flu vaccine.     Patient is to [facility] Dr. Luo as scheduled. He will speak to a [facility]etician [facility] to learn more about a diabetic diet.       Scribe Attestation  By signing my name below, IKrystina Scribe   attest that this documentation has been prepared under the direction and in the presence of Ben Diaz MD.

## 2023-12-22 ENCOUNTER — APPOINTMENT (OUTPATIENT)
Dept: GASTROENTEROLOGY | Facility: HOSPITAL | Age: 73
End: 2023-12-22
Payer: MEDICARE

## 2024-01-11 ENCOUNTER — APPOINTMENT (OUTPATIENT)
Dept: PRIMARY CARE | Facility: CLINIC | Age: 74
End: 2024-01-11
Payer: MEDICARE

## 2024-01-16 ENCOUNTER — TELEPHONE (OUTPATIENT)
Dept: CARDIOLOGY | Facility: CLINIC | Age: 74
End: 2024-01-16
Payer: MEDICARE

## 2024-01-16 NOTE — TELEPHONE ENCOUNTER
1/17 Fax received from [facility] requesting Eliquis Clearance.  Printed records for Dr. Law Luo to review and make recommendations.

## 2024-01-17 NOTE — TELEPHONE ENCOUNTER
1/17  Fax confirmation received.   Department of Anesthesiology  Postprocedure Note    Patient: Christina Sosa  MRN: [id number]  YOB: 1942  Date of evaluation: 7/19/2022      Procedure Summary     Date: 07/19/22 Room / Location: [facility] OR 07 / [facility]    Anesthesia Start: 1613 Anesthesia Stop: 6964    Procedure: MEDIPORT REMOVAL (Chest) Diagnosis:       Sepsis, due to unspecified organism, unspecified whether acute organ dysfunction present ([facility] [address]      (Sepsis, due to unspecified organism, unspecified whether acute organ dysfunction present ([facility] [address] [A41.9])    Surgeons: Sherman Nelson MD Responsible Provider: Leonela Blake MD    Anesthesia Type: MAC ASA Status: 3          Anesthesia Type: MAC    Sugar Phase I:      Sugar Phase II: Sugar Score: 10      Anesthesia Post Evaluation    Patient location during evaluation: PACU  Patient participation: complete - patient participated  Level of consciousness: awake  Airway patency: patent  Nausea & Vomiting: no nausea and no vomiting  Complications: no  Cardiovascular status: hemodynamically stable  Respiratory status: acceptable and nasal cannula  Hydration status: euvolemic

## 2024-01-17 NOTE — TELEPHONE ENCOUNTER
1/17  Per Dr. Law Luo , patient is acceptable to hold Eliquis 3 days and Aspirin 7 days prior to surgery and resume following.  Form completed and faxed to [phone].

## 2024-01-23 ENCOUNTER — APPOINTMENT (OUTPATIENT)
Dept: PRIMARY CARE | Facility: CLINIC | Age: 74
End: 2024-01-23
Payer: MEDICARE

## 2024-02-01 ENCOUNTER — APPOINTMENT (OUTPATIENT)
Dept: CARDIOLOGY | Facility: CLINIC | Age: 74
End: 2024-02-01
Payer: MEDICARE

## 2024-02-08 ENCOUNTER — OFFICE VISIT (OUTPATIENT)
Dept: PRIMARY CARE | Facility: CLINIC | Age: 74
End: 2024-02-08
Payer: MEDICARE

## 2024-02-08 VITALS
WEIGHT: 265 LBS | HEART RATE: 61 BPM | OXYGEN SATURATION: 98 % | BODY MASS INDEX: 39.25 KG/M2 | RESPIRATION RATE: 16 BRPM | SYSTOLIC BLOOD PRESSURE: 98 MMHG | TEMPERATURE: 97.6 F | HEIGHT: 69 IN | DIASTOLIC BLOOD PRESSURE: 62 MMHG

## 2024-02-08 DIAGNOSIS — I10 BENIGN ESSENTIAL HYPERTENSION: ICD-10-CM

## 2024-02-08 DIAGNOSIS — I20.9 ANGINA, CLASS IV (CMS-HCC): ICD-10-CM

## 2024-02-08 DIAGNOSIS — I48.0 PAROXYSMAL ATRIAL FIBRILLATION (MULTI): ICD-10-CM

## 2024-02-08 DIAGNOSIS — J44.9 CHRONIC OBSTRUCTIVE PULMONARY DISEASE, UNSPECIFIED COPD TYPE (MULTI): ICD-10-CM

## 2024-02-08 DIAGNOSIS — I27.20 PULMONARY HYPERTENSION (MULTI): ICD-10-CM

## 2024-02-08 DIAGNOSIS — D76.3: ICD-10-CM

## 2024-02-08 DIAGNOSIS — I70.0 ARTERIOSCLEROSIS OF ABDOMINAL AORTA (CMS-HCC): ICD-10-CM

## 2024-02-08 DIAGNOSIS — M35.01 KERATOCONJUNCTIVITIS SICCA (MULTI): ICD-10-CM

## 2024-02-08 DIAGNOSIS — E11.9 CONTROLLED TYPE 2 DIABETES MELLITUS WITHOUT COMPLICATION, WITHOUT LONG-TERM CURRENT USE OF INSULIN (MULTI): Primary | ICD-10-CM

## 2024-02-08 DIAGNOSIS — R35.1 NOCTURIA: ICD-10-CM

## 2024-02-08 DIAGNOSIS — R39.15 URINARY URGENCY: ICD-10-CM

## 2024-02-08 DIAGNOSIS — E66.09 CLASS 2 OBESITY DUE TO EXCESS CALORIES WITHOUT SERIOUS COMORBIDITY WITH BODY MASS INDEX (BMI) OF 39.0 TO 39.9 IN ADULT: ICD-10-CM

## 2024-02-08 DIAGNOSIS — E66.01 CLASS 3 SEVERE OBESITY DUE TO EXCESS CALORIES WITH SERIOUS COMORBIDITY AND BODY MASS INDEX (BMI) OF 40.0 TO 44.9 IN ADULT (MULTI): ICD-10-CM

## 2024-02-08 DIAGNOSIS — E78.2 MIXED HYPERLIPIDEMIA: ICD-10-CM

## 2024-02-08 PROBLEM — E66.813 CLASS 3 SEVERE OBESITY DUE TO EXCESS CALORIES WITH SERIOUS COMORBIDITY AND BODY MASS INDEX (BMI) OF 40.0 TO 44.9 IN ADULT: Status: ACTIVE | Noted: 2024-02-08

## 2024-02-08 PROBLEM — E66.812 CLASS 2 OBESITY DUE TO EXCESS CALORIES WITHOUT SERIOUS COMORBIDITY WITH BODY MASS INDEX (BMI) OF 39.0 TO 39.9 IN ADULT: Status: ACTIVE | Noted: 2023-03-03

## 2024-02-08 PROCEDURE — 3074F SYST BP LT 130 MM HG: CPT | Performed by: FAMILY MEDICINE

## 2024-02-08 PROCEDURE — 1159F MED LIST DOCD IN RCRD: CPT | Performed by: FAMILY MEDICINE

## 2024-02-08 PROCEDURE — 99214 OFFICE O/P EST MOD 30 MIN: CPT | Performed by: FAMILY MEDICINE

## 2024-02-08 PROCEDURE — 4010F ACE/ARB THERAPY RXD/TAKEN: CPT | Performed by: FAMILY MEDICINE

## 2024-02-08 PROCEDURE — 1126F AMNT PAIN NOTED NONE PRSNT: CPT | Performed by: FAMILY MEDICINE

## 2024-02-08 PROCEDURE — 3008F BODY MASS INDEX DOCD: CPT | Performed by: FAMILY MEDICINE

## 2024-02-08 PROCEDURE — 1036F TOBACCO NON-USER: CPT | Performed by: FAMILY MEDICINE

## 2024-02-08 PROCEDURE — 3078F DIAST BP <80 MM HG: CPT | Performed by: FAMILY MEDICINE

## 2024-02-08 ASSESSMENT — ENCOUNTER SYMPTOMS
DEPRESSION: 0
OCCASIONAL FEELINGS OF UNSTEADINESS: 0
LOSS OF SENSATION IN FEET: 0

## 2024-02-08 ASSESSMENT — PATIENT HEALTH QUESTIONNAIRE - PHQ9
SUM OF ALL RESPONSES TO PHQ9 QUESTIONS 1 & 2: 0
2. FEELING DOWN, DEPRESSED OR HOPELESS: NOT AT ALL
1. LITTLE INTEREST OR PLEASURE IN DOING THINGS: NOT AT ALL

## 2024-02-08 NOTE — PATIENT INSTRUCTIONS
Patient will continue on a diabetic, low-cholesterol diet and weight reduction. Exercise as tolerated. He will continue medications as prescribed. Follow-up in 3-4 month(s) otherwise as needed.     Encouraged patient to drink plenty of fluids, at least 48 oz of water daily.     Refer patient to Dr. Barrientos for further evaluation and treatment of nocturia and urinary urgency.     Patient is scheduled for an echocardiogram on 2/19.     Patient is to follow up with Dr. Luo, [facility] as [facility].     He will drop of his most recent blood work results [facility] the [facility].

## 2024-02-08 NOTE — ASSESSMENT & PLAN NOTE
Class 3 severe obesity is stable.  Patient to continue with current medications and treatment plan.  Follow-up at least annually. Patient is to watch his diet more carefully and exercise as tolerated.

## 2024-02-08 NOTE — ASSESSMENT & PLAN NOTE
Keratoconjunctivitis sicca is stable.  Patient to continue with current medications and treatment plan.  Follow-up at least annually.

## 2024-02-08 NOTE — PROGRESS NOTES
Subjective   Patient ID: Len Edmonds is a 73 y.o. male who presents for Follow-up.  I last saw the patient on 10/12/2023.     HPI   Patient has no medical complaints today or refill requests for rooming MA.     He states that he is currently going to 3 different dentists for his teeth.     He had a cataract repaired, but has to go back.     He was told that he has skin cancer for a third time. He went and got a second opinion and was told that it is not skin cancer.     He mentions that [facility] is [facility], [facility] and cardiologist. 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     He states that his BP runs 125-130.     Review of Systems  Except positives as noted in the CC & HPI      Constitutional: Denies fevers, chills, night sweats, fatigue, weight changes, change in appetite    Eyes: Denies blurry vision, double vision    ENT: Denies otalgia, trouble hearing, tinnitus, vertigo, nasal congestion, rhinorrhea, sore throat    Neck: Denies swelling, masses    Cardiovascular: Denies chest pain, palpitations, edema, orthopnea, syncope    Respiratory: Denies dyspnea, cough, wheezing, postural nocturnal dyspnea    Gastrointestinal: Denies abdominal pain, nausea, vomiting, diarrhea, constipation, melena, hematochezia    Genitourinary: Denies dysuria, hematuria, frequency, urgency    Musculoskeletal: Denies back pain, neck pain, arthralgias, myalgias    Integumentary: Denies skin lesions, rashes, masses    Neurological: Denies dizziness, headaches, confusion, limb weakness, paresthesias, syncope, convulsions    Psychiatric: Denies depression, anxiety, homicidal ideations, suicidal ideations, sleep disturbances    Endocrine: Denies polyphagia, polydipsia, polyuria, weakness, hair thinning, heat intolerance, cold intolerance, weight changes    Heme/Lymph: Denies easy bruising, easy bleeding, swollen glands    Objective   BP 98/62 (BP  "Location: Right arm, Patient Position: Sitting)   Pulse 61   Temp 36.4 °C (97.6 °F)   Resp 16   Ht 1.753 m (5' 9\")   Wt 120 kg (265 lb)   SpO2 98%   BMI 39.13 kg/m²     Physical Exam  98/62 on recheck of BP in the right arm.     General Appearance - well-developed, well-nourished, 73 y.o., White male in no acute distress.     Skin - warm, pink and dry without rash or concerning lesions.     Mental Status - alert and oriented x 3. Normal mood and affect appropriate to mood.     Ears - TMs shiny and move well with insufflation. Mild cerumen impaction on the right.     Neck - supple without lymphadenopathy. Carotid pulses are normal without bruits. Thyroid is normal in midline without nodules.     Chest - lungs are clear to auscultation without rales, rhonchi or wheezes.     Heart - regular, rate and rhythm without murmurs, rubs or gallops. Grade 2/6 systolic ejection murmur heard best at right sternal border, second intercostal space.     Abdomen - soft, obese, protuberant, nontender, nondistended. No masses, hepatomegaly or splenomegaly is noted. No rebound, rigidity or guarding is noted. Bowel sounds are normoactive.     Extremities - no cyanosis, clubbing. Trace edema above the sock line, bilaterally. Pedal pulses are 2+ normal at the dorsalis pedis and posterior pulses bilaterally.     Neurological - cranial nerves II through XII are grossly intact. Motor strength 5/5 at all fours.     Assessment/Plan   1. Controlled type 2 diabetes mellitus without complication, without long-term current use of insulin (CMS/HCC)  Follow Up In [facility] - PCP - Established      2. Benign essential hypertension  Follow Up In [facility] - PCP - Established      3. Nocturia  Referral to Urology      4. Urinary urgency  Referral to Urology      5. Mixed hyperlipidemia  Follow Up In [facility] - PCP - Established      6. Paroxysmal atrial fibrillation (CMS/HCC)        7. Chronic obstructive " pulmonary disease, unspecified COPD type (CMS/HCC)        8. Pulmonary hypertension (CMS/HCC)        9. Angina, class IV (CMS/HCC)        10. Arteriosclerosis of abdominal aorta (CMS/HCC)        11. 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 Keratoconjunctivitis sicca (CMS/HCC)        14. Class 3 severe obesity due to excess calories with serious comorbidity and body mass index (BMI) of 40.0 to 44.9 in adult (CMS/HCC)        Patient will continue on a diabetic, low-cholesterol diet and weight reduction. Exercise as tolerated. He will continue medications as prescribed. Follow-up in 3-4 month(s) otherwise as needed.     Encouraged patient to drink plenty of fluids, at least 48 oz of water daily.     Refer patient to Dr. Barrientos for further evaluation and treatment of nocturia and urinary urgency.     Patient is scheduled for an echocardiogram on 2/19.     Patient is to follow up with [facility]. Luo[facility] as [facility].     He will drop of his most recent [facility].         Scribe Attestation  By signing my name below, IKrystina Scribe   attest that this documentation has been prepared under the direction and in the presence of Ben Diaz MD.

## 2024-02-08 NOTE — ASSESSMENT & PLAN NOTE
Adult xanthogranuloma is stable.  Patient to continue with current medications and treatment plan.  Follow-up at least annually.

## 2024-02-09 ENCOUNTER — OFFICE VISIT (OUTPATIENT)
Dept: CARDIOLOGY | Facility: CLINIC | Age: 74
End: 2024-02-09
Payer: MEDICARE

## 2024-02-09 VITALS
BODY MASS INDEX: 39.71 KG/M2 | HEART RATE: 63 BPM | HEIGHT: 69 IN | DIASTOLIC BLOOD PRESSURE: 64 MMHG | SYSTOLIC BLOOD PRESSURE: 118 MMHG

## 2024-02-09 DIAGNOSIS — I10 BENIGN ESSENTIAL HYPERTENSION: ICD-10-CM

## 2024-02-09 DIAGNOSIS — I35.0 AORTIC VALVE STENOSIS, ETIOLOGY OF CARDIAC VALVE DISEASE UNSPECIFIED: ICD-10-CM

## 2024-02-09 DIAGNOSIS — I48.0 PAROXYSMAL ATRIAL FIBRILLATION (MULTI): ICD-10-CM

## 2024-02-09 DIAGNOSIS — I25.10 CAD S/P PERCUTANEOUS CORONARY ANGIOPLASTY: ICD-10-CM

## 2024-02-09 DIAGNOSIS — Z98.61 CAD S/P PERCUTANEOUS CORONARY ANGIOPLASTY: ICD-10-CM

## 2024-02-09 DIAGNOSIS — Z87.891 FORMER CIGARETTE SMOKER: ICD-10-CM

## 2024-02-09 DIAGNOSIS — E66.09 CLASS 2 OBESITY DUE TO EXCESS CALORIES WITHOUT SERIOUS COMORBIDITY WITH BODY MASS INDEX (BMI) OF 39.0 TO 39.9 IN ADULT: ICD-10-CM

## 2024-02-09 DIAGNOSIS — I77.89 ASCENDING AORTA ENLARGEMENT (CMS-HCC): ICD-10-CM

## 2024-02-09 DIAGNOSIS — J44.9 CHRONIC OBSTRUCTIVE PULMONARY DISEASE, UNSPECIFIED COPD TYPE (MULTI): ICD-10-CM

## 2024-02-09 DIAGNOSIS — E78.2 MIXED HYPERLIPIDEMIA: ICD-10-CM

## 2024-02-09 PROCEDURE — 4010F ACE/ARB THERAPY RXD/TAKEN: CPT | Performed by: INTERNAL MEDICINE

## 2024-02-09 PROCEDURE — 99214 OFFICE O/P EST MOD 30 MIN: CPT | Performed by: INTERNAL MEDICINE

## 2024-02-09 PROCEDURE — 3008F BODY MASS INDEX DOCD: CPT | Performed by: INTERNAL MEDICINE

## 2024-02-09 PROCEDURE — 1036F TOBACCO NON-USER: CPT | Performed by: INTERNAL MEDICINE

## 2024-02-09 PROCEDURE — 1126F AMNT PAIN NOTED NONE PRSNT: CPT | Performed by: INTERNAL MEDICINE

## 2024-02-09 PROCEDURE — 1159F MED LIST DOCD IN RCRD: CPT | Performed by: INTERNAL MEDICINE

## 2024-02-09 PROCEDURE — 3078F DIAST BP <80 MM HG: CPT | Performed by: INTERNAL MEDICINE

## 2024-02-09 PROCEDURE — 3074F SYST BP LT 130 MM HG: CPT | Performed by: INTERNAL MEDICINE

## 2024-02-09 NOTE — PATIENT INSTRUCTIONS
Patient is an acceptable cardiac risk for upcoming eyelid surgery.  Patient may hold ASA 1 week prior and Eliquis 2 days prior to surgery and resume both after surgery.    You will be scheduled for an echocardiogram in the near future and we will call you results.  You will also have a repeat echocardiogram in 1 year prior to your next office visit    Follow up office visit in 1 year.    Continue same medications/treatment.  Patient educated on proper medication use.  Patient educated on risk factor modification.  Please bring ay lab results from other providers / physicians to your next appointment.    Please bring all medicines, vitamins and herbal supplements with you when you come to the office.    Prescriptions will not be filled unless you are compliant with your follow up appointments or have a follow up  appointment scheduled as per instruction of your physician.  Refills should be requested at the time of  Your visit.    Jillian PRESTON LPN, am scribing for and in the presence of Dr. Law Luo MD, FACC

## 2024-02-09 NOTE — PROGRESS NOTES
CARDIOLOGY OFFICE VISIT      CHIEF COMPLAINT      HISTORY OF PRESENT ILLNESS  The patient states he needs to have eyelid surgery done.  [facility].  I left his EKG.  His EKG is satisfactory for his surgery.  The computer read as inferior infarction.  There is no evidence of inferior infarction.  The EKG demonstrates normal sinus rhythm, poor R wave progression, no acute changes.  The patient denies chest discomfort or symptoms of myocardial ischemia.  He denies palpitations, presyncope and syncope.  He has his usual dyspnea and fatigue with activities unchanged from the past.  He denies any problem with his current medications.    Impression:  1. Paroxysmal atrial fibrillation controlled on sotalol and metoprolol and anticoagulated with Eliquis.  2. Coronary artery disease status post angioplasty with drug-eluting stent to the mid LAD and angioplasty of the first diagonal on April 17, 2017.   3. Normal left ventricular systolic function   4. Mild ascending aortic enlargement   5. Aortic stenosis, moderate  6. Hyperlipidemia  7. Obesity  8. COPD   9. Hypertension  10. COVID-19, November 2020  11. Former smoker    Cardiac status is stable at this time.  The patient is a satisfactory risk from a cardiac standpoint for his upcoming needed eyelid surgery assuming routine preop lab work is satisfactory.  I instructed him to hold his Eliquis for 2 days and has aspirin for 7 days prior to the procedure.                 Please excuse any errors in grammar or translation related to this dictation. Voice recognition software was utilized to prepare this document.          Past Medical History  Past Medical History:   Diagnosis Date    Arcus senilis, bilateral 07/17/2018    Corneal arcus senilis, bilateral    Arcus senilis, bilateral 07/17/2018    Corneal arcus senilis, bilateral    Combined forms of age-related cataract, bilateral 07/17/2018    Combined form of  age-related cataract, both eyes    Dry eye syndrome of unspecified lacrimal gland 07/17/2018    Dry eye syndrome    Dry eye syndrome of unspecified lacrimal gland 07/17/2018    Dry eye syndrome    Personal history of other diseases of the circulatory system 04/20/2016    History of atrial fibrillation    Personal history of other diseases of the musculoskeletal system and connective tissue     History of osteoarthritis    Personal history of other diseases of the nervous system and sense organs 04/20/2016    History of cataract    Presence of intraocular lens 07/17/2018    Pseudophakia of right eye    Presence of intraocular lens 07/17/2018    Pseudophakia of right eye    Strain of muscle(s) and tendon(s) of the rotator cuff of left shoulder, initial encounter 10/22/2019    Traumatic complete tear of left rotator cuff, initial encounter    Strain of muscle(s) and tendon(s) of the rotator cuff of left shoulder, subsequent encounter 01/14/2020    Traumatic complete tear of left rotator cuff, subsequent encounter    Unspecified cataract 04/20/2016    Cataract of right eye       Social History  Social History     Tobacco Use    Smoking status: Never     Passive exposure: Never    Smokeless tobacco: Never   Substance Use Topics    Alcohol use: Never    Drug use: Never       Family History     Family History   Problem Relation Name Age of Onset    Hypertension Mother      Atrial fibrillation Mother      Cataracts Mother      Glaucoma Mother      No Known Problems Father          Allergies:  No Known Allergies     Outpatient Medications:  Current Outpatient Medications   Medication Instructions    apixaban (Eliquis) 5 mg tablet 1 tablet, oral, 2 times daily    aspirin 81 mg chewable tablet 1 tablet, oral, Daily    atorvastatin (Lipitor) 40 mg tablet 1 tablet, oral, Daily    budesonide-formoteroL (Symbicort) 160-4.5 mcg/actuation inhaler inhalation, 2 times daily    fluocinonide (Lidex) 0.05 % ointment USE AS DIRECTED AS  NEEDED    hydroCHLOROthiazide (HYDRODiuril) 25 mg tablet 1 tablet, oral, Daily    HYDROcodone-acetaminophen (Norco) 5-325 mg tablet TAKE 1 TO 2 TABLETS BY MOUTH EVERY 4 TO 6 HOURS AS NEEDED FOR PAIN do no exceed 6 (SIX) pills DAILY    ipratropium-albuteroL (Combivent Respimat)  mcg/actuation inhaler inhalation, 4 times daily    levomefolate-B2-B6-B12 (Cerefolin) 6-5-50-1 mg tablet 1 tablet, oral, Daily    lisinopril 20 mg tablet 1 tablet, oral, 2 times daily    metoprolol succinate XL (Toprol-XL) 100 mg 24 hr tablet 1 tablet, oral, Daily    mometasone (Nasonex) 50 mcg/actuation nasal spray 2 sprays, Each Nostril, Daily    naproxen sodium (Aleve) 220 mg tablet 1 tablet, oral, 3 times daily PRN    nitroglycerin (Nitrostat) 0.4 mg SL tablet Place 1 tablet under the tongue every 5 minutes as needed for Chest pain. May repeat up to 3 times. Call 911 if pain persists.    omeprazole OTC (PriLOSEC OTC) 20 mg EC tablet 1 tablet, oral, Daily    peg 400-propylene glycol 0.4-0.3 % drops USE AS DIRECTED.    sotalol (Betapace) 80 mg tablet TAKE 1+1/2 TABLETS TWICE A DAY.    spironolactone (Aldactone) 25 mg tablet 1 tablet, oral, Daily    tadalafil (Cialis) 20 mg tablet TAKE 1/2 - 1 TABLET DAILY 1 HOUR BEFORE NEEDED. Do not take more than 1 in a 36 hour period          REVIEW OF SYSTEMS  Review of Systems   All other systems reviewed and are negative.        VITALS  There were no vitals filed for this visit.    PHYSICAL EXAM  Constitutional:       Appearance: Healthy appearance. Not in distress.   Eyes:      Conjunctiva/sclera: Conjunctivae normal.      Pupils: Pupils are equal, round, and reactive to light.   Neck:      Vascular: No JVR. JVD normal.   Pulmonary:      Effort: Pulmonary effort is normal.      Breath sounds: Normal breath sounds. No wheezing. No rhonchi. No rales.   Chest:      Chest wall: Not tender to palpatation.   Cardiovascular:      PMI at left midclavicular line. Normal rate. Regular rhythm. Normal S1.  Normal S2.       Murmurs: There is a grade 2/6 systolic murmur. at base      No gallop.  No click. No rub.   Pulses:     Intact distal pulses.   Edema:     Peripheral edema absent.   Abdominal:      Tenderness: There is no abdominal tenderness.   Musculoskeletal: Normal range of motion.         General: No tenderness.      Cervical back: Normal range of motion. Skin:     General: Skin is warm and dry.   Neurological:      General: No focal deficit present.      Mental Status: Alert and oriented to person, place and time.           ASSESSMENT AND PLAN  Diagnoses and all orders for this visit:  Paroxysmal atrial fibrillation (CMS/HCC)  CAD S/P percutaneous coronary angioplasty  Ascending aorta enlargement (CMS/HCC)  Aortic valve stenosis, etiology of cardiac valve disease unspecified  Mixed hyperlipidemia  Class 2 obesity due to excess calories without serious comorbidity with body mass index (BMI) of 39.0 to 39.9 in adult  Chronic obstructive pulmonary disease, unspecified COPD type (CMS/HCC)  Benign essential hypertension  Former cigarette smoker      [unfilled]

## 2024-02-19 ENCOUNTER — APPOINTMENT (OUTPATIENT)
Dept: CARDIOLOGY | Facility: CLINIC | Age: 74
End: 2024-02-19
Payer: MEDICARE

## 2024-02-29 ENCOUNTER — HOSPITAL ENCOUNTER (OUTPATIENT)
Dept: CARDIOLOGY | Facility: CLINIC | Age: 74
Discharge: HOME | End: 2024-02-29
Payer: MEDICARE

## 2024-02-29 DIAGNOSIS — I35.0 AORTIC VALVE STENOSIS, ETIOLOGY OF CARDIAC VALVE DISEASE UNSPECIFIED: ICD-10-CM

## 2024-02-29 LAB
AORTIC VALVE MEAN GRADIENT: 20 MMHG
AORTIC VALVE PEAK VELOCITY: 3.34 M/S
AV PEAK GRADIENT: 44.6 MMHG
AVA (PEAK VEL): 1.28 CM2
AVA (VTI): 1.47 CM2
EJECTION FRACTION APICAL 4 CHAMBER: 57.7
LEFT VENTRICLE INTERNAL DIMENSION DIASTOLE: 5.1 CM (ref 3.5–6)
LEFT VENTRICULAR OUTFLOW TRACT DIAMETER: 2.5 CM
MITRAL VALVE E/A RATIO: 0.74
MITRAL VALVE E/E' RATIO: 10.7
RIGHT VENTRICLE PEAK SYSTOLIC PRESSURE: 39.2 MMHG

## 2024-02-29 PROCEDURE — 93306 TTE W/DOPPLER COMPLETE: CPT | Performed by: INTERNAL MEDICINE

## 2024-02-29 PROCEDURE — 93306 TTE W/DOPPLER COMPLETE: CPT

## 2024-03-01 ENCOUNTER — LAB REQUISITION (OUTPATIENT)
Dept: LAB | Facility: HOSPITAL | Age: 74
End: 2024-03-01
Payer: MEDICARE

## 2024-03-01 DIAGNOSIS — D31.02 BENIGN NEOPLASM OF LEFT CONJUNCTIVA: ICD-10-CM

## 2024-03-01 DIAGNOSIS — D48.7 NEOPLASM OF UNCERTAIN BEHAVIOR OF OTHER SPECIFIED SITES: ICD-10-CM

## 2024-03-01 DIAGNOSIS — H02.831 DERMATOCHALASIS OF RIGHT UPPER EYELID: ICD-10-CM

## 2024-03-01 DIAGNOSIS — H02.834 DERMATOCHALASIS OF LEFT UPPER EYELID: ICD-10-CM

## 2024-03-01 PROCEDURE — 88305 TISSUE EXAM BY PATHOLOGIST: CPT

## 2024-03-01 PROCEDURE — 88305 TISSUE EXAM BY PATHOLOGIST: CPT | Performed by: PATHOLOGY

## 2024-03-05 LAB
LABORATORY COMMENT REPORT: NORMAL
PATH REPORT.FINAL DX SPEC: NORMAL
PATH REPORT.GROSS SPEC: NORMAL
PATH REPORT.RELEVANT HX SPEC: NORMAL
PATH REPORT.TOTAL CANCER: NORMAL

## 2024-03-06 ENCOUNTER — TELEPHONE (OUTPATIENT)
Dept: CARDIOLOGY | Facility: HOSPITAL | Age: 74
End: 2024-03-06
Payer: MEDICARE

## 2024-03-06 NOTE — TELEPHONE ENCOUNTER
----- Message from Law Luo MD sent at 3/2/2024  5:02 PM EST -----  Echo unchanged from previous, repeat in 1 yr. Prior to ov  ----- Message -----  From: Ana Laura Syngo - Cardiology Results In  Sent: 2/29/2024  10:12 AM EST  To: Law Luo MD

## 2024-03-07 NOTE — TELEPHONE ENCOUNTER
I already ordered the echo for December 2024 and patient will be scheduled.     Please call patient to notify recent echo is similar to those in past and no new findings. Keep appointments as planned in future.

## 2024-04-17 ENCOUNTER — HOSPITAL ENCOUNTER (OUTPATIENT)
Dept: RESPIRATORY THERAPY | Facility: HOSPITAL | Age: 74
Discharge: HOME | End: 2024-04-17
Payer: MEDICARE

## 2024-04-17 DIAGNOSIS — J44.9 CHRONIC OBSTRUCTIVE PULMONARY DISEASE, UNSPECIFIED COPD TYPE (MULTI): ICD-10-CM

## 2024-04-17 DIAGNOSIS — R06.00 DYSPNEA, UNSPECIFIED TYPE: ICD-10-CM

## 2024-04-17 LAB
ANION GAP BLDA CALCULATED.4IONS-SCNC: 7 MMO/L (ref 10–25)
ARTERIAL PATENCY WRIST A: POSITIVE
BASE EXCESS BLDA CALC-SCNC: 4.7 MMOL/L (ref -2–3)
BODY TEMPERATURE: ABNORMAL
CA-I BLDA-SCNC: 1.23 MMOL/L (ref 1.1–1.33)
CHLORIDE BLDA-SCNC: 104 MMOL/L (ref 98–107)
GLUCOSE BLDA-MCNC: 179 MG/DL (ref 74–99)
HCO3 BLDA-SCNC: 29.2 MMOL/L (ref 22–26)
HCT VFR BLD EST: 41 % (ref 41–52)
HGB BLDA-MCNC: 13.7 G/DL (ref 13.5–17.5)
INHALED O2 CONCENTRATION: 21 %
LACTATE BLDA-SCNC: 1.3 MMOL/L (ref 0.4–2)
MGC ASCENT PFT - FEV1 - PRE: 1.45
MGC ASCENT PFT - FEV1 - PRE: 1.45
MGC ASCENT PFT - FEV1 - PREDICTED: 2.8
MGC ASCENT PFT - FEV1 - PREDICTED: 2.8
MGC ASCENT PFT - FVC - PRE: 2.42
MGC ASCENT PFT - FVC - PRE: 2.42
MGC ASCENT PFT - FVC - PREDICTED: 3.73
MGC ASCENT PFT - FVC - PREDICTED: 3.73
OXYHGB MFR BLDA: 94 % (ref 94–98)
PCO2 BLDA: 42 MM HG (ref 38–42)
PH BLDA: 7.45 PH (ref 7.38–7.42)
PO2 BLDA: 70 MM HG (ref 85–95)
POTASSIUM BLDA-SCNC: 4.3 MMOL/L (ref 3.5–5.3)
SAO2 % BLDA: 95 % (ref 94–100)
SODIUM BLDA-SCNC: 136 MMOL/L (ref 136–145)
SPECIMEN DRAWN FROM PATIENT: ABNORMAL

## 2024-04-17 PROCEDURE — 94726 PLETHYSMOGRAPHY LUNG VOLUMES: CPT

## 2024-04-17 PROCEDURE — 36600 WITHDRAWAL OF ARTERIAL BLOOD: CPT

## 2024-04-17 PROCEDURE — 84132 ASSAY OF SERUM POTASSIUM: CPT | Performed by: INTERNAL MEDICINE

## 2024-04-24 ENCOUNTER — APPOINTMENT (OUTPATIENT)
Dept: RESPIRATORY THERAPY | Facility: HOSPITAL | Age: 74
End: 2024-04-24
Payer: MEDICARE

## 2024-04-30 ENCOUNTER — APPOINTMENT (OUTPATIENT)
Dept: RESPIRATORY THERAPY | Facility: HOSPITAL | Age: 74
End: 2024-04-30
Payer: MEDICARE

## 2024-05-01 ENCOUNTER — APPOINTMENT (OUTPATIENT)
Dept: RESPIRATORY THERAPY | Facility: HOSPITAL | Age: 74
End: 2024-05-01
Payer: MEDICARE

## 2024-05-07 ENCOUNTER — APPOINTMENT (OUTPATIENT)
Dept: PRIMARY CARE | Facility: CLINIC | Age: 74
End: 2024-05-07
Payer: MEDICARE

## 2024-07-22 ENCOUNTER — TELEPHONE (OUTPATIENT)
Dept: PRIMARY CARE | Facility: CLINIC | Age: 74
End: 2024-07-22
Payer: MEDICARE

## 2024-07-22 NOTE — LETTER
July 22, 2024     Patient: Elroy Edmonds   YOB: 1950   Date of Visit: 7/22/2024       To Whom It May Concern:    It is my medical opinion that Mr. Edmonds requires a disability parking placard for the following reasons:  He cannot walk without assistance from another person or the use of an assistance device (cane, crutch, prosthetic device, wheelchair, etc.).  He cannot walk 200 feet without stopping to rest.  Duration of need: 5 years expires on 7/22/2029    If you have any questions or concerns, please don't hesitate to call.         Sincerely,        Ben Diaz MD

## 2024-08-05 ENCOUNTER — HOSPITAL ENCOUNTER (EMERGENCY)
Dept: CARDIOLOGY | Facility: HOSPITAL | Age: 74
Discharge: HOME | End: 2024-08-05
Payer: MEDICARE

## 2024-08-05 ENCOUNTER — APPOINTMENT (OUTPATIENT)
Dept: RADIOLOGY | Facility: HOSPITAL | Age: 74
End: 2024-08-05
Payer: MEDICARE

## 2024-08-05 ENCOUNTER — HOSPITAL ENCOUNTER (EMERGENCY)
Facility: HOSPITAL | Age: 74
Discharge: HOME | End: 2024-08-05
Attending: INTERNAL MEDICINE
Payer: MEDICARE

## 2024-08-05 VITALS
OXYGEN SATURATION: 96 % | RESPIRATION RATE: 20 BRPM | DIASTOLIC BLOOD PRESSURE: 79 MMHG | TEMPERATURE: 97.7 F | BODY MASS INDEX: 37.94 KG/M2 | HEART RATE: 69 BPM | HEIGHT: 70 IN | WEIGHT: 265 LBS | SYSTOLIC BLOOD PRESSURE: 133 MMHG

## 2024-08-05 DIAGNOSIS — I48.92 ATRIAL FIBRILLATION/FLUTTER (MULTI): ICD-10-CM

## 2024-08-05 DIAGNOSIS — I48.91 ATRIAL FIBRILLATION, UNSPECIFIED TYPE (MULTI): Primary | ICD-10-CM

## 2024-08-05 DIAGNOSIS — I48.91 ATRIAL FIBRILLATION/FLUTTER (MULTI): ICD-10-CM

## 2024-08-05 DIAGNOSIS — R06.02 SHORTNESS OF BREATH: ICD-10-CM

## 2024-08-05 LAB
ALBUMIN SERPL BCP-MCNC: 3.7 G/DL (ref 3.4–5)
ALP SERPL-CCNC: 89 U/L (ref 33–136)
ALT SERPL W P-5'-P-CCNC: 29 U/L (ref 10–52)
ANION GAP SERPL CALC-SCNC: 12 MMOL/L (ref 10–20)
AST SERPL W P-5'-P-CCNC: 45 U/L (ref 9–39)
ATRIAL RATE: 82 BPM
BASOPHILS # BLD AUTO: 0.04 X10*3/UL (ref 0–0.1)
BASOPHILS NFR BLD AUTO: 0.4 %
BILIRUB SERPL-MCNC: 1 MG/DL (ref 0–1.2)
BNP SERPL-MCNC: 201 PG/ML (ref 0–99)
BUN SERPL-MCNC: 20 MG/DL (ref 6–23)
CALCIUM SERPL-MCNC: 8.8 MG/DL (ref 8.6–10.3)
CARDIAC TROPONIN I PNL SERPL HS: 12 NG/L (ref 0–20)
CARDIAC TROPONIN I PNL SERPL HS: 13 NG/L (ref 0–20)
CHLORIDE SERPL-SCNC: 107 MMOL/L (ref 98–107)
CO2 SERPL-SCNC: 25 MMOL/L (ref 21–32)
CREAT SERPL-MCNC: 1.06 MG/DL (ref 0.5–1.3)
EGFRCR SERPLBLD CKD-EPI 2021: 74 ML/MIN/1.73M*2
EOSINOPHIL # BLD AUTO: 0.08 X10*3/UL (ref 0–0.4)
EOSINOPHIL NFR BLD AUTO: 0.7 %
ERYTHROCYTE [DISTWIDTH] IN BLOOD BY AUTOMATED COUNT: 15.1 % (ref 11.5–14.5)
GLUCOSE SERPL-MCNC: 111 MG/DL (ref 74–99)
HCT VFR BLD AUTO: 43.7 % (ref 41–52)
HGB BLD-MCNC: 14.2 G/DL (ref 13.5–17.5)
IMM GRANULOCYTES # BLD AUTO: 0.05 X10*3/UL (ref 0–0.5)
IMM GRANULOCYTES NFR BLD AUTO: 0.4 % (ref 0–0.9)
LYMPHOCYTES # BLD AUTO: 2.04 X10*3/UL (ref 0.8–3)
LYMPHOCYTES NFR BLD AUTO: 17.9 %
MAGNESIUM SERPL-MCNC: 2.04 MG/DL (ref 1.6–2.4)
MCH RBC QN AUTO: 27.4 PG (ref 26–34)
MCHC RBC AUTO-ENTMCNC: 32.5 G/DL (ref 32–36)
MCV RBC AUTO: 84 FL (ref 80–100)
MONOCYTES # BLD AUTO: 1.2 X10*3/UL (ref 0.05–0.8)
MONOCYTES NFR BLD AUTO: 10.6 %
NEUTROPHILS # BLD AUTO: 7.96 X10*3/UL (ref 1.6–5.5)
NEUTROPHILS NFR BLD AUTO: 70 %
NRBC BLD-RTO: 0 /100 WBCS (ref 0–0)
P AXIS: 11 DEGREES
P OFFSET: 197 MS
P ONSET: 142 MS
PLATELET # BLD AUTO: 299 X10*3/UL (ref 150–450)
POTASSIUM SERPL-SCNC: 4.5 MMOL/L (ref 3.5–5.3)
PR INTERVAL: 174 MS
PROT SERPL-MCNC: 6.9 G/DL (ref 6.4–8.2)
Q ONSET: 229 MS
QRS COUNT: 13 BEATS
QRS DURATION: 126 MS
QT INTERVAL: 412 MS
QTC CALCULATION(BAZETT): 481 MS
QTC FREDERICIA: 457 MS
R AXIS: -45 DEGREES
RBC # BLD AUTO: 5.18 X10*6/UL (ref 4.5–5.9)
SODIUM SERPL-SCNC: 139 MMOL/L (ref 136–145)
T AXIS: 32 DEGREES
T OFFSET: 435 MS
VENTRICULAR RATE: 82 BPM
WBC # BLD AUTO: 11.4 X10*3/UL (ref 4.4–11.3)

## 2024-08-05 PROCEDURE — 84484 ASSAY OF TROPONIN QUANT: CPT | Performed by: INTERNAL MEDICINE

## 2024-08-05 PROCEDURE — 2500000004 HC RX 250 GENERAL PHARMACY W/ HCPCS (ALT 636 FOR OP/ED): Performed by: INTERNAL MEDICINE

## 2024-08-05 PROCEDURE — 83880 ASSAY OF NATRIURETIC PEPTIDE: CPT | Performed by: INTERNAL MEDICINE

## 2024-08-05 PROCEDURE — 93005 ELECTROCARDIOGRAM TRACING: CPT

## 2024-08-05 PROCEDURE — 83735 ASSAY OF MAGNESIUM: CPT | Performed by: INTERNAL MEDICINE

## 2024-08-05 PROCEDURE — 96365 THER/PROPH/DIAG IV INF INIT: CPT | Performed by: INTERNAL MEDICINE

## 2024-08-05 PROCEDURE — 36415 COLL VENOUS BLD VENIPUNCTURE: CPT | Performed by: INTERNAL MEDICINE

## 2024-08-05 PROCEDURE — 71046 X-RAY EXAM CHEST 2 VIEWS: CPT | Mod: FOREIGN READ | Performed by: RADIOLOGY

## 2024-08-05 PROCEDURE — 2500000001 HC RX 250 WO HCPCS SELF ADMINISTERED DRUGS (ALT 637 FOR MEDICARE OP): Performed by: INTERNAL MEDICINE

## 2024-08-05 PROCEDURE — 80053 COMPREHEN METABOLIC PANEL: CPT | Performed by: INTERNAL MEDICINE

## 2024-08-05 PROCEDURE — 99284 EMERGENCY DEPT VISIT MOD MDM: CPT | Performed by: INTERNAL MEDICINE

## 2024-08-05 PROCEDURE — 85025 COMPLETE CBC W/AUTO DIFF WBC: CPT | Performed by: INTERNAL MEDICINE

## 2024-08-05 PROCEDURE — 71046 X-RAY EXAM CHEST 2 VIEWS: CPT

## 2024-08-05 RX ORDER — SOTALOL HYDROCHLORIDE 80 MG/1
120 TABLET ORAL ONCE
Status: COMPLETED | OUTPATIENT
Start: 2024-08-05 | End: 2024-08-05

## 2024-08-05 RX ORDER — METOPROLOL SUCCINATE 50 MG/1
100 TABLET, EXTENDED RELEASE ORAL ONCE
Status: COMPLETED | OUTPATIENT
Start: 2024-08-05 | End: 2024-08-05

## 2024-08-05 RX ORDER — MAGNESIUM SULFATE 1 G/100ML
1 INJECTION INTRAVENOUS ONCE
Status: COMPLETED | OUTPATIENT
Start: 2024-08-05 | End: 2024-08-05

## 2024-08-05 ASSESSMENT — PAIN - FUNCTIONAL ASSESSMENT
PAIN_FUNCTIONAL_ASSESSMENT: 0-10
PAIN_FUNCTIONAL_ASSESSMENT: 0-10

## 2024-08-05 ASSESSMENT — LIFESTYLE VARIABLES
TOTAL SCORE: 0
EVER FELT BAD OR GUILTY ABOUT YOUR DRINKING: NO
EVER HAD A DRINK FIRST THING IN THE MORNING TO STEADY YOUR NERVES TO GET RID OF A HANGOVER: NO
HAVE YOU EVER FELT YOU SHOULD CUT DOWN ON YOUR DRINKING: NO
HAVE PEOPLE ANNOYED YOU BY CRITICIZING YOUR DRINKING: NO

## 2024-08-05 ASSESSMENT — COLUMBIA-SUICIDE SEVERITY RATING SCALE - C-SSRS
6. HAVE YOU EVER DONE ANYTHING, STARTED TO DO ANYTHING, OR PREPARED TO DO ANYTHING TO END YOUR LIFE?: NO
2. HAVE YOU ACTUALLY HAD ANY THOUGHTS OF KILLING YOURSELF?: NO
1. IN THE PAST MONTH, HAVE YOU WISHED YOU WERE DEAD OR WISHED YOU COULD GO TO SLEEP AND NOT WAKE UP?: NO

## 2024-08-05 ASSESSMENT — PAIN SCALES - GENERAL
PAINLEVEL_OUTOF10: 0 - NO PAIN

## 2024-08-05 NOTE — ED PROVIDER NOTES
HPI   Chief Complaint   Patient presents with    Irregular Heart Beat     [facility] RVR denies pain       Patient presented for evaluation of palpitations and shortness of breath.  Patient started feeling short of breath and palpitations last evening.  Patient states he was at rest at the time.  Patient had 1 alcoholic beverage prior to this starting.  Patient notes a history of atrial fibrillation and this feels as though he went back in atrial fibrillation.  Patient had an outpatient EKG showing that he did have atrial fibrillation with RVR.  Patient is now converted to a sinus rhythm.  Patient notes that 2 weeks ago he was evaluated by [facility] the [facility] and he stopped the sotalol he had been taking for the last 5 years.  Patient also takes 100 mg of metoprolol daily.  Patient states he has been on 120 mg of sotalol twice daily for extended period of time.  Patient takes Eliquis.              Patient History   Past Medical History:   Diagnosis Date    Arcus senilis, bilateral 07/17/2018    Corneal arcus senilis, bilateral    Arcus senilis, bilateral 07/17/2018    Corneal arcus senilis, bilateral    Atrial fibrillation (Multi)     Combined forms of age-related cataract, bilateral 07/17/2018    Combined form of age-related cataract, both eyes    COPD (chronic obstructive pulmonary disease) (Multi)     Coronary artery disease     Dry eye syndrome of unspecified lacrimal gland 07/17/2018    Dry eye syndrome    Dry eye syndrome of unspecified lacrimal gland 07/17/2018    Dry eye syndrome    Hyperlipidemia     Hypertension     Personal history of other diseases of the circulatory system 04/20/2016    History of atrial fibrillation    Personal history of other diseases of the musculoskeletal system and connective tissue     History of osteoarthritis    Personal history of other diseases of the nervous system and sense organs 04/20/2016    History of cataract    Presence of  intraocular lens 2018    Pseudophakia of right eye    Presence of intraocular lens 2018    Pseudophakia of right eye    Strain of muscle(s) and tendon(s) of the rotator cuff of left shoulder, initial encounter 10/22/2019    Traumatic complete tear of left rotator cuff, initial encounter    Strain of muscle(s) and tendon(s) of the rotator cuff of left shoulder, subsequent encounter 2020    Traumatic complete tear of left rotator cuff, subsequent encounter    Unspecified cataract 2016    Cataract of right eye     Past Surgical History:   Procedure Laterality Date    CATARACT EXTRACTION  10/14/2016    Cataract Surgery    CHOLECYSTECTOMY  2016    Cholecystectomy    CORONARY ANGIOPLASTY WITH STENT PLACEMENT  2018    Cath Placement Of Stent 1    OTHER SURGICAL HISTORY  2022    Neck surgery    OTHER SURGICAL HISTORY  2021    Colonoscopy    OTHER SURGICAL HISTORY  2021    Percutaneous transluminal coronary angioplasty     Family History   Problem Relation Name Age of Onset    Hypertension Mother      Atrial fibrillation Mother      Cataracts Mother      Glaucoma Mother      Heart attack Father      Cancer Father       Social History     Tobacco Use    Smoking status: Former     Current packs/day: 0.00     Types: Cigarettes     Quit date:      Years since quittin.6     Passive exposure: Never    Smokeless tobacco: Never   Substance Use Topics    Alcohol use: Not Currently     Comment: quit in     Drug use: Never       Physical Exam   ED Triage Vitals   Temperature Heart Rate Respirations BP   24 1626 24 1626 24 1626 24 1627   36.8 °C (98.2 °F) 84 16 165/83      Pulse Ox Temp Source Heart Rate Source Patient Position   24 1626 24 1655 24 1655 24 1655   96 % Temporal Monitor Sitting      BP Location FiO2 (%)     24 1655 --     Right arm        Physical Exam  Vitals and nursing note reviewed.    Constitutional:       Appearance: Normal appearance.   HENT:      Head: Atraumatic.      Right Ear: External ear normal.      Left Ear: External ear normal.      Nose: Nose normal.      Mouth/Throat:      Mouth: Mucous membranes are moist.      Pharynx: Oropharynx is clear.   Eyes:      Extraocular Movements: Extraocular movements intact.      Pupils: Pupils are equal, round, and reactive to light.   Cardiovascular:      Rate and Rhythm: Normal rate. Rhythm irregular.      Pulses: Normal pulses.   Pulmonary:      Effort: Pulmonary effort is normal.      Breath sounds: Normal breath sounds.   Abdominal:      Palpations: Abdomen is soft.      Tenderness: There is no abdominal tenderness.   Musculoskeletal:         General: No tenderness or signs of injury. Normal range of motion.      Cervical back: Normal range of motion and neck supple. No rigidity or tenderness.   Skin:     General: Skin is warm and dry.   Neurological:      General: No focal deficit present.      Mental Status: He is alert and oriented to person, place, and time. Mental status is at baseline.   Psychiatric:         Mood and Affect: Mood normal.         Behavior: Behavior normal.           ED Course & MDM   ED Course as of 08/08/24 0323   Mon Aug 05, 2024   1957 Discussed with Dr. Salcido recommends restarting sotalol at previous dose and following up with his EP as outpatient. [JA]   2012 Reassessed patient.  Updated patient with findings.  Patient agrees to follow-up as outpatient and restart sotalol.  Patient agrees to return to ED if having worsening symptoms or other concerns.  Patient notes he has follow-up with his cardiologist tomorrow. [JA]      ED Course User Index  [JA] Lee Ferro DO         Diagnoses as of 08/08/24 0323   Atrial fibrillation, unspecified type (Multi)   Shortness of breath                       No data recorded                        Medical Decision Making  Differential diagnosis: Atrial fibrillation, electrolyte  imbalance, MI, PE, other    Patient presented for evaluation of palpitations and shortness of breath.  Patient started feeling short of breath and palpitations last evening.  Patient states he was at rest at the time.  Patient had 1 alcoholic beverage prior to this starting.  Patient notes a history of atrial fibrillation and this feels as though he went back in atrial fibrillation.  Patient had an outpatient EKG showing that he did have atrial fibrillation with RVR.  Patient is now converted to a sinus rhythm.  Patient notes that 2 weeks ago he was [facility] and he stopped the sotalol he had been taking for the last 5 years.  Patient also takes 100 mg of metoprolol daily.  Patient states he has been on 120 mg of metoprolol twice daily for extended period of time.  Patient takes Eliquis.    Patient given 1 g IV mag.  Magnesium given to prevent return to atrial fibrillation.  Patient maintaining sinus rhythm.  No significant electrolyte imbalance or renal dysfunction.  Troponin with minimal delta.  No significant leukocytosis or anemia.  EKG appears nonischemic.  Discussed with cardiology and recommending restarting sotalol.  Sotalol given in the ED.  Patient has follow-up with cardiology today.  Patient agrees to continue sotalol.  Patient states he has prescription at home.  Patient agrees to follow-up as outpatient return to ED if having worsening symptoms or other concerns.        Procedure  ECG 12 lead    Performed by: Lee Ferro DO  Authorized by: Lee Ferro DO    ECG interpreted by ED Physician in the absence of a cardiologist: yes    Previous ECG:     Previous ECG:  Compared to current    Similarity:  Changes noted    Comparison ECG info:  Atrial fibrillation to sinus rhythm  Comments:      8/5/2024 16: 50 sinus rhythm, rate 82, left axis deviation, ST segments normal, T waves normal, right bundle branch block, abnormal EKG.  EKG interpreted by myself.    8/5/24 17:35  sinus rhythm, rate 72, left axis deviation, ST segments normal, T waves normal, incomplete right bundle branch block, abnormal EKG.  Similar to previous, interpreted by myself.       Lee Ferro,   08/08/24 0321

## 2024-08-06 ENCOUNTER — OFFICE VISIT (OUTPATIENT)
Dept: CARDIOLOGY | Facility: CLINIC | Age: 74
End: 2024-08-06
Payer: MEDICARE

## 2024-08-06 VITALS
SYSTOLIC BLOOD PRESSURE: 112 MMHG | BODY MASS INDEX: 39.64 KG/M2 | WEIGHT: 276.9 LBS | HEART RATE: 65 BPM | HEIGHT: 70 IN | DIASTOLIC BLOOD PRESSURE: 62 MMHG

## 2024-08-06 DIAGNOSIS — E78.2 MIXED HYPERLIPIDEMIA: ICD-10-CM

## 2024-08-06 DIAGNOSIS — E66.09 CLASS 2 OBESITY DUE TO EXCESS CALORIES WITHOUT SERIOUS COMORBIDITY WITH BODY MASS INDEX (BMI) OF 39.0 TO 39.9 IN ADULT: ICD-10-CM

## 2024-08-06 DIAGNOSIS — Z87.891 FORMER CIGARETTE SMOKER: ICD-10-CM

## 2024-08-06 DIAGNOSIS — I35.0 AORTIC VALVE STENOSIS, ETIOLOGY OF CARDIAC VALVE DISEASE UNSPECIFIED: ICD-10-CM

## 2024-08-06 DIAGNOSIS — Z98.61 CAD S/P PERCUTANEOUS CORONARY ANGIOPLASTY: ICD-10-CM

## 2024-08-06 DIAGNOSIS — I48.0 PAROXYSMAL ATRIAL FIBRILLATION (MULTI): ICD-10-CM

## 2024-08-06 DIAGNOSIS — I25.10 CAD S/P PERCUTANEOUS CORONARY ANGIOPLASTY: ICD-10-CM

## 2024-08-06 DIAGNOSIS — I10 BENIGN ESSENTIAL HYPERTENSION: ICD-10-CM

## 2024-08-06 DIAGNOSIS — E11.9 CONTROLLED TYPE 2 DIABETES MELLITUS WITHOUT COMPLICATION, WITHOUT LONG-TERM CURRENT USE OF INSULIN (MULTI): ICD-10-CM

## 2024-08-06 DIAGNOSIS — I27.20 PULMONARY HYPERTENSION (MULTI): ICD-10-CM

## 2024-08-06 DIAGNOSIS — J44.9 CHRONIC OBSTRUCTIVE PULMONARY DISEASE, UNSPECIFIED COPD TYPE (MULTI): ICD-10-CM

## 2024-08-06 DIAGNOSIS — I77.89 ASCENDING AORTA ENLARGEMENT (CMS-HCC): ICD-10-CM

## 2024-08-06 LAB
ATRIAL RATE: 72 BPM
P AXIS: 28 DEGREES
P OFFSET: 183 MS
P ONSET: 129 MS
PR INTERVAL: 164 MS
Q ONSET: 211 MS
QRS COUNT: 12 BEATS
QRS DURATION: 100 MS
QT INTERVAL: 406 MS
QTC CALCULATION(BAZETT): 444 MS
QTC FREDERICIA: 431 MS
R AXIS: -52 DEGREES
T AXIS: 47 DEGREES
T OFFSET: 414 MS
VENTRICULAR RATE: 72 BPM

## 2024-08-06 PROCEDURE — 93005 ELECTROCARDIOGRAM TRACING: CPT

## 2024-08-06 PROCEDURE — 3078F DIAST BP <80 MM HG: CPT | Performed by: INTERNAL MEDICINE

## 2024-08-06 PROCEDURE — 99214 OFFICE O/P EST MOD 30 MIN: CPT | Performed by: INTERNAL MEDICINE

## 2024-08-06 PROCEDURE — 1036F TOBACCO NON-USER: CPT | Performed by: INTERNAL MEDICINE

## 2024-08-06 PROCEDURE — 3074F SYST BP LT 130 MM HG: CPT | Performed by: INTERNAL MEDICINE

## 2024-08-06 PROCEDURE — 3008F BODY MASS INDEX DOCD: CPT | Performed by: INTERNAL MEDICINE

## 2024-08-06 PROCEDURE — 93000 ELECTROCARDIOGRAM COMPLETE: CPT | Performed by: INTERNAL MEDICINE

## 2024-08-06 PROCEDURE — 4010F ACE/ARB THERAPY RXD/TAKEN: CPT | Performed by: INTERNAL MEDICINE

## 2024-08-06 PROCEDURE — 1159F MED LIST DOCD IN RCRD: CPT | Performed by: INTERNAL MEDICINE

## 2024-08-06 RX ORDER — SOTALOL HYDROCHLORIDE 120 MG/1
120 TABLET ORAL 2 TIMES DAILY
Qty: 180 TABLET | Refills: 3 | Status: SHIPPED | OUTPATIENT
Start: 2024-08-06 | End: 2025-08-06

## 2024-08-06 NOTE — PROGRESS NOTES
Referred by Dr. Tucker ref. provider found provider found for   Chief Complaint   Patient presents with    Follow-up     Follow up from ED yesterday due to a-fib, pt also [facility]        History of Present Illness  Elroy Edmonds is a 74 y.o. year old male patient is here for follow-up.  Is a patient with history of atrial fibrillation in sinus rhythm after treated with sotalol.  [facility] who thought that he is on combined both sotalol and Toprol therefore she stopped sotalol.  After stopping sotalol he went into atrial fibrillation and had to be admitted to the hospital.  He was put back on sotalol and converted back to sinus rhythm.  He is currently sinus bradycardia.  Doing well from a cardiac standpoint no complaint.  I discussed with the patient length that he needs to monitor his heart rate if his heart rate started to slow down below 50 then we may cut back his Toprol.  He will have a follow-up with Dr. Luo as scheduled    Past Medical History  Past Medical History:   Diagnosis Date    Arcus senilis, bilateral 07/17/2018    Corneal arcus senilis, bilateral    Arcus senilis, bilateral 07/17/2018    Corneal arcus senilis, bilateral    Atrial fibrillation (Multi)     Combined forms of age-related cataract, bilateral 07/17/2018    Combined form of age-related cataract, both eyes    COPD (chronic obstructive pulmonary disease) (Multi)     Coronary artery disease     Dry eye syndrome of unspecified lacrimal gland 07/17/2018    Dry eye syndrome    Dry eye syndrome of unspecified lacrimal gland 07/17/2018    Dry eye syndrome    Hyperlipidemia     Hypertension     Personal history of other diseases of the circulatory system 04/20/2016    History of atrial fibrillation    Personal history of other diseases of the musculoskeletal system and connective tissue     History of osteoarthritis    Personal history of other diseases of the nervous  system and sense organs 2016    History of cataract    Presence of intraocular lens 2018    Pseudophakia of right eye    Presence of intraocular lens 2018    Pseudophakia of right eye    Strain of muscle(s) and tendon(s) of the rotator cuff of left shoulder, initial encounter 10/22/2019    Traumatic complete tear of left rotator cuff, initial encounter    Strain of muscle(s) and tendon(s) of the rotator cuff of left shoulder, subsequent encounter 2020    Traumatic complete tear of left rotator cuff, subsequent encounter    Unspecified cataract 2016    Cataract of right eye       Social History  Social History     Tobacco Use    Smoking status: Former     Current packs/day: 0.00     Types: Cigarettes     Quit date:      Years since quittin.6     Passive exposure: Never    Smokeless tobacco: Never   Substance Use Topics    Alcohol use: Not Currently     Comment: quit in     Drug use: Never       Family History     Family History   Problem Relation Name Age of Onset    Hypertension Mother      Atrial fibrillation Mother      Cataracts Mother      Glaucoma Mother      Heart attack Father      Cancer Father         Review of Systems  As per HPI, all other systems reviewed and negative.    Allergies:  No Known Allergies     Outpatient Medications:  Current Outpatient Medications   Medication Instructions    apixaban (Eliquis) 5 mg tablet 1 tablet, oral, 2 times daily    aspirin 81 mg chewable tablet 1 tablet, oral, Daily    atorvastatin (LIPITOR) 40 mg, oral, Daily    budesonide-formoteroL (Symbicort) 160-4.5 mcg/actuation inhaler inhalation, 2 times daily    empagliflozin (JARDIANCE) 12.5 mg, oral, Daily    fluocinonide (Lidex) 0.05 % ointment USE AS DIRECTED AS NEEDED    hydroCHLOROthiazide (HYDRODiuril) 25 mg tablet 1 tablet, oral, Daily    ipratropium-albuteroL (Combivent Respimat)  mcg/actuation inhaler inhalation, 4 times daily    lisinopril 20 mg tablet 1 tablet,  oral, 2 times daily    metoprolol succinate XL (Toprol-XL) 100 mg 24 hr tablet 1 tablet, oral, Daily    nitroglycerin (Nitrostat) 0.4 mg SL tablet Place 1 tablet under the tongue every 5 minutes as needed for Chest pain. May repeat up to 3 times. Call 911 if pain persists.    omeprazole OTC (PriLOSEC OTC) 20 mg EC tablet 1 tablet, oral, Daily    sotalol (Betapace) 120 mg tablet Take 1 tablet (120 mg) by mouth 2 times a day.    spironolactone (Aldactone) 25 mg tablet 1 tablet, oral, Daily         Vitals:  Vitals:    08/06/24 1439   BP: 112/62   Pulse: 65       Physical Exam:  Physical Exam  Constitutional:       Appearance: Normal appearance.   HENT:      Head: Normocephalic and atraumatic.   Eyes:      Extraocular Movements: Extraocular movements intact.      Pupils: Pupils are equal, round, and reactive to light.   Cardiovascular:      Rate and Rhythm: Normal rate and regular rhythm.      Pulses: Normal pulses.      Heart sounds: Normal heart sounds.   Pulmonary:      Effort: Pulmonary effort is normal.      Breath sounds: Normal breath sounds.   Abdominal:      General: Abdomen is flat.      Palpations: Abdomen is soft.   Musculoskeletal:      Right lower leg: No edema.      Left lower leg: No edema.   Skin:     General: Skin is warm and dry.   Neurological:      General: No focal deficit present.      Mental Status: He is alert and oriented to person, place, and time.             Assessment/Plan   Diagnoses and all orders for this visit:  CAD S/P percutaneous coronary angioplasty  Benign essential hypertension  Ascending aorta enlargement (CMS-HCC)  Aortic valve stenosis, etiology of cardiac valve disease unspecified  Paroxysmal atrial fibrillation (Multi)  Pulmonary hypertension (Multi)  Mixed hyperlipidemia  Class 2 obesity due to excess calories without serious comorbidity with body mass index (BMI) of 39.0 to 39.9 in adult  Controlled type 2 diabetes mellitus without complication, without long-term current  use of insulin (Multi)  Chronic obstructive pulmonary disease, unspecified COPD type (Multi)  Former cigarette smoker          So Manzano MD [facility]  Interventional Cardiology   of [facility]     Thank you for allowing me to participate in the care of this patient. Please do not hesitate to contact me with any further questions or concerns.

## 2024-08-25 ENCOUNTER — HOSPITAL ENCOUNTER (EMERGENCY)
Facility: HOSPITAL | Age: 74
Discharge: HOME | End: 2024-08-25
Payer: MEDICARE

## 2024-08-25 VITALS
WEIGHT: 268 LBS | TEMPERATURE: 97.2 F | HEART RATE: 51 BPM | HEIGHT: 70 IN | OXYGEN SATURATION: 98 % | BODY MASS INDEX: 38.37 KG/M2 | DIASTOLIC BLOOD PRESSURE: 80 MMHG | SYSTOLIC BLOOD PRESSURE: 167 MMHG | RESPIRATION RATE: 17 BRPM

## 2024-08-25 DIAGNOSIS — N48.1 BALANITIS: Primary | ICD-10-CM

## 2024-08-25 PROCEDURE — 99283 EMERGENCY DEPT VISIT LOW MDM: CPT

## 2024-08-25 PROCEDURE — 2500000001 HC RX 250 WO HCPCS SELF ADMINISTERED DRUGS (ALT 637 FOR MEDICARE OP): Performed by: PHYSICIAN ASSISTANT

## 2024-08-25 RX ORDER — NAPROXEN 500 MG/1
500 TABLET ORAL
Qty: 30 TABLET | Refills: 0 | Status: SHIPPED | OUTPATIENT
Start: 2024-08-25 | End: 2024-09-09

## 2024-08-25 RX ORDER — CEPHALEXIN 500 MG/1
500 CAPSULE ORAL ONCE
Status: DISCONTINUED | OUTPATIENT
Start: 2024-08-25 | End: 2024-08-25

## 2024-08-25 RX ORDER — CEPHALEXIN 500 MG/1
500 CAPSULE ORAL 4 TIMES DAILY
Qty: 28 CAPSULE | Refills: 0 | Status: SHIPPED | OUTPATIENT
Start: 2024-08-25 | End: 2024-09-01

## 2024-08-25 RX ORDER — IBUPROFEN 600 MG/1
600 TABLET ORAL ONCE
Status: DISCONTINUED | OUTPATIENT
Start: 2024-08-25 | End: 2024-08-25

## 2024-08-25 RX ORDER — CLOTRIMAZOLE 1 %
CREAM (GRAM) TOPICAL ONCE
Status: COMPLETED | OUTPATIENT
Start: 2024-08-25 | End: 2024-08-25

## 2024-08-25 RX ORDER — CLOTRIMAZOLE 1 %
1 CREAM (GRAM) TOPICAL 2 TIMES DAILY
Qty: 60 G | Refills: 0 | Status: SHIPPED | OUTPATIENT
Start: 2024-08-25 | End: 2024-09-24

## 2024-08-25 NOTE — ED PROVIDER NOTES
HPI   Chief Complaint   Patient presents with   • Groin Swelling       Patient is a pleasant 74-year-old male presents ER with complaint of yeast infection to his glans penis that started about 2 days ago.  Patient states he was recently started on Jardiance.  He complains of redness and irritation to his foreskin.  He denies any difficulty urinating.  He reports that he is able to fully retract the foreskin.  He denies any fevers, chills, testicle pain or swelling to the scrotum.  The discomfort is described as a burning sensation.  He is concerned he has a yeast infection.      History provided by:  Patient          Patient History   Past Medical History:   Diagnosis Date   • Arcus senilis, bilateral 07/17/2018    Corneal arcus senilis, bilateral   • Arcus senilis, bilateral 07/17/2018    Corneal arcus senilis, bilateral   • Atrial fibrillation (Multi)    • Combined forms of age-related cataract, bilateral 07/17/2018    Combined form of age-related cataract, both eyes   • COPD (chronic obstructive pulmonary disease) (Multi)    • Coronary artery disease    • Dry eye syndrome of unspecified lacrimal gland 07/17/2018    Dry eye syndrome   • Dry eye syndrome of unspecified lacrimal gland 07/17/2018    Dry eye syndrome   • Hyperlipidemia    • Hypertension    • Personal history of other diseases of the circulatory system 04/20/2016    History of atrial fibrillation   • Personal history of other diseases of the musculoskeletal system and connective tissue     History of osteoarthritis   • Personal history of other diseases of the nervous system and sense organs 04/20/2016    History of cataract   • Presence of intraocular lens 07/17/2018    Pseudophakia of right eye   • Presence of intraocular lens 07/17/2018    Pseudophakia of right eye   • Strain of muscle(s) and tendon(s) of the rotator cuff of left shoulder, initial encounter 10/22/2019    Traumatic complete tear of left rotator cuff, initial encounter   • Strain of  muscle(s) and tendon(s) of the rotator cuff of left shoulder, subsequent encounter 2020    Traumatic complete tear of left rotator cuff, subsequent encounter   • Unspecified cataract 2016    Cataract of right eye     Past Surgical History:   Procedure Laterality Date   • CATARACT EXTRACTION  10/14/2016    Cataract Surgery   • CHOLECYSTECTOMY  2016    Cholecystectomy   • CORONARY ANGIOPLASTY WITH STENT PLACEMENT  2018    Cath Placement Of Stent 1   • OTHER SURGICAL HISTORY  2022    Neck surgery   • OTHER SURGICAL HISTORY  2021    Colonoscopy   • OTHER SURGICAL HISTORY  2021    Percutaneous transluminal coronary angioplasty     Family History   Problem Relation Name Age of Onset   • Hypertension Mother     • Atrial fibrillation Mother     • Cataracts Mother     • Glaucoma Mother     • Heart attack Father     • Cancer Father       Social History     Tobacco Use   • Smoking status: Former     Current packs/day: 0.00     Types: Cigarettes     Quit date:      Years since quittin.6     Passive exposure: Never   • Smokeless tobacco: Never   Substance Use Topics   • Alcohol use: Not Currently     Comment: quit in    • Drug use: Never       Physical Exam   ED Triage Vitals [24 0304]   Temperature Heart Rate Respirations BP   36.2 °C (97.2 °F) 51 17 167/80      Pulse Ox Temp Source Heart Rate Source Patient Position   98 % Tympanic -- Sitting      BP Location FiO2 (%)     Right arm --       Physical Exam  Vitals and nursing note reviewed. Exam conducted with a chaperone present.   Constitutional:       General: He is awake. He is not in acute distress.     Appearance: Normal appearance. He is well-developed and well-groomed. He is obese. He is not ill-appearing, toxic-appearing or diaphoretic.   Abdominal:      General: Bowel sounds are normal.      Palpations: Abdomen is soft.      Tenderness: There is no abdominal tenderness.   Genitourinary:     Penis:  Uncircumcised. Erythema and tenderness present.       Testes: Normal. Cremasteric reflex is present.      Epididymis:      Right: Normal.      Left: Normal.          Comments: Balanitis on exam, scrotum shows no erythema or redness no signs to suggest Allyn's, the foreskin is easily retractable.  Musculoskeletal:         General: Normal range of motion.   Skin:     General: Skin is warm and dry.      Findings: Erythema present.   Neurological:      General: No focal deficit present.      Mental Status: He is alert and oriented to person, place, and time. Mental status is at baseline.   Psychiatric:         Mood and Affect: Mood normal.         Behavior: Behavior normal. Behavior is cooperative.         Thought Content: Thought content normal.         Judgment: Judgment normal.           ED Course & MDM   Diagnoses as of 08/25/24 0339   Balanitis                 No data recorded                                 Medical Decision Making  Temp 36 2 heart rate 51 respiration 17 blood pressure 167/88 pulse ox is 98% on room air  The patient's exam is consistent with balanitis.  He denies any history of herpes.  He was started on clotrimazole topical cream and given 500 mg of Keflex p.o. as well as Motrin 600 mg p.o.  He was discharged home on clotrimazole cream, Keflex and naproxen.  Recommend close follow-up with urology.  He was advised to return with any concerns or worsening of symptoms all questions answered prior to discharge        Procedure  Procedures     Parminder Olmstead PA-C  08/25/24 0344

## 2024-08-27 ENCOUNTER — OFFICE VISIT (OUTPATIENT)
Dept: PRIMARY CARE | Facility: CLINIC | Age: 74
End: 2024-08-27
Payer: MEDICARE

## 2024-08-27 VITALS
DIASTOLIC BLOOD PRESSURE: 72 MMHG | HEIGHT: 70 IN | WEIGHT: 266.2 LBS | OXYGEN SATURATION: 93 % | RESPIRATION RATE: 16 BRPM | BODY MASS INDEX: 38.11 KG/M2 | HEART RATE: 61 BPM | TEMPERATURE: 97.5 F | SYSTOLIC BLOOD PRESSURE: 118 MMHG

## 2024-08-27 DIAGNOSIS — N48.1: Primary | ICD-10-CM

## 2024-08-27 DIAGNOSIS — T88.7XXA MEDICATION SIDE EFFECT: ICD-10-CM

## 2024-08-27 PROCEDURE — 99213 OFFICE O/P EST LOW 20 MIN: CPT | Performed by: FAMILY MEDICINE

## 2024-08-27 PROCEDURE — 1123F ACP DISCUSS/DSCN MKR DOCD: CPT | Performed by: FAMILY MEDICINE

## 2024-08-27 PROCEDURE — 3074F SYST BP LT 130 MM HG: CPT | Performed by: FAMILY MEDICINE

## 2024-08-27 PROCEDURE — 3008F BODY MASS INDEX DOCD: CPT | Performed by: FAMILY MEDICINE

## 2024-08-27 PROCEDURE — 4010F ACE/ARB THERAPY RXD/TAKEN: CPT | Performed by: FAMILY MEDICINE

## 2024-08-27 PROCEDURE — 1159F MED LIST DOCD IN RCRD: CPT | Performed by: FAMILY MEDICINE

## 2024-08-27 PROCEDURE — 3078F DIAST BP <80 MM HG: CPT | Performed by: FAMILY MEDICINE

## 2024-08-27 PROCEDURE — 1036F TOBACCO NON-USER: CPT | Performed by: FAMILY MEDICINE

## 2024-08-27 ASSESSMENT — PATIENT HEALTH QUESTIONNAIRE - PHQ9
2. FEELING DOWN, DEPRESSED OR HOPELESS: NOT AT ALL
1. LITTLE INTEREST OR PLEASURE IN DOING THINGS: NOT AT ALL
SUM OF ALL RESPONSES TO PHQ9 QUESTIONS 1 AND 2: 0

## 2024-08-27 NOTE — PROGRESS NOTES
Subjective   Patient ID: Len Edmonds is a 74 y.o. male who presents for Hospital Follow-up.  I last saw the patient on 2/8/2024    HPI     Patient is here for a  ED F/U  Pt is not fasting for labs    PT went to ED 8/25 for lawanda swelling, was diagnosed as balanitis. He was started on clotrimazole topical cream and given 500 mg of Keflex p.o. as well as Motrin 600 mg p.o. He was discharged home on clotrimazole cream, Keflex and naproxen   He states he feels much better.    Patient has discontinued the Jardiance.    Past medical, surgical, and family history reviewed.  Reviewed and documented all medications   Pt eating well, exercising as tolerated and taking medications as directed      Review of Systems  Except positives as noted in the CC & HPI      Constitutional: Denies fevers, chills, night sweats, fatigue, weight changes, change in appetite    Eyes: Denies blurry vision, double vision    ENT: Denies otalgia, trouble hearing, tinnitus, vertigo, nasal congestion, rhinorrhea, sore throat    Neck: Denies swelling, masses    Cardiovascular: Denies chest pain, palpitations, edema, orthopnea, syncope    Respiratory: Denies dyspnea, cough, wheezing, postural nocturnal dyspnea    Gastrointestinal: Denies abdominal pain, nausea, vomiting, diarrhea, constipation, melena, hematochezia    Genitourinary: Denies dysuria, hematuria, frequency, urgency    Musculoskeletal: Denies back pain, neck pain, arthralgias, myalgias    Integumentary: Denies skin lesions, rashes, masses    Neurological: Denies dizziness, headaches, confusion, limb weakness, paresthesias, syncope, convulsions    Psychiatric: Denies depression, anxiety, homicidal ideations, suicidal ideations, sleep disturbances    Endocrine: Denies polyphagia, polydipsia, polyuria, weakness, hair thinning, heat intolerance, cold intolerance, weight changes    Heme/Lymph: Denies easy bruising, easy bleeding, swollen glands    Objective   /72 (BP Location: Left arm)   " Pulse 61   Temp 36.4 °C (97.5 °F)   Resp 16   Ht 1.778 m (5' 10\")   Wt 121 kg (266 lb 3.2 oz)   SpO2 93%   BMI 38.20 kg/m²     Physical Exam  118/72 on recheck of BP in the right arm.     General Appearance - well-developed, well-nourished, 74 y.o., White male in no acute distress.     Skin - warm, pink and dry without rash or concerning lesions.     Mental Status - alert and oriented x 3. Normal mood and affect appropriate to mood.     Genitalia - normal uncircumcised penis. No evidence of penile discharge. Very mild erythema and edema of the foreskin of the penis.      Assessment/Plan   1. Acute infective balanitis        2. Medication side effect          Patient will continue on a diabetic, low-cholesterol diet and weight reduction. Exercise as tolerated. He will continue medications as prescribed. Follow-up in 3-4 month(s) otherwise as needed.     Patient is to apply the clotrimazole cream for a total of 2 weeks.    Patient has to keep the foreskin clean and dry.    Patient is to follow up [facility] Luo[facility].     Scribe Attestation  By signing my name below, IAnahy Scribe   attest that this documentation has been prepared under the direction and in the presence of Ben Diaz MD.    "

## 2024-12-02 ENCOUNTER — APPOINTMENT (OUTPATIENT)
Dept: CARDIOLOGY | Facility: CLINIC | Age: 74
End: 2024-12-02
Payer: MEDICARE

## 2024-12-02 VITALS
HEIGHT: 69 IN | DIASTOLIC BLOOD PRESSURE: 58 MMHG | WEIGHT: 263.3 LBS | SYSTOLIC BLOOD PRESSURE: 98 MMHG | BODY MASS INDEX: 39 KG/M2 | HEART RATE: 60 BPM

## 2024-12-02 DIAGNOSIS — I10 BENIGN ESSENTIAL HYPERTENSION: ICD-10-CM

## 2024-12-02 DIAGNOSIS — I35.0 AORTIC VALVE STENOSIS, ETIOLOGY OF CARDIAC VALVE DISEASE UNSPECIFIED: ICD-10-CM

## 2024-12-02 DIAGNOSIS — Z98.61 CAD S/P PERCUTANEOUS CORONARY ANGIOPLASTY: ICD-10-CM

## 2024-12-02 DIAGNOSIS — Z87.891 FORMER CIGARETTE SMOKER: ICD-10-CM

## 2024-12-02 DIAGNOSIS — I77.89 ASCENDING AORTA ENLARGEMENT (CMS-HCC): ICD-10-CM

## 2024-12-02 DIAGNOSIS — E78.2 MIXED HYPERLIPIDEMIA: ICD-10-CM

## 2024-12-02 DIAGNOSIS — I25.10 CAD S/P PERCUTANEOUS CORONARY ANGIOPLASTY: ICD-10-CM

## 2024-12-02 DIAGNOSIS — E66.812 CLASS 2 OBESITY DUE TO EXCESS CALORIES WITHOUT SERIOUS COMORBIDITY WITH BODY MASS INDEX (BMI) OF 39.0 TO 39.9 IN ADULT: ICD-10-CM

## 2024-12-02 DIAGNOSIS — E66.09 CLASS 2 OBESITY DUE TO EXCESS CALORIES WITHOUT SERIOUS COMORBIDITY WITH BODY MASS INDEX (BMI) OF 39.0 TO 39.9 IN ADULT: ICD-10-CM

## 2024-12-02 DIAGNOSIS — I48.0 PAROXYSMAL ATRIAL FIBRILLATION (MULTI): ICD-10-CM

## 2024-12-02 DIAGNOSIS — J44.9 CHRONIC OBSTRUCTIVE PULMONARY DISEASE, UNSPECIFIED COPD TYPE (MULTI): ICD-10-CM

## 2024-12-02 PROCEDURE — 3008F BODY MASS INDEX DOCD: CPT | Performed by: INTERNAL MEDICINE

## 2024-12-02 PROCEDURE — 3074F SYST BP LT 130 MM HG: CPT | Performed by: INTERNAL MEDICINE

## 2024-12-02 PROCEDURE — 1123F ACP DISCUSS/DSCN MKR DOCD: CPT | Performed by: INTERNAL MEDICINE

## 2024-12-02 PROCEDURE — 3078F DIAST BP <80 MM HG: CPT | Performed by: INTERNAL MEDICINE

## 2024-12-02 PROCEDURE — 99214 OFFICE O/P EST MOD 30 MIN: CPT | Performed by: INTERNAL MEDICINE

## 2024-12-02 PROCEDURE — 4010F ACE/ARB THERAPY RXD/TAKEN: CPT | Performed by: INTERNAL MEDICINE

## 2024-12-02 PROCEDURE — 1159F MED LIST DOCD IN RCRD: CPT | Performed by: INTERNAL MEDICINE

## 2024-12-02 PROCEDURE — 1036F TOBACCO NON-USER: CPT | Performed by: INTERNAL MEDICINE

## 2024-12-02 RX ORDER — HYDROCHLOROTHIAZIDE 25 MG/1
12.5 TABLET ORAL DAILY
COMMUNITY
End: 2024-12-02 | Stop reason: ALTCHOICE

## 2024-12-02 RX ORDER — TIOTROPIUM BROMIDE INHALATION SPRAY 3.12 UG/1
2 SPRAY, METERED RESPIRATORY (INHALATION) DAILY
COMMUNITY

## 2024-12-02 ASSESSMENT — ENCOUNTER SYMPTOMS: IRREGULAR HEARTBEAT: 1

## 2024-12-02 NOTE — PATIENT INSTRUCTIONS
STOP hydrochlorothiazide  You will be scheduled for a 24 hour Holter and echocardiogram  Follow up in 6 weeks    Continue same medications/treatment.  Patient educated on proper medication use.  Patient educated on risk factor modification.  Please bring any lab results from other providers / physicians to your next appointment.    Please bring all medicines, vitamins and herbal supplements with you when you come to the office.    Prescriptions will not be filled unless you are compliant with your follow up appointments or have a follow up  appointment scheduled as per instruction of your physician.  Refills should be requested at the time of  Your visit.    Jillian PRESTON LPN, am scribing for and in the presence of Dr. Law Luo MD, FACC

## 2024-12-02 NOTE — PROGRESS NOTES
CARDIOLOGY OFFICE VISIT      CHIEF COMPLAINT      HISTORY OF PRESENT ILLNESS  The patient states he has not been feeling well recently.  He states she has had more problems being in and out of atrial fibs ever [facility].  He was started back on sotalol and is having less episodes but still much more problematic than he used to have.  I told him we will obtain a 24-hour Holter monitor to further evaluate.  I also told him I would like to see him back in 6 weeks after I am going to discontinue his hydrochlorothiazide and see if he feels any better.  He states he is more fatigued and tired with activities than usual which she blames on his blood pressure being low.  He wonders about stopping some of his medication.  I told him I would like to stop the hydrochlorothiazide.  I may then increase the dose of his lisinopril if needed.  He is not having any chest discomfort.  He has not used nitroglycerin.  I told him I will see him back in about 6 weeks and we will have his echo and Holter to go over.      Impression:  1. Paroxysmal atrial fibrillation controlled on sotalol and metoprolol and anticoagulated with Eliquis.  2. Coronary artery disease status post angioplasty with drug-eluting stent to the mid LAD and angioplasty of the first diagonal on April 17, 2017.   3. Normal left ventricular systolic function   4. Mild ascending aortic enlargement   5. Aortic stenosis, moderate  6. Hyperlipidemia  7. Obesity  8. COPD   9. Hypertension  10. COVID-19, November 2020  11. Former smoker     Please excuse any errors in grammar or translation related to this dictation. Voice recognition software was utilized to prepare this document.       Past Medical History  Past Medical History:   Diagnosis Date    Arcus senilis, bilateral 07/17/2018    Corneal arcus senilis, bilateral    Arcus senilis, bilateral 07/17/2018    Corneal arcus senilis, bilateral    Atrial fibrillation (Multi)     Combined forms of  age-related cataract, bilateral 2018    Combined form of age-related cataract, both eyes    COPD (chronic obstructive pulmonary disease) (Multi)     Coronary artery disease     Dry eye syndrome of unspecified lacrimal gland 2018    Dry eye syndrome    Dry eye syndrome of unspecified lacrimal gland 2018    Dry eye syndrome    Hyperlipidemia     Hypertension     Personal history of other diseases of the circulatory system 2016    History of atrial fibrillation    Personal history of other diseases of the musculoskeletal system and connective tissue     History of osteoarthritis    Personal history of other diseases of the nervous system and sense organs 2016    History of cataract    Presence of intraocular lens 2018    Pseudophakia of right eye    Presence of intraocular lens 2018    Pseudophakia of right eye    Strain of muscle(s) and tendon(s) of the rotator cuff of left shoulder, initial encounter 10/22/2019    Traumatic complete tear of left rotator cuff, initial encounter    Strain of muscle(s) and tendon(s) of the rotator cuff of left shoulder, subsequent encounter 2020    Traumatic complete tear of left rotator cuff, subsequent encounter    Unspecified cataract 2016    Cataract of right eye       Social History  Social History     Tobacco Use    Smoking status: Former     Current packs/day: 0.00     Types: Cigarettes     Quit date:      Years since quittin.9     Passive exposure: Never    Smokeless tobacco: Never   Substance Use Topics    Alcohol use: Not Currently     Comment: quit in     Drug use: Never       Family History     Family History   Problem Relation Name Age of Onset    Hypertension Mother      Atrial fibrillation Mother      Cataracts Mother      Glaucoma Mother      Heart attack Father      Cancer Father          Allergies:  Allergies   Allergen Reactions    Jardiance [Empagliflozin] Rash     Yeast infection of the foreskin.         Outpatient Medications:  Current Outpatient Medications   Medication Instructions    albuterol 90 mcg/actuation aerosol powdr breath activated inhaler 2 puffs, Every 6 hours PRN    apixaban (Eliquis) 5 mg tablet 1 tablet, 2 times daily    aspirin 81 mg chewable tablet 1 tablet, Daily    atorvastatin (LIPITOR) 40 mg, Daily    budesonide-formoteroL (Symbicort) 160-4.5 mcg/actuation inhaler 2 times daily    fluocinonide (Lidex) 0.05 % ointment USE AS DIRECTED AS NEEDED    hydroCHLOROthiazide (HYDRODIURIL) 12.5 mg, Daily    ipratropium-albuteroL (Combivent Respimat)  mcg/actuation inhaler 4 times daily    lisinopril 20 mg tablet 1 tablet, 2 times daily    metoprolol succinate XL (Toprol-XL) 100 mg 24 hr tablet 1 tablet, Daily    nitroglycerin (Nitrostat) 0.4 mg SL tablet Place 1 tablet under the tongue every 5 minutes as needed for Chest pain. May repeat up to 3 times. Call 911 if pain persists.    omeprazole OTC (PriLOSEC OTC) 20 mg EC tablet 1 tablet, Daily    sotalol (BETAPACE) 120 mg, oral, 2 times daily    spironolactone (Aldactone) 25 mg tablet 1 tablet, Daily    tiotropium (Spiriva Respimat) 2.5 mcg/actuation inhaler 2 puffs, Daily          REVIEW OF SYSTEMS  Review of Systems   Cardiovascular:  Positive for irregular heartbeat.         VITALS  Vitals:    12/02/24 1422   BP: 98/58   Pulse: 60       PHYSICAL EXAM  Constitutional:       Appearance: Healthy appearance. Not in distress.   Eyes:      Conjunctiva/sclera: Conjunctivae normal.      Pupils: Pupils are equal, round, and reactive to light.   Neck:      Vascular: No JVR. JVD normal.   Pulmonary:      Effort: Pulmonary effort is normal.      Breath sounds: Normal breath sounds. No wheezing. No rhonchi. No rales.   Chest:      Chest wall: Not tender to palpatation.   Cardiovascular:      PMI at left midclavicular line. Normal rate. Regular rhythm. Normal S1. Normal S2.       Murmurs: There is a grade 2/6 systolic murmur. At base      No gallop.   No click. No rub.   Pulses:     Intact distal pulses.   Edema:     Peripheral edema absent.   Abdominal:      Tenderness: There is no abdominal tenderness.   Musculoskeletal: Normal range of motion.         General: No tenderness.      Cervical back: Normal range of motion. Skin:     General: Skin is warm and dry.   Neurological:      General: No focal deficit present.      Mental Status: Alert and oriented to person, place and time.           ASSESSMENT AND PLAN  Diagnoses and all orders for this visit:  Paroxysmal atrial fibrillation (Multi)  CAD S/P percutaneous coronary angioplasty  Ascending aorta enlargement (CMS-HCC)  Aortic valve stenosis, etiology of cardiac valve disease unspecified  Mixed hyperlipidemia  Benign essential hypertension  Chronic obstructive pulmonary disease, unspecified COPD type (Multi)  Class 2 obesity due to excess calories without serious comorbidity with body mass index (BMI) of 39.0 to 39.9 in adult  Former cigarette smoker      [unfilled]

## 2024-12-25 ENCOUNTER — APPOINTMENT (OUTPATIENT)
Dept: URGENT CARE | Age: 74
End: 2024-12-25
Payer: MEDICARE

## 2024-12-30 ENCOUNTER — APPOINTMENT (OUTPATIENT)
Dept: CARDIOLOGY | Facility: CLINIC | Age: 74
End: 2024-12-30
Payer: MEDICARE

## 2024-12-30 ENCOUNTER — HOSPITAL ENCOUNTER (OUTPATIENT)
Dept: CARDIOLOGY | Facility: CLINIC | Age: 74
Discharge: HOME | End: 2024-12-30
Payer: MEDICARE

## 2024-12-30 DIAGNOSIS — I77.89 ASCENDING AORTA ENLARGEMENT (CMS-HCC): ICD-10-CM

## 2024-12-30 DIAGNOSIS — I48.0 PAROXYSMAL ATRIAL FIBRILLATION (MULTI): ICD-10-CM

## 2024-12-30 DIAGNOSIS — I35.0 AORTIC VALVE STENOSIS, ETIOLOGY OF CARDIAC VALVE DISEASE UNSPECIFIED: ICD-10-CM

## 2024-12-30 LAB
AORTIC VALVE MEAN GRADIENT: 24 MMHG
AORTIC VALVE PEAK VELOCITY: 3.3 M/S
AV PEAK GRADIENT: 44 MMHG
AVA (PEAK VEL): 1.09 CM2
AVA (VTI): 1.08 CM2
EJECTION FRACTION APICAL 4 CHAMBER: 61
EJECTION FRACTION: 60 %
LEFT VENTRICLE INTERNAL DIMENSION DIASTOLE: 4.5 CM (ref 3.5–6)
LEFT VENTRICULAR OUTFLOW TRACT DIAMETER: 2.1 CM
LV EJECTION FRACTION BIPLANE: 61 %
MITRAL VALVE E/E' RATIO: 9.1
RIGHT VENTRICLE PEAK SYSTOLIC PRESSURE: 29 MMHG

## 2024-12-30 PROCEDURE — 93306 TTE W/DOPPLER COMPLETE: CPT

## 2024-12-30 PROCEDURE — 93306 TTE W/DOPPLER COMPLETE: CPT | Performed by: INTERNAL MEDICINE

## 2024-12-31 ENCOUNTER — HOSPITAL ENCOUNTER (OUTPATIENT)
Facility: HOSPITAL | Age: 74
Setting detail: OBSERVATION
Discharge: HOME | End: 2025-01-01
Attending: STUDENT IN AN ORGANIZED HEALTH CARE EDUCATION/TRAINING PROGRAM | Admitting: STUDENT IN AN ORGANIZED HEALTH CARE EDUCATION/TRAINING PROGRAM
Payer: MEDICARE

## 2024-12-31 ENCOUNTER — APPOINTMENT (OUTPATIENT)
Dept: CARDIOLOGY | Facility: HOSPITAL | Age: 74
End: 2024-12-31
Payer: MEDICARE

## 2024-12-31 ENCOUNTER — APPOINTMENT (OUTPATIENT)
Dept: RADIOLOGY | Facility: HOSPITAL | Age: 74
End: 2024-12-31
Payer: MEDICARE

## 2024-12-31 DIAGNOSIS — J44.1 COPD WITH ACUTE EXACERBATION (MULTI): ICD-10-CM

## 2024-12-31 DIAGNOSIS — R06.00 DYSPNEA, UNSPECIFIED TYPE: Primary | ICD-10-CM

## 2024-12-31 LAB
ALBUMIN SERPL BCP-MCNC: 3.7 G/DL (ref 3.4–5)
ALP SERPL-CCNC: 89 U/L (ref 33–136)
ALT SERPL W P-5'-P-CCNC: 41 U/L (ref 10–52)
ANION GAP SERPL CALC-SCNC: 11 MMOL/L (ref 10–20)
APTT PPP: 36 SECONDS (ref 27–38)
AST SERPL W P-5'-P-CCNC: 28 U/L (ref 9–39)
ATRIAL RATE: 56 BPM
BASOPHILS # BLD AUTO: 0.05 X10*3/UL (ref 0–0.1)
BASOPHILS NFR BLD AUTO: 0.4 %
BILIRUB SERPL-MCNC: 1.1 MG/DL (ref 0–1.2)
BNP SERPL-MCNC: 356 PG/ML (ref 0–99)
BUN SERPL-MCNC: 16 MG/DL (ref 6–23)
CALCIUM SERPL-MCNC: 8.9 MG/DL (ref 8.6–10.3)
CARDIAC TROPONIN I PNL SERPL HS: 10 NG/L (ref 0–20)
CARDIAC TROPONIN I PNL SERPL HS: 10 NG/L (ref 0–20)
CHLORIDE SERPL-SCNC: 103 MMOL/L (ref 98–107)
CO2 SERPL-SCNC: 29 MMOL/L (ref 21–32)
CREAT SERPL-MCNC: 0.77 MG/DL (ref 0.5–1.3)
EGFRCR SERPLBLD CKD-EPI 2021: >90 ML/MIN/1.73M*2
EOSINOPHIL # BLD AUTO: 0.1 X10*3/UL (ref 0–0.4)
EOSINOPHIL NFR BLD AUTO: 0.7 %
ERYTHROCYTE [DISTWIDTH] IN BLOOD BY AUTOMATED COUNT: 14.5 % (ref 11.5–14.5)
FLUAV RNA RESP QL NAA+PROBE: NOT DETECTED
FLUBV RNA RESP QL NAA+PROBE: NOT DETECTED
GLUCOSE BLD MANUAL STRIP-MCNC: 118 MG/DL (ref 74–99)
GLUCOSE SERPL-MCNC: 113 MG/DL (ref 74–99)
HCT VFR BLD AUTO: 39.7 % (ref 41–52)
HGB BLD-MCNC: 12.7 G/DL (ref 13.5–17.5)
IMM GRANULOCYTES # BLD AUTO: 0.1 X10*3/UL (ref 0–0.5)
IMM GRANULOCYTES NFR BLD AUTO: 0.7 % (ref 0–0.9)
INR PPP: 1.5 (ref 0.9–1.1)
LYMPHOCYTES # BLD AUTO: 1.95 X10*3/UL (ref 0.8–3)
LYMPHOCYTES NFR BLD AUTO: 14.6 %
MAGNESIUM SERPL-MCNC: 2.09 MG/DL (ref 1.6–2.4)
MCH RBC QN AUTO: 27.1 PG (ref 26–34)
MCHC RBC AUTO-ENTMCNC: 32 G/DL (ref 32–36)
MCV RBC AUTO: 85 FL (ref 80–100)
MONOCYTES # BLD AUTO: 1.04 X10*3/UL (ref 0.05–0.8)
MONOCYTES NFR BLD AUTO: 7.8 %
NEUTROPHILS # BLD AUTO: 10.13 X10*3/UL (ref 1.6–5.5)
NEUTROPHILS NFR BLD AUTO: 75.8 %
NRBC BLD-RTO: 0 /100 WBCS (ref 0–0)
P AXIS: 83 DEGREES
P OFFSET: 177 MS
P ONSET: 146 MS
PLATELET # BLD AUTO: 271 X10*3/UL (ref 150–450)
POTASSIUM SERPL-SCNC: 4.3 MMOL/L (ref 3.5–5.3)
PR INTERVAL: 162 MS
PROT SERPL-MCNC: 6.9 G/DL (ref 6.4–8.2)
PROTHROMBIN TIME: 16.8 SECONDS (ref 9.8–12.8)
Q ONSET: 227 MS
QRS COUNT: 9 BEATS
QRS DURATION: 82 MS
QT INTERVAL: 478 MS
QTC CALCULATION(BAZETT): 461 MS
QTC FREDERICIA: 467 MS
R AXIS: -23 DEGREES
RBC # BLD AUTO: 4.68 X10*6/UL (ref 4.5–5.9)
SARS-COV-2 RNA RESP QL NAA+PROBE: DETECTED
SODIUM SERPL-SCNC: 139 MMOL/L (ref 136–145)
T AXIS: 59 DEGREES
T OFFSET: 466 MS
VENTRICULAR RATE: 56 BPM
WBC # BLD AUTO: 13.4 X10*3/UL (ref 4.4–11.3)

## 2024-12-31 PROCEDURE — 84484 ASSAY OF TROPONIN QUANT: CPT | Performed by: STUDENT IN AN ORGANIZED HEALTH CARE EDUCATION/TRAINING PROGRAM

## 2024-12-31 PROCEDURE — 82947 ASSAY GLUCOSE BLOOD QUANT: CPT | Mod: 59

## 2024-12-31 PROCEDURE — 85025 COMPLETE CBC W/AUTO DIFF WBC: CPT | Performed by: STUDENT IN AN ORGANIZED HEALTH CARE EDUCATION/TRAINING PROGRAM

## 2024-12-31 PROCEDURE — 71275 CT ANGIOGRAPHY CHEST: CPT

## 2024-12-31 PROCEDURE — 80053 COMPREHEN METABOLIC PANEL: CPT | Performed by: STUDENT IN AN ORGANIZED HEALTH CARE EDUCATION/TRAINING PROGRAM

## 2024-12-31 PROCEDURE — 83880 ASSAY OF NATRIURETIC PEPTIDE: CPT | Performed by: STUDENT IN AN ORGANIZED HEALTH CARE EDUCATION/TRAINING PROGRAM

## 2024-12-31 PROCEDURE — G0378 HOSPITAL OBSERVATION PER HR: HCPCS

## 2024-12-31 PROCEDURE — 2550000001 HC RX 255 CONTRASTS: Performed by: STUDENT IN AN ORGANIZED HEALTH CARE EDUCATION/TRAINING PROGRAM

## 2024-12-31 PROCEDURE — 2500000001 HC RX 250 WO HCPCS SELF ADMINISTERED DRUGS (ALT 637 FOR MEDICARE OP): Performed by: NURSE PRACTITIONER

## 2024-12-31 PROCEDURE — 2500000002 HC RX 250 W HCPCS SELF ADMINISTERED DRUGS (ALT 637 FOR MEDICARE OP, ALT 636 FOR OP/ED): Performed by: STUDENT IN AN ORGANIZED HEALTH CARE EDUCATION/TRAINING PROGRAM

## 2024-12-31 PROCEDURE — 71275 CT ANGIOGRAPHY CHEST: CPT | Mod: FOREIGN READ | Performed by: RADIOLOGY

## 2024-12-31 PROCEDURE — 94640 AIRWAY INHALATION TREATMENT: CPT

## 2024-12-31 PROCEDURE — 93005 ELECTROCARDIOGRAM TRACING: CPT

## 2024-12-31 PROCEDURE — 85610 PROTHROMBIN TIME: CPT | Performed by: STUDENT IN AN ORGANIZED HEALTH CARE EDUCATION/TRAINING PROGRAM

## 2024-12-31 PROCEDURE — 99285 EMERGENCY DEPT VISIT HI MDM: CPT | Mod: 25 | Performed by: STUDENT IN AN ORGANIZED HEALTH CARE EDUCATION/TRAINING PROGRAM

## 2024-12-31 PROCEDURE — 99223 1ST HOSP IP/OBS HIGH 75: CPT | Performed by: STUDENT IN AN ORGANIZED HEALTH CARE EDUCATION/TRAINING PROGRAM

## 2024-12-31 PROCEDURE — 2500000001 HC RX 250 WO HCPCS SELF ADMINISTERED DRUGS (ALT 637 FOR MEDICARE OP): Performed by: STUDENT IN AN ORGANIZED HEALTH CARE EDUCATION/TRAINING PROGRAM

## 2024-12-31 PROCEDURE — 36415 COLL VENOUS BLD VENIPUNCTURE: CPT | Performed by: STUDENT IN AN ORGANIZED HEALTH CARE EDUCATION/TRAINING PROGRAM

## 2024-12-31 PROCEDURE — 93010 ELECTROCARDIOGRAM REPORT: CPT | Performed by: INTERNAL MEDICINE

## 2024-12-31 PROCEDURE — 85730 THROMBOPLASTIN TIME PARTIAL: CPT | Performed by: STUDENT IN AN ORGANIZED HEALTH CARE EDUCATION/TRAINING PROGRAM

## 2024-12-31 PROCEDURE — 83735 ASSAY OF MAGNESIUM: CPT | Performed by: STUDENT IN AN ORGANIZED HEALTH CARE EDUCATION/TRAINING PROGRAM

## 2024-12-31 PROCEDURE — 87636 SARSCOV2 & INF A&B AMP PRB: CPT | Performed by: STUDENT IN AN ORGANIZED HEALTH CARE EDUCATION/TRAINING PROGRAM

## 2024-12-31 RX ORDER — BENZONATATE 100 MG/1
200 CAPSULE ORAL 3 TIMES DAILY PRN
Status: DISCONTINUED | OUTPATIENT
Start: 2024-12-31 | End: 2025-01-01 | Stop reason: HOSPADM

## 2024-12-31 RX ORDER — INSULIN LISPRO 100 [IU]/ML
0-10 INJECTION, SOLUTION INTRAVENOUS; SUBCUTANEOUS
Status: DISCONTINUED | OUTPATIENT
Start: 2024-12-31 | End: 2025-01-01 | Stop reason: HOSPADM

## 2024-12-31 RX ORDER — LISINOPRIL 20 MG/1
20 TABLET ORAL 2 TIMES DAILY
Status: DISCONTINUED | OUTPATIENT
Start: 2024-12-31 | End: 2025-01-01 | Stop reason: HOSPADM

## 2024-12-31 RX ORDER — IPRATROPIUM BROMIDE AND ALBUTEROL SULFATE 2.5; .5 MG/3ML; MG/3ML
3 SOLUTION RESPIRATORY (INHALATION)
Status: DISCONTINUED | OUTPATIENT
Start: 2025-01-01 | End: 2025-01-01 | Stop reason: HOSPADM

## 2024-12-31 RX ORDER — ACETAMINOPHEN 325 MG/1
650 TABLET ORAL EVERY 4 HOURS PRN
Status: DISCONTINUED | OUTPATIENT
Start: 2024-12-31 | End: 2025-01-01 | Stop reason: HOSPADM

## 2024-12-31 RX ORDER — ALBUTEROL SULFATE 90 UG/1
2 INHALANT RESPIRATORY (INHALATION) EVERY 4 HOURS PRN
Status: DISCONTINUED | OUTPATIENT
Start: 2024-12-31 | End: 2025-01-01 | Stop reason: HOSPADM

## 2024-12-31 RX ORDER — ACETAMINOPHEN 650 MG/1
650 SUPPOSITORY RECTAL EVERY 4 HOURS PRN
Status: DISCONTINUED | OUTPATIENT
Start: 2024-12-31 | End: 2025-01-01 | Stop reason: HOSPADM

## 2024-12-31 RX ORDER — ONDANSETRON 4 MG/1
4 TABLET, FILM COATED ORAL EVERY 8 HOURS PRN
Status: DISCONTINUED | OUTPATIENT
Start: 2024-12-31 | End: 2025-01-01 | Stop reason: HOSPADM

## 2024-12-31 RX ORDER — ATORVASTATIN CALCIUM 20 MG/1
40 TABLET, FILM COATED ORAL NIGHTLY
Status: DISCONTINUED | OUTPATIENT
Start: 2024-12-31 | End: 2025-01-01 | Stop reason: HOSPADM

## 2024-12-31 RX ORDER — IPRATROPIUM BROMIDE AND ALBUTEROL SULFATE 2.5; .5 MG/3ML; MG/3ML
3 SOLUTION RESPIRATORY (INHALATION) ONCE
Status: COMPLETED | OUTPATIENT
Start: 2024-12-31 | End: 2024-12-31

## 2024-12-31 RX ORDER — DEXAMETHASONE 6 MG/1
6 TABLET ORAL EVERY 24 HOURS
Status: DISCONTINUED | OUTPATIENT
Start: 2024-12-31 | End: 2025-01-01 | Stop reason: HOSPADM

## 2024-12-31 RX ORDER — GUAIFENESIN/DEXTROMETHORPHAN 100-10MG/5
5 SYRUP ORAL EVERY 4 HOURS PRN
Status: DISCONTINUED | OUTPATIENT
Start: 2024-12-31 | End: 2025-01-01 | Stop reason: HOSPADM

## 2024-12-31 RX ORDER — POLYETHYLENE GLYCOL 3350 17 G/17G
17 POWDER, FOR SOLUTION ORAL DAILY
Status: DISCONTINUED | OUTPATIENT
Start: 2024-12-31 | End: 2025-01-01 | Stop reason: HOSPADM

## 2024-12-31 RX ORDER — BUDESONIDE 0.5 MG/2ML
0.5 INHALANT ORAL
Status: DISCONTINUED | OUTPATIENT
Start: 2024-12-31 | End: 2025-01-01 | Stop reason: HOSPADM

## 2024-12-31 RX ORDER — SOTALOL HYDROCHLORIDE 120 MG/1
120 TABLET ORAL 2 TIMES DAILY
Status: DISCONTINUED | OUTPATIENT
Start: 2024-12-31 | End: 2025-01-01 | Stop reason: HOSPADM

## 2024-12-31 RX ORDER — ACETAMINOPHEN 160 MG/5ML
650 SOLUTION ORAL EVERY 4 HOURS PRN
Status: DISCONTINUED | OUTPATIENT
Start: 2024-12-31 | End: 2025-01-01 | Stop reason: HOSPADM

## 2024-12-31 RX ORDER — ONDANSETRON HYDROCHLORIDE 2 MG/ML
4 INJECTION, SOLUTION INTRAVENOUS EVERY 8 HOURS PRN
Status: DISCONTINUED | OUTPATIENT
Start: 2024-12-31 | End: 2025-01-01 | Stop reason: HOSPADM

## 2024-12-31 RX ORDER — NAPROXEN SODIUM 220 MG/1
81 TABLET, FILM COATED ORAL DAILY
Status: DISCONTINUED | OUTPATIENT
Start: 2024-12-31 | End: 2025-01-01 | Stop reason: HOSPADM

## 2024-12-31 RX ORDER — PANTOPRAZOLE SODIUM 40 MG/1
40 TABLET, DELAYED RELEASE ORAL DAILY
Status: DISCONTINUED | OUTPATIENT
Start: 2025-01-01 | End: 2025-01-01 | Stop reason: HOSPADM

## 2024-12-31 RX ORDER — METOPROLOL SUCCINATE 50 MG/1
100 TABLET, EXTENDED RELEASE ORAL DAILY
Status: DISCONTINUED | OUTPATIENT
Start: 2024-12-31 | End: 2025-01-01 | Stop reason: HOSPADM

## 2024-12-31 RX ORDER — SPIRONOLACTONE 25 MG/1
25 TABLET ORAL DAILY
Status: DISCONTINUED | OUTPATIENT
Start: 2024-12-31 | End: 2025-01-01 | Stop reason: HOSPADM

## 2024-12-31 RX ADMIN — IPRATROPIUM BROMIDE AND ALBUTEROL SULFATE 3 ML: .5; 3 SOLUTION RESPIRATORY (INHALATION) at 13:16

## 2024-12-31 RX ADMIN — BENZONATATE 200 MG: 100 CAPSULE ORAL at 22:46

## 2024-12-31 RX ADMIN — IOHEXOL 75 ML: 350 INJECTION, SOLUTION INTRAVENOUS at 14:13

## 2024-12-31 RX ADMIN — GUAIFENESIN AND DEXTROMETHORPHAN 5 ML: 100; 10 SYRUP ORAL at 18:16

## 2024-12-31 RX ADMIN — ALBUTEROL SULFATE 2 PUFF: 90 AEROSOL, METERED RESPIRATORY (INHALATION) at 22:46

## 2024-12-31 RX ADMIN — TIOTROPIUM BROMIDE INHALATION SPRAY 2 PUFF: 3.12 SPRAY, METERED RESPIRATORY (INHALATION) at 22:46

## 2024-12-31 RX ADMIN — GUAIFENESIN AND DEXTROMETHORPHAN 5 ML: 100; 10 SYRUP ORAL at 22:46

## 2024-12-31 SDOH — SOCIAL STABILITY: SOCIAL INSECURITY: WERE YOU ABLE TO COMPLETE ALL THE BEHAVIORAL HEALTH SCREENINGS?: YES

## 2024-12-31 SDOH — SOCIAL STABILITY: SOCIAL INSECURITY: DO YOU FEEL UNSAFE GOING BACK TO THE PLACE WHERE YOU ARE LIVING?: NO

## 2024-12-31 SDOH — SOCIAL STABILITY: SOCIAL INSECURITY
WITHIN THE LAST YEAR, HAVE YOU BEEN RAPED OR FORCED TO HAVE ANY KIND OF SEXUAL ACTIVITY BY YOUR PARTNER OR EX-PARTNER?: NO

## 2024-12-31 SDOH — SOCIAL STABILITY: SOCIAL INSECURITY: DOES ANYONE TRY TO KEEP YOU FROM HAVING/CONTACTING OTHER FRIENDS OR DOING THINGS OUTSIDE YOUR HOME?: NO

## 2024-12-31 SDOH — SOCIAL STABILITY: SOCIAL INSECURITY: WITHIN THE LAST YEAR, HAVE YOU BEEN AFRAID OF YOUR PARTNER OR EX-PARTNER?: NO

## 2024-12-31 SDOH — ECONOMIC STABILITY: FOOD INSECURITY: WITHIN THE PAST 12 MONTHS, THE FOOD YOU BOUGHT JUST DIDN'T LAST AND YOU DIDN'T HAVE MONEY TO GET MORE.: NEVER TRUE

## 2024-12-31 SDOH — SOCIAL STABILITY: SOCIAL INSECURITY: HAVE YOU HAD THOUGHTS OF HARMING ANYONE ELSE?: NO

## 2024-12-31 SDOH — SOCIAL STABILITY: SOCIAL INSECURITY
WITHIN THE LAST YEAR, HAVE YOU BEEN KICKED, HIT, SLAPPED, OR OTHERWISE PHYSICALLY HURT BY YOUR PARTNER OR EX-PARTNER?: NO

## 2024-12-31 SDOH — ECONOMIC STABILITY: FOOD INSECURITY: WITHIN THE PAST 12 MONTHS, YOU WORRIED THAT YOUR FOOD WOULD RUN OUT BEFORE YOU GOT THE MONEY TO BUY MORE.: NEVER TRUE

## 2024-12-31 SDOH — ECONOMIC STABILITY: INCOME INSECURITY: IN THE PAST 12 MONTHS HAS THE ELECTRIC, GAS, OIL, OR WATER COMPANY THREATENED TO SHUT OFF SERVICES IN YOUR HOME?: NO

## 2024-12-31 SDOH — SOCIAL STABILITY: SOCIAL INSECURITY: WITHIN THE LAST YEAR, HAVE YOU BEEN HUMILIATED OR EMOTIONALLY ABUSED IN OTHER WAYS BY YOUR PARTNER OR EX-PARTNER?: NO

## 2024-12-31 SDOH — SOCIAL STABILITY: SOCIAL INSECURITY: ARE THERE ANY APPARENT SIGNS OF INJURIES/BEHAVIORS THAT COULD BE RELATED TO ABUSE/NEGLECT?: NO

## 2024-12-31 SDOH — SOCIAL STABILITY: SOCIAL INSECURITY: ARE YOU OR HAVE YOU BEEN THREATENED OR ABUSED PHYSICALLY, EMOTIONALLY, OR SEXUALLY BY ANYONE?: NO

## 2024-12-31 SDOH — SOCIAL STABILITY: SOCIAL INSECURITY: DO YOU FEEL ANYONE HAS EXPLOITED OR TAKEN ADVANTAGE OF YOU FINANCIALLY OR OF YOUR PERSONAL PROPERTY?: NO

## 2024-12-31 SDOH — SOCIAL STABILITY: SOCIAL INSECURITY: HAVE YOU HAD ANY THOUGHTS OF HARMING ANYONE ELSE?: NO

## 2024-12-31 SDOH — SOCIAL STABILITY: SOCIAL INSECURITY: HAS ANYONE EVER THREATENED TO HURT YOUR FAMILY OR YOUR PETS?: NO

## 2024-12-31 SDOH — SOCIAL STABILITY: SOCIAL INSECURITY: ABUSE: ADULT

## 2024-12-31 ASSESSMENT — ACTIVITIES OF DAILY LIVING (ADL)
PATIENT'S MEMORY ADEQUATE TO SAFELY COMPLETE DAILY ACTIVITIES?: YES
HEARING - LEFT EAR: HEARING AID
FEEDING YOURSELF: INDEPENDENT
TOILETING: INDEPENDENT
GROOMING: INDEPENDENT
HEARING - RIGHT EAR: HEARING AID
LACK_OF_TRANSPORTATION: NO
BATHING: INDEPENDENT
JUDGMENT_ADEQUATE_SAFELY_COMPLETE_DAILY_ACTIVITIES: YES
ADEQUATE_TO_COMPLETE_ADL: YES
DRESSING YOURSELF: INDEPENDENT
WALKS IN HOME: INDEPENDENT

## 2024-12-31 ASSESSMENT — COGNITIVE AND FUNCTIONAL STATUS - GENERAL
MOBILITY SCORE: 24
DAILY ACTIVITIY SCORE: 24
PATIENT BASELINE BEDBOUND: NO

## 2024-12-31 ASSESSMENT — COLUMBIA-SUICIDE SEVERITY RATING SCALE - C-SSRS
1. IN THE PAST MONTH, HAVE YOU WISHED YOU WERE DEAD OR WISHED YOU COULD GO TO SLEEP AND NOT WAKE UP?: NO
6. HAVE YOU EVER DONE ANYTHING, STARTED TO DO ANYTHING, OR PREPARED TO DO ANYTHING TO END YOUR LIFE?: NO
2. HAVE YOU ACTUALLY HAD ANY THOUGHTS OF KILLING YOURSELF?: NO

## 2024-12-31 ASSESSMENT — LIFESTYLE VARIABLES
HAVE YOU EVER FELT YOU SHOULD CUT DOWN ON YOUR DRINKING: NO
HOW OFTEN DO YOU HAVE 6 OR MORE DRINKS ON ONE OCCASION: NEVER
HOW MANY STANDARD DRINKS CONTAINING ALCOHOL DO YOU HAVE ON A TYPICAL DAY: PATIENT DOES NOT DRINK
AUDIT-C TOTAL SCORE: 0
EVER HAD A DRINK FIRST THING IN THE MORNING TO STEADY YOUR NERVES TO GET RID OF A HANGOVER: NO
SKIP TO QUESTIONS 9-10: 1
HAVE PEOPLE ANNOYED YOU BY CRITICIZING YOUR DRINKING: NO
EVER FELT BAD OR GUILTY ABOUT YOUR DRINKING: NO
HOW OFTEN DO YOU HAVE A DRINK CONTAINING ALCOHOL: NEVER
AUDIT-C TOTAL SCORE: 0
TOTAL SCORE: 0

## 2024-12-31 ASSESSMENT — PAIN SCALES - GENERAL
PAINLEVEL_OUTOF10: 0 - NO PAIN
PAINLEVEL_OUTOF10: 0 - NO PAIN

## 2024-12-31 ASSESSMENT — PAIN - FUNCTIONAL ASSESSMENT: PAIN_FUNCTIONAL_ASSESSMENT: 0-10

## 2024-12-31 NOTE — H&P
"History Of Present Illness  Elroy Edmonds \"Len\" is a 74 y.o. male presenting with With a past medical history of A-fib on Eliquis, sotalol, diabetes?,  Hypertension, hyperlipidemia, pulmonary hypertension, COPD, chronic hypoxemic respiratory failure is on oxygen who presented to the ER with shortness of breath.  Patient says that he has chronic shortness of breath was diagnosed with COVID about a week ago.  He was taking it easy but was having a difficult time sleeping.  For the past 2 days he has noticed worsening shortness of breath especially when he tried to help his son around the house.  After a coughing fit he checked his oxygen once and it dropped into the 80s.  Denies any chest pain, denies any diarrhea had few streaks of blood with coughing earlier today.  Felt like he had pneumonia so came to the ER.  In the ER his saturating in the 90s on room air close was 61 respiratory rate 22 he feels much better after breathing treatments.  Blood work did show mild leukocytosis, CT chest was clear he had a recent echocardiogram as well.  COVID was positive..     Past Medical History  Past Medical History:   Diagnosis Date    Arcus senilis, bilateral 07/17/2018    Corneal arcus senilis, bilateral    Arcus senilis, bilateral 07/17/2018    Corneal arcus senilis, bilateral    Atrial fibrillation (Multi)     Combined forms of age-related cataract, bilateral 07/17/2018    Combined form of age-related cataract, both eyes    COPD (chronic obstructive pulmonary disease) (Multi)     Coronary artery disease     Dry eye syndrome of unspecified lacrimal gland 07/17/2018    Dry eye syndrome    Dry eye syndrome of unspecified lacrimal gland 07/17/2018    Dry eye syndrome    Hyperlipidemia     Hypertension     Personal history of other diseases of the circulatory system 04/20/2016    History of atrial fibrillation    Personal history of other diseases of the musculoskeletal system and connective tissue     History of " osteoarthritis    Personal history of other diseases of the nervous system and sense organs 04/20/2016    History of cataract    Presence of intraocular lens 07/17/2018    Pseudophakia of right eye    Presence of intraocular lens 07/17/2018    Pseudophakia of right eye    Strain of muscle(s) and tendon(s) of the rotator cuff of left shoulder, initial encounter 10/22/2019    Traumatic complete tear of left rotator cuff, initial encounter    Strain of muscle(s) and tendon(s) of the rotator cuff of left shoulder, subsequent encounter 01/14/2020    Traumatic complete tear of left rotator cuff, subsequent encounter    Unspecified cataract 04/20/2016    Cataract of right eye       Surgical History  Past Surgical History:   Procedure Laterality Date    BLEPHAROPTOSIS REPAIR Bilateral     CATARACT EXTRACTION  10/14/2016    Cataract Surgery    CHOLECYSTECTOMY  04/20/2016    Cholecystectomy    CORONARY ANGIOPLASTY WITH STENT PLACEMENT  07/17/2018    Cath Placement Of Stent 1    OTHER SURGICAL HISTORY  01/26/2022    Neck surgery    OTHER SURGICAL HISTORY  12/28/2021    Colonoscopy    OTHER SURGICAL HISTORY  12/28/2021    Percutaneous transluminal coronary angioplasty        Social History  He reports that he quit smoking about 25 years ago. His smoking use included cigarettes. He has never been exposed to tobacco smoke. He has never used smokeless tobacco. He reports that he does not currently use alcohol. He reports that he does not use drugs.    Family History  Family History   Problem Relation Name Age of Onset    Hypertension Mother      Atrial fibrillation Mother      Cataracts Mother      Glaucoma Mother      Heart attack Father      Cancer Father          Allergies  Jardiance [empagliflozin]    Review of Systems  As per HPI, comprehensive review of systems performed  Physical Exam  Constitutional:       Appearance: Normal appearance.   Eyes:      Extraocular Movements: Extraocular movements intact.      Pupils: Pupils  "are equal, round, and reactive to light.   Cardiovascular:      Rate and Rhythm: Normal rate and regular rhythm.   Pulmonary:      Effort: Pulmonary effort is normal.      Breath sounds: Wheezing present.   Abdominal:      General: There is no distension.      Palpations: Abdomen is soft.   Musculoskeletal:         General: Deformity present.   Neurological:      General: No focal deficit present.      Mental Status: He is alert. Mental status is at baseline.   Psychiatric:         Mood and Affect: Mood normal.          Last Recorded Vitals  Blood pressure 163/78, pulse 61, temperature 36.6 °C (97.9 °F), temperature source Temporal, resp. rate 20, height 1.778 m (5' 10\"), weight 113 kg (250 lb), SpO2 98%.    Relevant Results             Assessment/Plan   Assessment & Plan  COPD with acute exacerbation (Multi)      74-year-old male with past medical history of COPD, chronic hypoxemic respiratory failure, A-fib, diabetes, hypertension, hyperlipidemia, pulmonary artery hypertension admitted with COPD exacerbation, dyspnea with exertion    No evidence of pneumonia no infiltrates on chest x-ray  He is not hypoxemic at rest  Will put him on budesonide inhaler, Decadron, O2 support as needed  Robitussin for cough  No indication for antibiotics, will check a procalcitonin level  Continue regular home meds including aspirin, lisinopril, statin, sotalol, spironolactone and Eliquis  Insulin sliding scale as needed  DVT prophylaxis         Jalil Snell MD    "

## 2024-12-31 NOTE — ED PROVIDER NOTES
HPI: The patient is a 74-year-old man with history of A-fib on Eliquis and sotalol, diabetes, hypertension, hyperlipidemia, pulmonary hypertension, COPD who is on oxygen intermittently who presents to the Emergency Department with a chief complaint of shortness of breath.  Patient reports that he has chronic shortness of breath but it has been worse over the last couple days.  He reports that he was diagnosed with COVID a little over a week ago and has had some sinus congestion since.  He reports that he is having a difficult time sleeping and reports that he wakes up coughing each night.  He reports he is more comfortable standing up but if he tries to walk at all, he gets very short of breath and checks his saturations and then drop to the low 80s.  He reports that he is on oxygen at home but does not use it regularly has been using it over the last few days on 2 L due to his degree of shortness of breath.  Denies any chest pain abdominal pain weight gain or swelling his legs or recent travel trauma.  He reports he is taking his blood thinning medications for his A-fib.  He does report that he has noticed blood tinged sputum when he coughs.     PAST MEDICAL HISTORY:  as per HPI  ALLERGIES:  as per HPI  MEDICATIONS:  as per HPI  FAMILY HISTORY: as per HPI  SURGICAL HISTORY: as per HPI  SOCIAL HISTORY: as per HPI     PHYSICAL EXAM:  VITAL SIGNS: Nursing notes reviewed.  GENERAL:  Alert and interactive  EYES:   Eyes track.  ENT:  Airway patent.  RESPIRATORY: Tachypnea, no retractions.  Clear breath sounds in all lung fields.  CARDIOVASCULAR: Bradycardic rate [Regular rhythm.]  Radial pulses equal bilaterally.  Systolic ejection murmur  GASTROINTESTINAL:  No distension.  MUSCULOSKELETAL:  No deformity. no Calf swelling or tenderness.  NEUROLOGICAL:  Awake.  SKIN:  Dry.        MEDICAL DECISION MAKING (MDM):     DIAGNOSTIC STUDIES  Labs: Coagulation screen, BNP, CBC, CMP, magnesium level, COVID and flu swab,  troponin  Radiology: CTA pulmonary angiogram     EKG 1158  Per my interpretation:  Electrocardiogram ECG  RATE: 56  RHYTHM: [Sinus]  AXIS: [Normal]  INTERVALS: [Normal]  ST-T WAVE CHANGES: [Normal]  ABNORMALITIES/COMPARISON: Unchanged from most recent EKG aug 5 2024    EKG 1512  Per my interpretation:  Electrocardiogram ECG  RATE: 56  RHYTHM: [Sinus]  AXIS: [Normal]  INTERVALS: [Normal]  ST-T WAVE CHANGES: [Normal]  ABNORMALITIES/COMPARISON: Unchanged from earlier today.       REVIEW OF OLD RECORDS: Reviewed the echo report from yesterday     SUMMARY:  The patient is admitted to the Emergency Department for evaluation of above. Complete history and physical examination was performed by me.  Patient presents with dyspnea with exertion and is tachypneic and reports hypoxemia at home.  While patient is on his anticoagulation, his blood-tinged sputum raises concern for PE especially given he is so short of breath with clear lung sounds.  I sent him for CTA pulmonary angiogram.  EKG was not consistent occlusive MI and did not show evidence of acute right heart strain.  I suspect that his coughing at night but not during the day is most likely postnasal drip but this would not contribute to his shortness of breath to the degree that it is while the patient is walking around.  Given no weight gain or leg swelling, CHF seems less likely.  I did review the echo from yesterday which shows no effusion and shows a moderate least severe aortic stenosis.  This is less likely contributing to his presentation today and is overall reassuring.  Patient's blood work is reassuring.  Troponins were elevated BNP was only slightly elevated.  He did have a slight leukocytosis but did not seem septic.  No significant anemia.  COVID test came back positive but I suspect that this is due to him having had COVID a week ago and his symptoms today do not seems consistent with COVID.  CTA was negative for acute pathology.  Given his reassuring  workup and the uncertainty related what is causing him to be short of breath and tachypneic with clear breath sounds and reassuring pulmonary exam on CT, patient will be admitted for further evaluation and care.     DIAGNOSIS:    Dyspnea     DISPOSITION:    1) admission     Kev Baum MD  12/31/24 5624

## 2025-01-01 VITALS
DIASTOLIC BLOOD PRESSURE: 79 MMHG | OXYGEN SATURATION: 92 % | HEART RATE: 67 BPM | HEIGHT: 70 IN | TEMPERATURE: 98.8 F | WEIGHT: 257.72 LBS | SYSTOLIC BLOOD PRESSURE: 146 MMHG | RESPIRATION RATE: 20 BRPM | BODY MASS INDEX: 36.9 KG/M2

## 2025-01-01 LAB
ANION GAP SERPL CALC-SCNC: 12 MMOL/L (ref 10–20)
ATRIAL RATE: 56 BPM
BASOPHILS # BLD AUTO: 0.05 X10*3/UL (ref 0–0.1)
BASOPHILS NFR BLD AUTO: 0.4 %
BUN SERPL-MCNC: 17 MG/DL (ref 6–23)
CALCIUM SERPL-MCNC: 8.7 MG/DL (ref 8.6–10.3)
CHLORIDE SERPL-SCNC: 104 MMOL/L (ref 98–107)
CO2 SERPL-SCNC: 28 MMOL/L (ref 21–32)
CREAT SERPL-MCNC: 0.79 MG/DL (ref 0.5–1.3)
EGFRCR SERPLBLD CKD-EPI 2021: >90 ML/MIN/1.73M*2
EOSINOPHIL # BLD AUTO: 0.14 X10*3/UL (ref 0–0.4)
EOSINOPHIL NFR BLD AUTO: 1.2 %
ERYTHROCYTE [DISTWIDTH] IN BLOOD BY AUTOMATED COUNT: 14.4 % (ref 11.5–14.5)
GLUCOSE BLD MANUAL STRIP-MCNC: 106 MG/DL (ref 74–99)
GLUCOSE BLD MANUAL STRIP-MCNC: 126 MG/DL (ref 74–99)
GLUCOSE SERPL-MCNC: 93 MG/DL (ref 74–99)
HCT VFR BLD AUTO: 37.9 % (ref 41–52)
HGB BLD-MCNC: 12.2 G/DL (ref 13.5–17.5)
IMM GRANULOCYTES # BLD AUTO: 0.09 X10*3/UL (ref 0–0.5)
IMM GRANULOCYTES NFR BLD AUTO: 0.8 % (ref 0–0.9)
LYMPHOCYTES # BLD AUTO: 2.16 X10*3/UL (ref 0.8–3)
LYMPHOCYTES NFR BLD AUTO: 18.8 %
MCH RBC QN AUTO: 27 PG (ref 26–34)
MCHC RBC AUTO-ENTMCNC: 32.2 G/DL (ref 32–36)
MCV RBC AUTO: 84 FL (ref 80–100)
MONOCYTES # BLD AUTO: 1.1 X10*3/UL (ref 0.05–0.8)
MONOCYTES NFR BLD AUTO: 9.6 %
NEUTROPHILS # BLD AUTO: 7.95 X10*3/UL (ref 1.6–5.5)
NEUTROPHILS NFR BLD AUTO: 69.2 %
NRBC BLD-RTO: 0 /100 WBCS (ref 0–0)
P AXIS: 18 DEGREES
P OFFSET: 199 MS
P ONSET: 137 MS
PLATELET # BLD AUTO: 248 X10*3/UL (ref 150–450)
POTASSIUM SERPL-SCNC: 4.3 MMOL/L (ref 3.5–5.3)
PR INTERVAL: 180 MS
PROCALCITONIN SERPL-MCNC: 0.02 NG/ML
Q ONSET: 227 MS
QRS COUNT: 9 BEATS
QRS DURATION: 88 MS
QT INTERVAL: 500 MS
QTC CALCULATION(BAZETT): 482 MS
QTC FREDERICIA: 489 MS
R AXIS: -22 DEGREES
RBC # BLD AUTO: 4.52 X10*6/UL (ref 4.5–5.9)
SODIUM SERPL-SCNC: 140 MMOL/L (ref 136–145)
T AXIS: 55 DEGREES
T OFFSET: 477 MS
VENTRICULAR RATE: 56 BPM
WBC # BLD AUTO: 11.5 X10*3/UL (ref 4.4–11.3)

## 2025-01-01 PROCEDURE — 94640 AIRWAY INHALATION TREATMENT: CPT

## 2025-01-01 PROCEDURE — 36415 COLL VENOUS BLD VENIPUNCTURE: CPT | Performed by: STUDENT IN AN ORGANIZED HEALTH CARE EDUCATION/TRAINING PROGRAM

## 2025-01-01 PROCEDURE — 2500000001 HC RX 250 WO HCPCS SELF ADMINISTERED DRUGS (ALT 637 FOR MEDICARE OP): Performed by: STUDENT IN AN ORGANIZED HEALTH CARE EDUCATION/TRAINING PROGRAM

## 2025-01-01 PROCEDURE — G0378 HOSPITAL OBSERVATION PER HR: HCPCS

## 2025-01-01 PROCEDURE — 2500000002 HC RX 250 W HCPCS SELF ADMINISTERED DRUGS (ALT 637 FOR MEDICARE OP, ALT 636 FOR OP/ED): Mod: MUE | Performed by: NURSE PRACTITIONER

## 2025-01-01 PROCEDURE — 80048 BASIC METABOLIC PNL TOTAL CA: CPT | Performed by: STUDENT IN AN ORGANIZED HEALTH CARE EDUCATION/TRAINING PROGRAM

## 2025-01-01 PROCEDURE — 2500000002 HC RX 250 W HCPCS SELF ADMINISTERED DRUGS (ALT 637 FOR MEDICARE OP, ALT 636 FOR OP/ED): Performed by: STUDENT IN AN ORGANIZED HEALTH CARE EDUCATION/TRAINING PROGRAM

## 2025-01-01 PROCEDURE — 82947 ASSAY GLUCOSE BLOOD QUANT: CPT | Mod: 59

## 2025-01-01 PROCEDURE — 84145 PROCALCITONIN (PCT): CPT | Mod: ELYLAB | Performed by: STUDENT IN AN ORGANIZED HEALTH CARE EDUCATION/TRAINING PROGRAM

## 2025-01-01 PROCEDURE — 85025 COMPLETE CBC W/AUTO DIFF WBC: CPT | Performed by: STUDENT IN AN ORGANIZED HEALTH CARE EDUCATION/TRAINING PROGRAM

## 2025-01-01 PROCEDURE — 99239 HOSP IP/OBS DSCHRG MGMT >30: CPT | Performed by: HOSPITALIST

## 2025-01-01 RX ORDER — METHYLPREDNISOLONE 4 MG/1
TABLET ORAL
Qty: 21 TABLET | Refills: 0 | Status: SHIPPED | OUTPATIENT
Start: 2025-01-01 | End: 2025-01-07

## 2025-01-01 RX ORDER — GUAIFENESIN 600 MG/1
1200 TABLET, EXTENDED RELEASE ORAL 2 TIMES DAILY
Qty: 20 TABLET | Refills: 0 | Status: SHIPPED | OUTPATIENT
Start: 2025-01-01 | End: 2025-01-06

## 2025-01-01 RX ORDER — AZITHROMYCIN 500 MG/1
500 TABLET, FILM COATED ORAL DAILY
Qty: 5 TABLET | Refills: 0 | Status: SHIPPED | OUTPATIENT
Start: 2025-01-01 | End: 2025-01-06

## 2025-01-01 RX ADMIN — BUDESONIDE 0.5 MG: 0.5 INHALANT RESPIRATORY (INHALATION) at 07:12

## 2025-01-01 RX ADMIN — IPRATROPIUM BROMIDE AND ALBUTEROL SULFATE 3 ML: 2.5; .5 SOLUTION RESPIRATORY (INHALATION) at 01:12

## 2025-01-01 RX ADMIN — TIOTROPIUM BROMIDE INHALATION SPRAY 2 PUFF: 3.12 SPRAY, METERED RESPIRATORY (INHALATION) at 08:33

## 2025-01-01 RX ADMIN — ASPIRIN 81 MG: 81 TABLET, CHEWABLE ORAL at 08:33

## 2025-01-01 RX ADMIN — SPIRONOLACTONE 25 MG: 25 TABLET ORAL at 08:33

## 2025-01-01 RX ADMIN — APIXABAN 5 MG: 5 TABLET, FILM COATED ORAL at 08:33

## 2025-01-01 RX ADMIN — IPRATROPIUM BROMIDE AND ALBUTEROL SULFATE 3 ML: 2.5; .5 SOLUTION RESPIRATORY (INHALATION) at 07:07

## 2025-01-01 RX ADMIN — SOTALOL HYDROCHLORIDE 120 MG: 120 TABLET ORAL at 08:33

## 2025-01-01 RX ADMIN — LISINOPRIL 20 MG: 20 TABLET ORAL at 08:33

## 2025-01-01 ASSESSMENT — COGNITIVE AND FUNCTIONAL STATUS - GENERAL
MOBILITY SCORE: 24
DAILY ACTIVITIY SCORE: 24

## 2025-01-01 ASSESSMENT — PAIN SCALES - GENERAL: PAINLEVEL_OUTOF10: 0 - NO PAIN

## 2025-01-01 NOTE — CARE PLAN
Problem: Pain - Adult  Goal: Verbalizes/displays adequate comfort level or baseline comfort level  Outcome: Progressing     Problem: Safety - Adult  Goal: Free from fall injury  Outcome: Progressing     Problem: Discharge Planning  Goal: Discharge to home or other facility with appropriate resources  Outcome: Progressing     Problem: Chronic Conditions and Co-morbidities  Goal: Patient's chronic conditions and co-morbidity symptoms are monitored and maintained or improved  Outcome: Progressing     Problem: Diabetes  Goal: Achieve decreasing blood glucose levels by end of shift  Outcome: Progressing  Goal: Increase stability of blood glucose readings by end of shift  Outcome: Progressing  Goal: Decrease in ketones present in urine by end of shift  Outcome: Progressing  Goal: Maintain electrolyte levels within acceptable range throughout shift  Outcome: Progressing  Goal: Maintain glucose levels >70mg/dl to <250mg/dl throughout shift  Outcome: Progressing  Goal: No changes in neurological exam by end of shift  Outcome: Progressing  Goal: Learn about and adhere to nutrition recommendations by end of shift  Outcome: Progressing  Goal: Vital signs within normal range for age by end of shift  Outcome: Progressing  Goal: Increase self care and/or family involovement by end of shift  Outcome: Progressing  Goal: Receive DSME education by end of shift  Outcome: Progressing   The patient's goals for the shift include rest     The clinical goals for the shift include Patient will maintain Spo2 >90% throughout shift

## 2025-01-01 NOTE — NURSING NOTE
AVS printed and reviewed with patient. All belonging sent with patient. New medication discussed with patient.

## 2025-01-01 NOTE — SIGNIFICANT EVENT
"Capacity Assessment Tool    \"Capacity\" is the \"ability\" to make a decision.  The decision in question must be specific (one decision), relevant to a patient's current condition (appropriate), and timely (neither prospective nor retrospective).    Capacity varies based on knowledge base (explanation/understanding of clinical information), cognitive processing, acute psychiatric illness, and other clinical conditions.    In order to be deemed \"capacitated\" to make a single decision at one point in time, a patient must demonstrate all 4 of the following elements:    *Ability to consistently communicate a choice (consistent over time with adequate information)  *Ability to understand the relevant information (accurate knowledge of condition)  *Ability to appreciate the situation and its consequences (risks/benefits, pros/cons)  *Ability to reason about treatment options (without undue influence of a person or condition, eg. suicidality or acute psychosis)      Current Decision    Clinical issue:   AMA    Did the appropriate team address relevant information with the patient:  Yes    Date:      If \"NO\" is selected for appropriate team, then please discuss with the appropriate team.  The appropriate team should be encouraged to address relevant information with the patient AND reevaluate capacity when appropriate.    Capacity Evaluation    Patient demonstrates ability to consistently communicate choice:  Yes     Patient demonstrates ability to understand the relevant information:  Yes     Patient demonstrates ability to appreciate the situation and its consequences:  Yes     Patient demonstrates ability to reason about treatment options:  Yes     If ANY of the above items are answered \"NO,\" the patient LACKS CAPACITY for that specific decision at hand, at that specific time.  Further capacity evaluations can be done as needed.         "

## 2025-01-01 NOTE — DISCHARGE SUMMARY
"Discharge Diagnosis  COPD with acute exacerbation (Multi)    Issues Requiring Follow-Up  None     Discharge Meds     Medication List      START taking these medications     azithromycin 500 mg tablet; Commonly known as: Zithromax; Take 1 tablet   (500 mg) by mouth once daily for 5 days.   guaiFENesin 600 mg 12 hr tablet; Commonly known as: Mucinex; Take 2   tablets (1,200 mg) by mouth 2 times a day for 10 doses. Do not crush,   chew, or split.   methylPREDNISolone 4 mg tablets; Commonly known as: Medrol Dospak; Take   as directed on package.     CONTINUE taking these medications     albuterol 90 mcg/actuation aerosol powdr breath activated inhaler   apixaban 5 mg tablet; Commonly known as: Eliquis   aspirin 81 mg chewable tablet   atorvastatin 40 mg tablet; Commonly known as: Lipitor   budesonide-formoteroL 160-4.5 mcg/actuation inhaler; Commonly known as:   Symbicort   fluocinonide 0.05 % ointment; Commonly known as: Lidex   lisinopril 20 mg tablet   nitroglycerin 0.4 mg SL tablet; Commonly known as: Nitrostat; Place 1   tablet under the tongue every 5 minutes as needed for Chest pain. May   repeat up to 3 times. Call 911 if pain persists.   omeprazole OTC 20 mg EC tablet; Commonly known as: PriLOSEC OTC   sotalol 120 mg tablet; Commonly known as: Betapace; Take 1 tablet (120   mg) by mouth 2 times a day.   Spiriva Respimat 2.5 mcg/actuation inhaler; Generic drug: tiotropium   spironolactone 25 mg tablet; Commonly known as: Aldactone     STOP taking these medications     Combivent Respimat  mcg/actuation inhaler; Generic drug:   ipratropium-albuteroL       Test Results Pending At Discharge  Pending Labs       Order Current Status    Procalcitonin In process            Hospital Course     Elroy Edmonds \"Bill\" is a 74 y.o. male presenting with With a past medical history of A-fib on Eliquis, sotalol, diabetes?,  Hypertension, hyperlipidemia, pulmonary hypertension, COPD, chronic hypoxemic respiratory failure " is on oxygen who presented to the ER with shortness of breath.  Patient says that he has chronic shortness of breath was diagnosed with COVID about a week ago.  He was taking it easy but was having a difficult time sleeping.  For the past 2 days he has noticed worsening shortness of breath especially when he tried to help his son around the house.  After a coughing fit he checked his oxygen once and it dropped into the 80s.  Denies any chest pain, denies any diarrhea had few streaks of blood with coughing earlier today.  Felt like he had pneumonia so came to the ER.  In the ER his saturating in the 90s on room air close was 61 respiratory rate 22 he feels much better after breathing treatments.  Blood work did show mild leukocytosis, CT chest was clear he had a recent echocardiogram as well.  COVID was positive..     Consulted to the hospital for observation.  Patient was treated with albuterol Atrovent nebulizer.  He was given dexamethasone 4 mg.  6 mg daily.  He was given azithromycin 500 mg daily.  He was discharged home in stable condition on Medrol Dosepak, Mucinex, and Z-Hung for 5 days.  Spine physician 1 to 2 weeks.    Pertinent Physical Exam At Time of Discharge  Physical Exam      Constitutional:       Appearance: Normal appearance.   Eyes:      Extraocular Movements: Extraocular movements intact.      Pupils: Pupils are equal, round, and reactive to light.   Cardiovascular:      Rate and Rhythm: Normal rate and regular rhythm.   Pulmonary:      Effort: Pulmonary effort is normal.      Breath sounds: Wheezing present.   Abdominal:      General: There is no distension.      Palpations: Abdomen is soft.   Musculoskeletal:         General: Deformity present.   Neurological:      General: No focal deficit present.      Mental Status: He is alert. Mental status is at baseline.   Psychiatric:         Mood and Affect: Mood normal.      Outpatient Follow-Up  Future Appointments   Date Time Provider Department  [place]   1/6/2025  3:45 PM Law Luo MD [id number] [place]   2/21/2025 11:00 AM Law Luo MD [id number] [place]     Discharge time >30 min       Sanju Stuart MD

## 2025-01-01 NOTE — NURSING NOTE
"Pt wandering goel without mask, demanding transport, pt educated on need for mask and to return to room and that transport has been requested, pt continues to yell that we have \"no standards and our systems are useless\"   "

## 2025-01-01 NOTE — NURSING NOTE
"Patient called this RN into room asking for morning meds. This RN informed patient he would get all of his morning meds between 8am-9:30am. Patient responded \"Then youll just be double dosing me because I'm taking mine now\". This RN reeducated patient the importance of taking the medications we provide so we are able to know exactly what he is taking, as Ramirez Braden NP educated him on last night. Patient stated \"I always take my meds at this time you aren't allowed to change that\". This RN informed patient that morning med pass is scheduled for 9am and that is when the doctors want medications to be given. Patient stated \"I don't care what you say I'm taking my own\". NP and hospitalist notified of conversation with patient.   "

## 2025-01-01 NOTE — NURSING NOTE
NP Ramirez Braden at bedside to discuss plan of care with patient.   Patient now agreeable to stay. NP added nebulizer treatments and PRN's for patient.

## 2025-01-01 NOTE — NURSING NOTE
"Patient refusing all meds at this time. Patient pulled out home rebecca chino and said \"I already took everything and my doctor said not to take anything you guys  give me unless I have his word of mouth\". Patient also requesting to go home because \"We are not doing anything for him\". NP Ramirez Braden notified.   "

## 2025-01-02 ENCOUNTER — PATIENT OUTREACH (OUTPATIENT)
Dept: PRIMARY CARE | Facility: CLINIC | Age: 75
End: 2025-01-02
Payer: MEDICARE

## 2025-01-02 NOTE — PROGRESS NOTES
Discharge Facility:  [facility]  Discharge Diagnosis:  COPD  Admission Date:  12/31/24  Discharge Date: 1/1/25    PCP Appointment Date: Message sent to Dr Diaz's office  Specialist Appointment Date:   JAN 6 2025 03:45 PM - Cardiology Hospital Discharge  [facility] - Law Luo MD     Hospital Encounter and Summary Linked: Yes  See discharge assessment below for further details     Engagement  Call Start Time: 1038 (1/2/2025 10:51 AM)    Medications  Medications reviewed with patient/caregiver?: Yes (1/2/2025 10:51 AM)  Is the patient having any side effects they believe may be caused by any medication additions or changes?: No (1/2/2025 10:51 AM)  Does the patient have all medications ordered at discharge?: Yes (1/2/2025 10:51 AM)  Prescription Comments: START taking these medications     azithromycin 500 mg tablet; Commonly known as: Zithromax; Take 1 tablet   (500 mg) by mouth once daily for 5 days.   guaiFENesin 600 mg 12 hr tablet; Commonly known as: Mucinex; Take 2   tablets (1,200 mg) by mouth 2 times a day for 10 doses. Do not crush,   chew, or split.   methylPREDNISolone 4 mg tablets; Commonly known as: Medrol Dospak; Take   as directed on package. (1/2/2025 10:51 AM)  Medication Comments: n/a (1/2/2025 10:51 AM)    Appointments  Does the patient have a primary care provider?: Yes (1/2/2025 10:51 AM)  Care Management Interventions: Advised patient to make appointment (1/2/2025 10:51 AM)  Care Management Interventions: Advised patient to keep appointment (1/2/2025 10:51 AM)    Self Management  What is the home health agency?: n/a (1/2/2025 10:51 AM)  What Durable Medical Equipment (DME) was ordered?: continues on oxygen (1/2/2025 10:51 AM)    Patient Teaching  Does the patient have access to their discharge instructions?: Yes (1/2/2025 10:51 AM)  Care Management Interventions: Reviewed instructions with patient (1/2/2025 10:51 AM)  What is the patient's perception of their health  status since discharge?: Improving (1/2/2025 10:51 AM)  Is the patient/caregiver able to teach back the hierarchy of who to call/visit for symptoms/problems? PCP, Specialist, Home Health nurse, Urgent Care, ED, 911: Yes (1/2/2025 10:51 AM)    Wrap Up  Wrap Up Additional Comments: Patient has oxygen at home that he states he uses as needed--which mostly at night. Patient states his cough has improved. Reviewed new medications and patient has an understanding of indication. Patient is taking holter monitor back today and will make an appt with Dr Whitt. REviewed upcoming appts and sent a message to Dr Diaz's office for follow up. This CM gives contact information for nonurgent matters (1/2/2025 10:51 AM)

## 2025-01-06 ENCOUNTER — APPOINTMENT (OUTPATIENT)
Dept: CARDIOLOGY | Facility: CLINIC | Age: 75
End: 2025-01-06
Payer: MEDICARE

## 2025-01-06 VITALS
SYSTOLIC BLOOD PRESSURE: 132 MMHG | DIASTOLIC BLOOD PRESSURE: 64 MMHG | BODY MASS INDEX: 37.08 KG/M2 | HEART RATE: 64 BPM | HEIGHT: 70 IN | WEIGHT: 259 LBS

## 2025-01-06 DIAGNOSIS — I77.89 ASCENDING AORTA ENLARGEMENT (CMS-HCC): ICD-10-CM

## 2025-01-06 DIAGNOSIS — Z98.61 CAD S/P PERCUTANEOUS CORONARY ANGIOPLASTY: ICD-10-CM

## 2025-01-06 DIAGNOSIS — I25.10 CAD S/P PERCUTANEOUS CORONARY ANGIOPLASTY: ICD-10-CM

## 2025-01-06 DIAGNOSIS — I48.0 PAROXYSMAL ATRIAL FIBRILLATION (MULTI): ICD-10-CM

## 2025-01-06 DIAGNOSIS — I35.0 AORTIC VALVE STENOSIS, ETIOLOGY OF CARDIAC VALVE DISEASE UNSPECIFIED: ICD-10-CM

## 2025-01-06 DIAGNOSIS — U07.1 COVID-19: ICD-10-CM

## 2025-01-06 DIAGNOSIS — E78.2 MIXED HYPERLIPIDEMIA: ICD-10-CM

## 2025-01-06 DIAGNOSIS — Z87.891 FORMER CIGARETTE SMOKER: ICD-10-CM

## 2025-01-06 DIAGNOSIS — J44.9 CHRONIC OBSTRUCTIVE PULMONARY DISEASE, UNSPECIFIED COPD TYPE (MULTI): ICD-10-CM

## 2025-01-06 DIAGNOSIS — I10 BENIGN ESSENTIAL HYPERTENSION: ICD-10-CM

## 2025-01-06 PROCEDURE — 3075F SYST BP GE 130 - 139MM HG: CPT | Performed by: INTERNAL MEDICINE

## 2025-01-06 PROCEDURE — 4010F ACE/ARB THERAPY RXD/TAKEN: CPT | Performed by: INTERNAL MEDICINE

## 2025-01-06 PROCEDURE — 99214 OFFICE O/P EST MOD 30 MIN: CPT | Performed by: INTERNAL MEDICINE

## 2025-01-06 PROCEDURE — 3008F BODY MASS INDEX DOCD: CPT | Performed by: INTERNAL MEDICINE

## 2025-01-06 PROCEDURE — 1159F MED LIST DOCD IN RCRD: CPT | Performed by: INTERNAL MEDICINE

## 2025-01-06 PROCEDURE — 1123F ACP DISCUSS/DSCN MKR DOCD: CPT | Performed by: INTERNAL MEDICINE

## 2025-01-06 PROCEDURE — 1036F TOBACCO NON-USER: CPT | Performed by: INTERNAL MEDICINE

## 2025-01-06 PROCEDURE — 3078F DIAST BP <80 MM HG: CPT | Performed by: INTERNAL MEDICINE

## 2025-01-06 NOTE — PATIENT INSTRUCTIONS
Echocardiogram to be done in 1 year  Follow up office visit in 1 year.    Continue same medications/treatment.  Patient educated on proper medication use.  Patient educated on risk factor modification.  Please bring any lab results from other providers / physicians to your next appointment.    Please bring all medicines, vitamins and herbal supplements with you when you come to the office.    Prescriptions will not be filled unless you are compliant with your follow up appointments or have a follow up  appointment scheduled as per instruction of your physician.  Refills should be requested at the time of  Your visit.    Jillian PRESTON LPN, am scribing for and in the presence of Dr. Law Luo MD, FACC

## 2025-01-06 NOTE — PROGRESS NOTES
CARDIOLOGY OFFICE VISIT      CHIEF COMPLAINT      HISTORY OF PRESENT ILLNESS  The patient states he is recently hospitalized with exacerbation of his COPD.  He states he was told he had COVID.  He states she was not placed on the monitor when he is in the hospital.  He has returned his Holter monitor but it has not been developed.  I told him I will call him when I read it.  His echocardiogram is unchanged.  It demonstrates normal left ventricular systolic function and moderate aortic stenosis.  I explained this to him.  He is not having any chest discomfort.  He states his breathing is back to normal.  He denies palpitations and syncope.  He denies any problems current medications.    Impression:  1. Paroxysmal atrial fibrillation controlled on sotalol and metoprolol and anticoagulated with Eliquis.  2. Coronary artery disease status post angioplasty with drug-eluting stent to the mid LAD and angioplasty of the first diagonal on April 17, 2017.   3. Normal left ventricular systolic function   4. Mild ascending aortic enlargement   5. Aortic stenosis, moderate  6. Hyperlipidemia  7. Obesity  8. COPD, sleeps with oxygen at night  9. Hypertension  10. COVID-19, November 2020  11. Former smoker     Please excuse any errors in grammar or translation related to this dictation. Voice recognition software was utilized to prepare this document.          Past Medical History  Past Medical History:   Diagnosis Date    Arcus senilis, bilateral 07/17/2018    Corneal arcus senilis, bilateral    Arcus senilis, bilateral 07/17/2018    Corneal arcus senilis, bilateral    Atrial fibrillation (Multi)     Combined forms of age-related cataract, bilateral 07/17/2018    Combined form of age-related cataract, both eyes    COPD (chronic obstructive pulmonary disease) (Multi)     Coronary artery disease     Dry eye syndrome of unspecified lacrimal gland 07/17/2018    Dry eye syndrome    Dry eye syndrome of unspecified lacrimal gland  2018    Dry eye syndrome    Hyperlipidemia     Hypertension     Personal history of other diseases of the circulatory system 2016    History of atrial fibrillation    Personal history of other diseases of the musculoskeletal system and connective tissue     History of osteoarthritis    Personal history of other diseases of the nervous system and sense organs 2016    History of cataract    Presence of intraocular lens 2018    Pseudophakia of right eye    Presence of intraocular lens 2018    Pseudophakia of right eye    Strain of muscle(s) and tendon(s) of the rotator cuff of left shoulder, initial encounter 10/22/2019    Traumatic complete tear of left rotator cuff, initial encounter    Strain of muscle(s) and tendon(s) of the rotator cuff of left shoulder, subsequent encounter 2020    Traumatic complete tear of left rotator cuff, subsequent encounter    Unspecified cataract 2016    Cataract of right eye       Social History  Social History     Tobacco Use    Smoking status: Former     Current packs/day: 0.00     Types: Cigarettes     Quit date:      Years since quittin.0     Passive exposure: Never    Smokeless tobacco: Never   Substance Use Topics    Alcohol use: Not Currently     Comment: quit in     Drug use: Never       Family History     Family History   Problem Relation Name Age of Onset    Hypertension Mother      Atrial fibrillation Mother      Cataracts Mother      Glaucoma Mother      Heart attack Father      Cancer Father          Allergies:  Allergies   Allergen Reactions    Jardiance [Empagliflozin] Rash     Yeast infection of the foreskin.        Outpatient Medications:  Current Outpatient Medications   Medication Instructions    albuterol 90 mcg/actuation aerosol powdr breath activated inhaler 2 puffs, Every 6 hours PRN    apixaban (Eliquis) 5 mg tablet 1 tablet, 2 times daily    aspirin 81 mg chewable tablet 1 tablet, Daily    atorvastatin  (LIPITOR) 40 mg, Daily    azithromycin (ZITHROMAX) 500 mg, oral, Daily    budesonide-formoteroL (Symbicort) 160-4.5 mcg/actuation inhaler 2 times daily    fluocinonide (Lidex) 0.05 % ointment USE AS DIRECTED AS NEEDED    guaiFENesin (MUCINEX) 1,200 mg, oral, 2 times daily, Do not crush, chew, or split.    lisinopril 20 mg tablet 1 tablet, 2 times daily    methylPREDNISolone (Medrol Dospak) 4 mg tablets Take as directed on package.    nitroglycerin (Nitrostat) 0.4 mg SL tablet Place 1 tablet under the tongue every 5 minutes as needed for Chest pain. May repeat up to 3 times. Call 911 if pain persists.    omeprazole OTC (PriLOSEC OTC) 20 mg EC tablet 1 tablet, Daily    sotalol (BETAPACE) 120 mg, oral, 2 times daily    spironolactone (Aldactone) 25 mg tablet 1 tablet, Daily    tiotropium (Spiriva Respimat) 2.5 mcg/actuation inhaler 2 puffs, Daily          REVIEW OF SYSTEMS  Review of Systems   All other systems reviewed and are negative.        VITALS  Vitals:    01/06/25 1548   BP: 132/64   Pulse: 64       PHYSICAL EXAM  Constitutional:       Appearance: Healthy appearance. Not in distress.   Eyes:      Conjunctiva/sclera: Conjunctivae normal.      Pupils: Pupils are equal, round, and reactive to light.   Neck:      Vascular: No JVR. JVD normal.   Pulmonary:      Effort: Pulmonary effort is normal.      Breath sounds: Decreased breath sounds present. No wheezing. No rhonchi. No rales.   Chest:      Chest wall: Not tender to palpatation.   Cardiovascular:      PMI at left midclavicular line. Normal rate. Regular rhythm. Normal S1. Normal S2.       Murmurs: There is a grade 2/6 systolic murmur. At base      No gallop.  No click. No rub.   Pulses:     Intact distal pulses.   Edema:     Peripheral edema absent.   Abdominal:      Tenderness: There is no abdominal tenderness.   Musculoskeletal: Normal range of motion.         General: No tenderness.      Cervical back: Normal range of motion. Skin:     General: Skin is warm  and dry.   Neurological:      General: No focal deficit present.      Mental Status: Alert and oriented to person, place and time.           ASSESSMENT AND PLAN  Diagnoses and all orders for this visit:  Paroxysmal atrial fibrillation (Multi)  CAD S/P percutaneous coronary angioplasty  Ascending aorta enlargement (CMS-HCC)  Aortic valve stenosis, etiology of cardiac valve disease unspecified  Mixed hyperlipidemia  Chronic obstructive pulmonary disease, unspecified COPD type (Multi)  Benign essential hypertension  COVID-19  BMI 37.0-37.9, adult  Former cigarette smoker      [unfilled]

## 2025-01-08 ENCOUNTER — PATIENT OUTREACH (OUTPATIENT)
Dept: CARDIOLOGY | Facility: CLINIC | Age: 75
End: 2025-01-08
Payer: MEDICARE

## 2025-01-08 LAB
ATRIAL RATE: 56 BPM
P AXIS: 83 DEGREES
P OFFSET: 177 MS
P ONSET: 146 MS
PR INTERVAL: 162 MS
Q ONSET: 227 MS
QRS COUNT: 9 BEATS
QRS DURATION: 82 MS
QT INTERVAL: 478 MS
QTC CALCULATION(BAZETT): 461 MS
QTC FREDERICIA: 467 MS
R AXIS: -23 DEGREES
T AXIS: 59 DEGREES
T OFFSET: 466 MS
VENTRICULAR RATE: 56 BPM

## 2025-01-08 NOTE — PROGRESS NOTES
Call regarding appt. with Cardiology on 1/6/25 after hospitalization.  At time of outreach call the patient feels as if their condition has improved since last visit.  Reviewed the PCP appointment with the pt and addressed any questions or concerns. Patient has follow up with Dr Simpson

## 2025-01-09 ENCOUNTER — OFFICE VISIT (OUTPATIENT)
Dept: CARDIOLOGY | Facility: CLINIC | Age: 75
End: 2025-01-09
Payer: MEDICARE

## 2025-01-09 VITALS
WEIGHT: 254 LBS | SYSTOLIC BLOOD PRESSURE: 92 MMHG | DIASTOLIC BLOOD PRESSURE: 58 MMHG | HEIGHT: 70 IN | BODY MASS INDEX: 36.36 KG/M2 | HEART RATE: 69 BPM

## 2025-01-09 DIAGNOSIS — I10 BENIGN ESSENTIAL HYPERTENSION: ICD-10-CM

## 2025-01-09 DIAGNOSIS — Z79.01 ANTICOAGULATION MANAGEMENT ENCOUNTER: ICD-10-CM

## 2025-01-09 DIAGNOSIS — R94.31 ABNORMAL EKG: ICD-10-CM

## 2025-01-09 DIAGNOSIS — Z51.81 ANTICOAGULATION MANAGEMENT ENCOUNTER: ICD-10-CM

## 2025-01-09 DIAGNOSIS — Z79.899 HIGH RISK MEDICATION USE: ICD-10-CM

## 2025-01-09 DIAGNOSIS — J44.9 CHRONIC OBSTRUCTIVE PULMONARY DISEASE, UNSPECIFIED COPD TYPE (MULTI): ICD-10-CM

## 2025-01-09 DIAGNOSIS — I48.0 PAROXYSMAL ATRIAL FIBRILLATION (MULTI): Primary | ICD-10-CM

## 2025-01-09 DIAGNOSIS — J44.9 CHRONIC OBSTRUCTIVE PULMONARY DISEASE, UNSPECIFIED COPD TYPE (MULTI): Primary | ICD-10-CM

## 2025-01-09 DIAGNOSIS — Z87.891 FORMER CIGARETTE SMOKER: ICD-10-CM

## 2025-01-09 LAB
ATRIAL RATE: 56 BPM
P AXIS: 18 DEGREES
P OFFSET: 199 MS
P ONSET: 137 MS
PR INTERVAL: 180 MS
Q ONSET: 227 MS
QRS COUNT: 9 BEATS
QRS DURATION: 88 MS
QT INTERVAL: 500 MS
QTC CALCULATION(BAZETT): 482 MS
QTC FREDERICIA: 489 MS
R AXIS: -22 DEGREES
T AXIS: 55 DEGREES
T OFFSET: 477 MS
VENTRICULAR RATE: 56 BPM

## 2025-01-09 ASSESSMENT — ENCOUNTER SYMPTOMS: PALPITATIONS: 1

## 2025-01-09 NOTE — PROGRESS NOTES
CARDIOLOGY OFFICE VISIT      CHIEF COMPLAINT  Chief Complaint   Patient presents with    Follow-up     Pt is here today following up to discuss a-fib       HISTORY OF PRESENT ILLNESS  HPI    74-year-old male with a past medical history of coronary artery disease status post PCI of the mid LAD and angioplasty of the first diagonal in April 2017, normal left ventricular function, mild ascending aortic enlargement, moderate aortic stenosis, hyperlipidemia, obesity, COPD, hypertension and COVID ascending infection in December 2024.  Patient states that lately he has been noticing more episodes of palpitations.  Looking at his records he used to see the electrophysiology service at [facility] until 2019.  He lost completely follow-up with us.  He was placed on sotalol at that time due to evidence of atrial fibrillation diagnosed back in 2016.    Patient again lately still complaining of more palpitations.  Primary cardiology service ordered a Holter monitor that shows initial rhythm on Holter monitor was atrial fibrillation with rates controlled.  This arrhythmia stay for approximately 5 hours after initiation of the study and after that patient converted back to sinus rhythm.  No recurrence of this arrhythmia during the rest of the study.    EKG performed today shows sinus rhythm at a rate of 86 bpm QRS duration 88 ms QT corrected 469 ms.  Rhythm strip shows the same pattern.    Patient states also that he has been noticing some episodes of hypotension at home.        Echocardiogram December 30, 2024     CONCLUSIONS:   1. The left ventricular systolic function is normal, with a visually estimated ejection fraction of 60%.   2. No regional wall motion abnormalities.   3. There is normal right ventricular global systolic function.   4. There is no evidence of mitral valve stenosis.   5. Mild mitral valve regurgitation.   6. Mild tricuspid regurgitation is visualized.   7. Moderate aortic valve  stenosis.    Cardiac catheterization March 2022     CONCLUSIONS:   1. The left ventricular systolic function is normal, with a visually estimated ejection fraction of 60%.   2. No regional wall motion abnormalities.   3. There is normal right ventricular global systolic function.   4. There is no evidence of mitral valve stenosis.   5. Mild mitral valve regurgitation.   6. Mild tricuspid regurgitation is visualized.   7. Moderate aortic valve stenosis.    Past Medical History  Past Medical History:   Diagnosis Date    Arcus senilis, bilateral 07/17/2018    Corneal arcus senilis, bilateral    Arcus senilis, bilateral 07/17/2018    Corneal arcus senilis, bilateral    Atrial fibrillation (Multi)     Combined forms of age-related cataract, bilateral 07/17/2018    Combined form of age-related cataract, both eyes    COPD (chronic obstructive pulmonary disease) (Multi)     Coronary artery disease     Dry eye syndrome of unspecified lacrimal gland 07/17/2018    Dry eye syndrome    Dry eye syndrome of unspecified lacrimal gland 07/17/2018    Dry eye syndrome    Hyperlipidemia     Hypertension     Personal history of other diseases of the circulatory system 04/20/2016    History of atrial fibrillation    Personal history of other diseases of the musculoskeletal system and connective tissue     History of osteoarthritis    Personal history of other diseases of the nervous system and sense organs 04/20/2016    History of cataract    Presence of intraocular lens 07/17/2018    Pseudophakia of right eye    Presence of intraocular lens 07/17/2018    Pseudophakia of right eye    Strain of muscle(s) and tendon(s) of the rotator cuff of left shoulder, initial encounter 10/22/2019    Traumatic complete tear of left rotator cuff, initial encounter    Strain of muscle(s) and tendon(s) of the rotator cuff of left shoulder, subsequent encounter 01/14/2020    Traumatic complete tear of left rotator cuff, subsequent encounter    Unspecified  cataract 2016    Cataract of right eye       Social History  Social History     Tobacco Use    Smoking status: Former     Current packs/day: 0.00     Types: Cigarettes     Quit date:      Years since quittin.0     Passive exposure: Never    Smokeless tobacco: Never   Substance Use Topics    Alcohol use: Not Currently     Comment: quit in     Drug use: Never       Family History     Family History   Problem Relation Name Age of Onset    Hypertension Mother      Atrial fibrillation Mother      Cataracts Mother      Glaucoma Mother      Heart attack Father      Cancer Father          Allergies:  Allergies   Allergen Reactions    Jardiance [Empagliflozin] Rash     Yeast infection of the foreskin.        Outpatient Medications:  Current Outpatient Medications   Medication Instructions    albuterol 90 mcg/actuation aerosol powdr breath activated inhaler 2 puffs, Every 6 hours PRN    apixaban (Eliquis) 5 mg tablet 1 tablet, 2 times daily    aspirin 81 mg chewable tablet 1 tablet, Daily    atorvastatin (LIPITOR) 40 mg, Daily    budesonide-formoteroL (Symbicort) 160-4.5 mcg/actuation inhaler 2 times daily    fluocinonide (Lidex) 0.05 % ointment USE AS DIRECTED AS NEEDED    guaiFENesin (MUCINEX) 1,200 mg, oral, 2 times daily, Do not crush, chew, or split.    lisinopril 20 mg tablet 1 tablet, 2 times daily    methylPREDNISolone (Medrol Dospak) 4 mg tablets Take as directed on package.    nitroglycerin (Nitrostat) 0.4 mg SL tablet Place 1 tablet under the tongue every 5 minutes as needed for Chest pain. May repeat up to 3 times. Call 911 if pain persists.    omeprazole OTC (PriLOSEC OTC) 20 mg EC tablet 1 tablet, Daily    sotalol (BETAPACE) 120 mg, oral, 2 times daily    spironolactone (Aldactone) 25 mg tablet 1 tablet, Daily    tiotropium (Spiriva Respimat) 2.5 mcg/actuation inhaler 2 puffs, Daily          REVIEW OF SYSTEMS  Review of Systems   Cardiovascular:  Positive for palpitations.   All other  systems reviewed and are negative.        VITALS  Vitals:    01/09/25 1318   BP: 90/60   Pulse: 69       PHYSICAL EXAM  Constitutional:       Appearance: Healthy appearance. Not in distress.   Eyes:      Conjunctiva/sclera: Conjunctivae normal.      Pupils: Pupils are equal, round, and reactive to light.   Neck:      Vascular: No JVR. JVD normal.   Pulmonary:      Effort: Pulmonary effort is normal.      Breath sounds: Normal breath sounds. No wheezing. No rhonchi. No rales.   Chest:      Chest wall: Not tender to palpatation.   Cardiovascular:      PMI at left midclavicular line. Normal rate. Regular rhythm. Normal S1. Normal S2.       Murmurs: There is no murmur.      No gallop.  No click. No rub.   Pulses:     Intact distal pulses.   Edema:     Peripheral edema absent.   Abdominal:      Tenderness: There is no abdominal tenderness.   Musculoskeletal: Normal range of motion.         General: No tenderness.      Cervical back: Normal range of motion. Skin:     General: Skin is warm and dry.   Neurological:      General: No focal deficit present.      Mental Status: Alert and oriented to person, place and time.           ASSESSMENT AND PLAN  Clinical impression    1.  Palpitations  2.  Persisting atrial fibrillation  3.  High risk medication (sotalol)  4.  Normal left ventricular function per echogram described above  5.  Coronary artery disease with percutaneous coronary interventions in the past as described above  6.  Hypertension  7.  Hyperlipidemia  8.  Mild to moderate aortic stenosis    Plan recommendations    I had a lengthy discussion with patient regarding management for atrial fibrillation and also hypotension.  Regarding episodes of hypotension at home, he will check his blood pressure twice a day at home.  He will be reevaluated my office in next 4 to 6 weeks.  We may need to adjust medical therapy for hypertension.    Regarding atrial fibrillation he states that his symptoms are not too bad.  Will  continue with observation.    Continue with current dose of sotalol therapy.    Continue with anticoagulant therapy.    If he has recurrence of symptoms of atrial fibrillation, we will offer him adjustment of the dose of sotalol therapy.    Risk factor modification and lifestyle modification discussed with patient. Diet , exercise and hydration discussed with patient.    I have personally review with patient during this office visit, laboratory data, echocardiogram results, stress test results, Holter-event monitor results prior and after the last electrophysiology visit. All questions has been answered.    Please excuse any errors in grammar or translation related to this dictation.  Voice recognition software was utilized to prepare this document.

## 2025-01-09 NOTE — PATIENT INSTRUCTIONS
Follow up in 4-6 weeks  Monitor and record your B/P and bring readings to next visit  Continue same medications/treatment.  Patient educated on proper medication use.  Patient educated on risk factor modification.  Please bring any lab results from other providers / physicians to your next appointment.    Please bring all medicines, vitamins and herbal supplements with you when you come to the office.    Prescriptions will not be filled unless you are compliant with your follow up appointments or have a follow up  appointment scheduled as per instruction of your physician.  Refills should be requested at the time of  Your visit.    Jillian PRESTON LPN, am scribing for and in the presence of Dr. Cheng Simpson MD

## 2025-01-14 PROCEDURE — 93227 XTRNL ECG REC<48 HR R&I: CPT | Performed by: INTERNAL MEDICINE

## 2025-01-15 ENCOUNTER — TELEPHONE (OUTPATIENT)
Dept: CARDIOLOGY | Facility: CLINIC | Age: 75
End: 2025-01-15
Payer: MEDICARE

## 2025-01-15 NOTE — TELEPHONE ENCOUNTER
----- Message from Law Luo sent at 1/14/2025  3:49 PM EST -----  Send to Dr. Simpson for his possibly putting a pacemaker in for sinus node dysfunction along with his atrial fibrillation.  He does have episodes of marked sinus bradycardia in the low 30s per minute.  ----- Message -----  From: Law Luo MD  Sent: 1/14/2025   3:38 PM EST  To: Law Luo MD

## 2025-01-16 NOTE — TELEPHONE ENCOUNTER
Called patient. He is symptomatic with the bradycardia. Will route to scheduling to make a sooner appointment with Dr. Simpson.

## 2025-02-07 ENCOUNTER — PATIENT OUTREACH (OUTPATIENT)
Dept: CARDIOLOGY | Facility: CLINIC | Age: 75
End: 2025-02-07

## 2025-02-07 ENCOUNTER — APPOINTMENT (OUTPATIENT)
Dept: CARDIOLOGY | Facility: CLINIC | Age: 75
End: 2025-02-07
Payer: MEDICARE

## 2025-02-07 VITALS
HEART RATE: 56 BPM | SYSTOLIC BLOOD PRESSURE: 132 MMHG | WEIGHT: 259 LBS | HEIGHT: 70 IN | BODY MASS INDEX: 37.08 KG/M2 | DIASTOLIC BLOOD PRESSURE: 72 MMHG

## 2025-02-07 DIAGNOSIS — I48.0 PAROXYSMAL ATRIAL FIBRILLATION (MULTI): Primary | ICD-10-CM

## 2025-02-07 DIAGNOSIS — Z87.891 FORMER CIGARETTE SMOKER: ICD-10-CM

## 2025-02-07 DIAGNOSIS — Z79.01 ANTICOAGULATION MANAGEMENT ENCOUNTER: ICD-10-CM

## 2025-02-07 DIAGNOSIS — Z51.81 ANTICOAGULATION MANAGEMENT ENCOUNTER: ICD-10-CM

## 2025-02-07 DIAGNOSIS — R94.31 ABNORMAL EKG: ICD-10-CM

## 2025-02-07 DIAGNOSIS — I10 BENIGN ESSENTIAL HYPERTENSION: ICD-10-CM

## 2025-02-07 DIAGNOSIS — Z79.899 HIGH RISK MEDICATION USE: ICD-10-CM

## 2025-02-07 PROCEDURE — 99214 OFFICE O/P EST MOD 30 MIN: CPT | Performed by: INTERNAL MEDICINE

## 2025-02-07 PROCEDURE — 1036F TOBACCO NON-USER: CPT | Performed by: INTERNAL MEDICINE

## 2025-02-07 PROCEDURE — 4010F ACE/ARB THERAPY RXD/TAKEN: CPT | Performed by: INTERNAL MEDICINE

## 2025-02-07 PROCEDURE — 1159F MED LIST DOCD IN RCRD: CPT | Performed by: INTERNAL MEDICINE

## 2025-02-07 PROCEDURE — 93000 ELECTROCARDIOGRAM COMPLETE: CPT | Performed by: INTERNAL MEDICINE

## 2025-02-07 PROCEDURE — 3078F DIAST BP <80 MM HG: CPT | Performed by: INTERNAL MEDICINE

## 2025-02-07 PROCEDURE — 1123F ACP DISCUSS/DSCN MKR DOCD: CPT | Performed by: INTERNAL MEDICINE

## 2025-02-07 PROCEDURE — 3008F BODY MASS INDEX DOCD: CPT | Performed by: INTERNAL MEDICINE

## 2025-02-07 PROCEDURE — 3075F SYST BP GE 130 - 139MM HG: CPT | Performed by: INTERNAL MEDICINE

## 2025-02-07 NOTE — PROGRESS NOTES
Successful outreach to patient regarding hospitalization as patient continues TCM program.   At time of outreach call the patient feels as if their condition has improved since initial visit with PCP or specialist.  Questions or concerns addressed at this time with patient.   Provided contact information to patient if any further non-emergent needs arise.  Patient seen EP today

## 2025-02-07 NOTE — PATIENT INSTRUCTIONS
Continue same medications and treatments.   Patient educated on proper medication use.   Patient educated on risk factor modification.   Please bring any lab results from other providers / physicians to your next appointment.     Please bring all medicines, vitamins, and herbal supplements with you when you come to the office.     Prescriptions will not be filled unless you are compliant with your follow up appointments or have a follow up appointment scheduled as per instruction of your physician. Refills should be requested at the time of your visit.    FOLLOW UP IN 6 MONTHS    ILayla LPN, am scribing for and in the presence of Dr. Cheng Simpson MD

## 2025-02-07 NOTE — PROGRESS NOTES
CARDIOLOGY OFFICE VISIT      CHIEF COMPLAINT  Chief Complaint   Patient presents with    Follow-up     4 to 6 weeks        HISTORY OF PRESENT ILLNESS  HPI  74-year-old male with a past medical history of coronary artery disease status post PCI of the mid LAD and angioplasty of the first diagonal in April 2017, normal left ventricular function, mild ascending aortic enlargement, moderate aortic stenosis, hyperlipidemia, obesity, COPD, hypertension and COVID ascending infection in December 2024.  Patient states that lately he has been noticing more episodes of palpitations.  Looking at his records he used to see the electrophysiology service at [facility] until 2019.  He lost completely follow-up with us.  He was placed on sotalol at that time due to evidence of atrial fibrillation diagnosed back in 2016.     Patient again lately still complaining of more palpitations.  Primary cardiology service ordered a Holter monitor that shows initial rhythm on Holter monitor was atrial fibrillation with rates controlled.  This arrhythmia stay for approximately 5 hours after initiation of the study and after that patient converted back to sinus rhythm.  No recurrence of this arrhythmia during the rest of the study.      Echocardiogram December 30, 2024     CONCLUSIONS:   1. The left ventricular systolic function is normal, with a visually estimated ejection fraction of 60%.   2. No regional wall motion abnormalities.   3. There is normal right ventricular global systolic function.   4. There is no evidence of mitral valve stenosis.   5. Mild mitral valve regurgitation.   6. Mild tricuspid regurgitation is visualized.   7. Moderate aortic valve stenosis.     Cardiac catheterization March 2022     CONCLUSIONS:   1. The left ventricular systolic function is normal, with a visually estimated ejection fraction of 60%.   2. No regional wall motion abnormalities.   3. There is normal right ventricular global systolic  function.   4. There is no evidence of mitral valve stenosis.   5. Mild mitral valve regurgitation.   6. Mild tricuspid regurgitation is visualized.   7. Moderate aortic valve stenosis.      Hydrochlorothiazide was discontinued.  The dose of lisinopril was cut to 10 mg 1 tablet twice a day.  He states that his blood pressures are much better controlled.  He brought his blood pressure readings today and they are between 120-140 mmHg systolic.    Patient is doing well.  Denies any symptoms of chest pain or shortness breath or palpitations.    EKG performed today shows sinus bradycardia rate of 56 bpm QRS duration 94 ms QT corrected 465 ms.  Rhythm strip shows the same pattern.         Past Medical History  Past Medical History:   Diagnosis Date    Arcus senilis, bilateral 07/17/2018    Corneal arcus senilis, bilateral    Arcus senilis, bilateral 07/17/2018    Corneal arcus senilis, bilateral    Atrial fibrillation (Multi)     Combined forms of age-related cataract, bilateral 07/17/2018    Combined form of age-related cataract, both eyes    COPD (chronic obstructive pulmonary disease) (Multi)     Coronary artery disease     Dry eye syndrome of unspecified lacrimal gland 07/17/2018    Dry eye syndrome    Dry eye syndrome of unspecified lacrimal gland 07/17/2018    Dry eye syndrome    Hyperlipidemia     Hypertension     Personal history of other diseases of the circulatory system 04/20/2016    History of atrial fibrillation    Personal history of other diseases of the musculoskeletal system and connective tissue     History of osteoarthritis    Personal history of other diseases of the nervous system and sense organs 04/20/2016    History of cataract    Presence of intraocular lens 07/17/2018    Pseudophakia of right eye    Presence of intraocular lens 07/17/2018    Pseudophakia of right eye    Strain of muscle(s) and tendon(s) of the rotator cuff of left shoulder, initial encounter 10/22/2019    Traumatic complete tear  of left rotator cuff, initial encounter    Strain of muscle(s) and tendon(s) of the rotator cuff of left shoulder, subsequent encounter 2020    Traumatic complete tear of left rotator cuff, subsequent encounter    Unspecified cataract 2016    Cataract of right eye       Social History  Social History     Tobacco Use    Smoking status: Former     Current packs/day: 0.00     Types: Cigarettes     Quit date:      Years since quittin.1     Passive exposure: Never    Smokeless tobacco: Never   Substance Use Topics    Alcohol use: Not Currently     Comment: quit in     Drug use: Never       Family History     Family History   Problem Relation Name Age of Onset    Hypertension Mother      Atrial fibrillation Mother      Cataracts Mother      Glaucoma Mother      Heart attack Father      Cancer Father          Allergies:  Allergies   Allergen Reactions    Jardiance [Empagliflozin] Rash     Yeast infection of the foreskin.        Outpatient Medications:  Current Outpatient Medications   Medication Instructions    albuterol 90 mcg/actuation aerosol powdr breath activated inhaler 2 puffs, Every 6 hours PRN    apixaban (Eliquis) 5 mg tablet 1 tablet, 2 times daily    aspirin 81 mg chewable tablet 1 tablet, Daily    atorvastatin (LIPITOR) 40 mg, Daily    budesonide-formoteroL (Symbicort) 160-4.5 mcg/actuation inhaler 2 times daily    fluocinonide (Lidex) 0.05 % ointment USE AS DIRECTED AS NEEDED    lisinopril 20 mg tablet 1 tablet, 2 times daily    nitroglycerin (Nitrostat) 0.4 mg SL tablet Place 1 tablet under the tongue every 5 minutes as needed for Chest pain. May repeat up to 3 times. Call 911 if pain persists.    omeprazole OTC (PriLOSEC OTC) 20 mg EC tablet 1 tablet, Daily    sotalol (BETAPACE) 120 mg, oral, 2 times daily    spironolactone (Aldactone) 25 mg tablet 1 tablet, Daily    tiotropium (Spiriva Respimat) 2.5 mcg/actuation inhaler 2 puffs, Daily          REVIEW OF SYSTEMS  Review of  Systems   All other systems reviewed and are negative.        VITALS  Vitals:    02/07/25 0931   BP: 132/72   Pulse: 56       PHYSICAL EXAM  Constitutional:       Appearance: Healthy appearance. Not in distress.   Neck:      Vascular: No JVR. JVD normal.   Pulmonary:      Effort: Pulmonary effort is normal.      Breath sounds: Normal breath sounds. No wheezing. No rhonchi. No rales.   Chest:      Chest wall: Not tender to palpatation.   Cardiovascular:      PMI at left midclavicular line. Normal rate. Regular rhythm. Normal S1. Normal S2.       Murmurs: There is no murmur.      No gallop.  No click. No rub.   Pulses:     Intact distal pulses.   Edema:     Peripheral edema absent.   Abdominal:      General: Bowel sounds are normal.      Palpations: Abdomen is soft.      Tenderness: There is no abdominal tenderness.   Musculoskeletal: Normal range of motion.         General: No tenderness. Skin:     General: Skin is warm and dry.   Neurological:      General: No focal deficit present.      Mental Status: Alert and oriented to person, place and time.           ASSESSMENT AND PLAN  Clinical impression     1.  Palpitations  2.  Persisting atrial fibrillation  3.  High risk medication (sotalol)  4.  Normal left ventricular function per echogram described above  5.  Coronary artery disease with percutaneous coronary interventions in the past as described above  6.  Hypertension  7.  Hyperlipidemia  8.  Mild to moderate aortic stenosis    Plan recommendation    Patient is doing well from the electrophysiologist on point.  No recurrence of atrial fibrillation.  Continue with sotalol (high risk medication).    Continue with Eliquis therapy.    Regarding hypertension, his blood pressures are much better controlled on current medical therapy.  Will continue with lisinopril 10 mg 1 tablet twice a day.    Follow my office every 6 months or sooner if needed.

## 2025-02-21 ENCOUNTER — APPOINTMENT (OUTPATIENT)
Dept: CARDIOLOGY | Facility: CLINIC | Age: 75
End: 2025-02-21
Payer: MEDICARE

## 2025-03-05 ENCOUNTER — APPOINTMENT (OUTPATIENT)
Dept: CARDIOLOGY | Facility: CLINIC | Age: 75
End: 2025-03-05
Payer: MEDICARE

## 2025-03-07 ENCOUNTER — PATIENT OUTREACH (OUTPATIENT)
Dept: CARDIOLOGY | Facility: CLINIC | Age: 75
End: 2025-03-07
Payer: MEDICARE

## 2025-03-12 ENCOUNTER — OFFICE VISIT (OUTPATIENT)
Dept: PRIMARY CARE | Facility: CLINIC | Age: 75
End: 2025-03-12
Payer: MEDICARE

## 2025-03-12 VITALS
HEART RATE: 64 BPM | RESPIRATION RATE: 16 BRPM | BODY MASS INDEX: 37.65 KG/M2 | HEIGHT: 70 IN | SYSTOLIC BLOOD PRESSURE: 132 MMHG | WEIGHT: 263 LBS | OXYGEN SATURATION: 97 % | DIASTOLIC BLOOD PRESSURE: 86 MMHG | TEMPERATURE: 97.2 F

## 2025-03-12 DIAGNOSIS — J96.11 CHRONIC RESPIRATORY FAILURE WITH HYPOXIA (MULTI): ICD-10-CM

## 2025-03-12 DIAGNOSIS — I10 BENIGN ESSENTIAL HYPERTENSION: ICD-10-CM

## 2025-03-12 DIAGNOSIS — N52.01 ERECTILE DYSFUNCTION DUE TO ARTERIAL INSUFFICIENCY: Primary | ICD-10-CM

## 2025-03-12 DIAGNOSIS — E11.9 CONTROLLED TYPE 2 DIABETES MELLITUS WITHOUT COMPLICATION, WITHOUT LONG-TERM CURRENT USE OF INSULIN (MULTI): ICD-10-CM

## 2025-03-12 DIAGNOSIS — B35.3 TINEA PEDIS OF BOTH FEET: Primary | ICD-10-CM

## 2025-03-12 DIAGNOSIS — E78.2 MIXED HYPERLIPIDEMIA: ICD-10-CM

## 2025-03-12 DIAGNOSIS — I27.20 PULMONARY HYPERTENSION (MULTI): ICD-10-CM

## 2025-03-12 DIAGNOSIS — M10.9 GOUT, UNSPECIFIED CAUSE, UNSPECIFIED CHRONICITY, UNSPECIFIED SITE: ICD-10-CM

## 2025-03-12 PROBLEM — J44.1 COPD WITH ACUTE EXACERBATION (MULTI): Status: RESOLVED | Noted: 2024-12-31 | Resolved: 2025-03-12

## 2025-03-12 PROBLEM — E66.01 CLASS 3 SEVERE OBESITY DUE TO EXCESS CALORIES WITH SERIOUS COMORBIDITY AND BODY MASS INDEX (BMI) OF 40.0 TO 44.9 IN ADULT: Status: RESOLVED | Noted: 2024-02-08 | Resolved: 2025-03-12

## 2025-03-12 PROBLEM — J44.9 CHRONIC OBSTRUCTIVE PULMONARY DISEASE (MULTI): Status: RESOLVED | Noted: 2023-03-03 | Resolved: 2025-03-12

## 2025-03-12 PROBLEM — E66.813 CLASS 3 SEVERE OBESITY DUE TO EXCESS CALORIES WITH SERIOUS COMORBIDITY AND BODY MASS INDEX (BMI) OF 40.0 TO 44.9 IN ADULT: Status: RESOLVED | Noted: 2024-02-08 | Resolved: 2025-03-12

## 2025-03-12 PROBLEM — D76.3: Status: RESOLVED | Noted: 2023-03-03 | Resolved: 2025-03-12

## 2025-03-12 PROCEDURE — G2211 COMPLEX E/M VISIT ADD ON: HCPCS | Performed by: FAMILY MEDICINE

## 2025-03-12 PROCEDURE — 99213 OFFICE O/P EST LOW 20 MIN: CPT | Performed by: FAMILY MEDICINE

## 2025-03-12 PROCEDURE — 1123F ACP DISCUSS/DSCN MKR DOCD: CPT | Performed by: FAMILY MEDICINE

## 2025-03-12 PROCEDURE — 3079F DIAST BP 80-89 MM HG: CPT | Performed by: FAMILY MEDICINE

## 2025-03-12 PROCEDURE — 4010F ACE/ARB THERAPY RXD/TAKEN: CPT | Performed by: FAMILY MEDICINE

## 2025-03-12 PROCEDURE — 3008F BODY MASS INDEX DOCD: CPT | Performed by: FAMILY MEDICINE

## 2025-03-12 PROCEDURE — 3075F SYST BP GE 130 - 139MM HG: CPT | Performed by: FAMILY MEDICINE

## 2025-03-12 PROCEDURE — 1036F TOBACCO NON-USER: CPT | Performed by: FAMILY MEDICINE

## 2025-03-12 PROCEDURE — 1159F MED LIST DOCD IN RCRD: CPT | Performed by: FAMILY MEDICINE

## 2025-03-12 RX ORDER — COLCHICINE 0.6 MG/1
TABLET ORAL
Qty: 60 TABLET | Refills: 1 | Status: SHIPPED | OUTPATIENT
Start: 2025-03-12

## 2025-03-12 RX ORDER — TADALAFIL 20 MG/1
20 TABLET ORAL DAILY PRN
Qty: 12 TABLET | Refills: 3 | Status: SHIPPED | OUTPATIENT
Start: 2025-03-12 | End: 2026-03-12

## 2025-03-12 RX ORDER — ECONAZOLE NITRATE 10 MG/G
CREAM TOPICAL 2 TIMES DAILY PRN
Qty: 85 G | Refills: 1 | Status: SHIPPED | OUTPATIENT
Start: 2025-03-12 | End: 2025-03-12

## 2025-03-12 RX ORDER — ECONAZOLE NITRATE 10 MG/G
CREAM TOPICAL 2 TIMES DAILY PRN
Qty: 85 G | Refills: 1 | Status: SHIPPED | OUTPATIENT
Start: 2025-03-12 | End: 2025-07-10

## 2025-03-12 ASSESSMENT — ENCOUNTER SYMPTOMS
LOSS OF SENSATION IN FEET: 0
OCCASIONAL FEELINGS OF UNSTEADINESS: 0
DEPRESSION: 0

## 2025-03-12 ASSESSMENT — PATIENT HEALTH QUESTIONNAIRE - PHQ9
1. LITTLE INTEREST OR PLEASURE IN DOING THINGS: NOT AT ALL
SUM OF ALL RESPONSES TO PHQ9 QUESTIONS 1 & 2: 0
2. FEELING DOWN, DEPRESSED OR HOPELESS: NOT AT ALL

## 2025-03-12 ASSESSMENT — ANXIETY QUESTIONNAIRES
7. FEELING AFRAID AS IF SOMETHING AWFUL MIGHT HAPPEN: NOT AT ALL
1. FEELING NERVOUS, ANXIOUS, OR ON EDGE: NOT AT ALL
3. WORRYING TOO MUCH ABOUT DIFFERENT THINGS: NOT AT ALL
5. BEING SO RESTLESS THAT IT IS HARD TO SIT STILL: NOT AT ALL
GAD7 TOTAL SCORE: 0
6. BECOMING EASILY ANNOYED OR IRRITABLE: NOT AT ALL
IF YOU CHECKED OFF ANY PROBLEMS ON THIS QUESTIONNAIRE, HOW DIFFICULT HAVE THESE PROBLEMS MADE IT FOR YOU TO DO YOUR WORK, TAKE CARE OF THINGS AT HOME, OR GET ALONG WITH OTHER PEOPLE: NOT DIFFICULT AT ALL
4. TROUBLE RELAXING: NOT AT ALL
2. NOT BEING ABLE TO STOP OR CONTROL WORRYING: NOT AT ALL

## 2025-03-12 NOTE — TELEPHONE ENCOUNTER
Rx Refill Request Telephone Encounter    Name:  Elroy Edmonds  :  [id number]  Medication Name:  tadalafil (Cialis) 20 mg tablet     Specific Pharmacy location:  [facility]  Date of last appointment:  3/12/25  Date of next appointment:  25  Best number to reach patient:  [phone]

## 2025-03-12 NOTE — PROGRESS NOTES
Subjective   Patient ID: Elroy Edmonds is a 74 y.o. male who presents for Rash on Feet . I last saw the patient on 8/27/2024  .     HPI  Onset - 60 years ago comes and goes. Patient states that it is not as back in the winter. Patient states that he has had this since [place]  Patient admits to - feet sweating, pain, cracked skin and blisters  Patient denies -swelling  OTC/Rx - fluocinonide with minimal relief    [facility].     Past medical, surgical, and family history reviewed.  Reviewed and documented all medications   Pt eating well, exercising as tolerated and taking medications as directed.      Review of Systems  Except positives as noted in the CC & HPI      Constitutional: Denies fevers, chills, night sweats, fatigue, weight changes, change in appetite    Eyes: Denies blurry vision, double vision    ENT: Denies otalgia, trouble hearing, tinnitus, vertigo, nasal congestion, rhinorrhea, sore throat    Neck: Denies swelling, masses    Cardiovascular: Denies chest pain, palpitations, edema, orthopnea, syncope    Respiratory: Denies dyspnea, cough, wheezing, postural nocturnal dyspnea    Gastrointestinal: Denies abdominal pain, nausea, vomiting, diarrhea, constipation, melena, hematochezia    Genitourinary: Denies dysuria, hematuria  Musculoskeletal: Denies back pain, neck pain, arthralgias, myalgias    Integumentary: Denies skin lesions, rashes, masses    Neurological: Denies dizziness, headaches, confusion, limb weakness, paresthesias, syncope, convulsions    Psychiatric: Denies depression, anxiety, homicidal ideations, suicidal ideations, sleep disturbances    Endocrine: Denies polyphagia, polydipsia, polyuria, weakness, hair thinning, heat intolerance, cold intolerance, weight changes    Heme/Lymph: Denies easy bruising, easy bleeding, swollen glands    Objective   Vitals:    03/12/25 1447   BP: 132/86   Pulse: 64   Resp: 16   Temp: 36.2  "°C (97.2 °F)   SpO2: 97%   Weight: 119 kg (263 lb)   Height: 1.778 m (5' 10\")        Physical Exam  General Appearance - well-developed, well-nourished, 73 y.o., White male in no acute distress.      Skin - warm, pink and dry without rash or concerning lesions.      Mental Status - alert and oriented x 3. Normal mood and affect appropriate to mood.       Extremities - no cyanosis, clubbing. Trace edema above the sock line, bilaterally. Pedal pulses are 2+ normal at the dorsalis pedis and posterior pulses bilaterally.     Bilateral Feet- Erythematous scaly rash in a moccasin distribution about the plantar aspects of the feet and involving the web spaces between the toes.        Assessment   1. Tinea pedis of both feet  econazole nitrate 1 % cream    DISCONTINUED: econazole nitrate 1 % cream      2. Gout, unspecified cause, unspecified chronicity, unspecified site  colchicine 0.6 mg tablet      3. Benign essential hypertension        4. Controlled type 2 diabetes mellitus without complication, without long-term current use of insulin (Multi)        5. Mixed hyperlipidemia          Patient will continue on a diabetic, low-cholesterol diet and weight reduction. Exercise as tolerated. He will continue medications as prescribed. Follow-up on 06/12/2025 as scheduled, otherwise as needed.     Patient was given refill(s) on:   Colchicine  mg, TAKE 2 TABS THEN 1 TAB ONE HOUR LATER, THEN CONTINUE TAKING 1 TAB TWICE DAILY.    Rx(s) sent to pharmacy.      Patient was started on:   Econazole Nitrate 1% Cream, APPLY AND GENTLY MASSAGE INTO AFFECTED AREA(S) TWICE DAILY FOR TWO WEEKS.    Rx(s) sent to pharmacy.      Patient is to follow up with Dr. Luo, [facility].      Scribe Attestation  By signing my name below, Anahy PRESTON , Ke   attest that this documentation has been prepared under the direction and in the presence of Ben Diaz MD.     All medical record entries made by " the scribe were at my direction and personally dictated by me. I have reviewed the chart and agree that the record accurately reflects my personal performance of the history, physical exam, discussion, and plan.    Ben Diaz M.D.

## 2025-03-13 NOTE — ASSESSMENT & PLAN NOTE
Pulmonary hypertension is stable.  Patient to continue with current medications and treatment plan.  Follow-up at least annually.

## 2025-03-13 NOTE — ASSESSMENT & PLAN NOTE
Chronic respiratory failure with hypoxia is stable.  Patient to continue with current medications and treatment plan.  Follow-up at least annually.

## 2025-03-25 ENCOUNTER — APPOINTMENT (OUTPATIENT)
Dept: PRIMARY CARE | Facility: CLINIC | Age: 75
End: 2025-03-25
Payer: MEDICARE

## 2025-03-26 ENCOUNTER — TELEPHONE (OUTPATIENT)
Dept: CARDIOLOGY | Facility: CLINIC | Age: 75
End: 2025-03-26
Payer: MEDICARE

## 2025-03-28 RX ORDER — METOPROLOL SUCCINATE 50 MG/1
50 TABLET, EXTENDED RELEASE ORAL DAILY
COMMUNITY

## 2025-03-28 NOTE — TELEPHONE ENCOUNTER
Patient stopped in office.  He is currently taking Metoprolol  succinate 50 mg.  He currently does not need a refill on the 50 mg.  Med list updated.

## 2025-03-28 NOTE — TELEPHONE ENCOUNTER
Per Dr. Law Luo , refill Metoprolol at current dose.  Call placed to patient and left message requesting return call.  Check with patient to see where he would like it sent.

## 2025-06-12 ENCOUNTER — APPOINTMENT (OUTPATIENT)
Dept: PRIMARY CARE | Facility: CLINIC | Age: 75
End: 2025-06-12
Payer: MEDICARE

## 2025-07-01 ENCOUNTER — APPOINTMENT (OUTPATIENT)
Dept: RADIOLOGY | Facility: HOSPITAL | Age: 75
End: 2025-07-01
Payer: MEDICARE

## 2025-07-01 ENCOUNTER — HOSPITAL ENCOUNTER (INPATIENT)
Facility: HOSPITAL | Age: 75
LOS: 1 days | Discharge: HOME | End: 2025-07-03
Attending: STUDENT IN AN ORGANIZED HEALTH CARE EDUCATION/TRAINING PROGRAM | Admitting: STUDENT IN AN ORGANIZED HEALTH CARE EDUCATION/TRAINING PROGRAM
Payer: MEDICARE

## 2025-07-01 ENCOUNTER — TELEPHONE (OUTPATIENT)
Dept: CARDIOLOGY | Facility: CLINIC | Age: 75
End: 2025-07-01
Payer: MEDICARE

## 2025-07-01 ENCOUNTER — APPOINTMENT (OUTPATIENT)
Dept: CARDIOLOGY | Facility: HOSPITAL | Age: 75
End: 2025-07-01
Payer: MEDICARE

## 2025-07-01 DIAGNOSIS — I50.9 ACUTE ON CHRONIC CONGESTIVE HEART FAILURE, UNSPECIFIED HEART FAILURE TYPE: Primary | ICD-10-CM

## 2025-07-01 LAB
ALBUMIN SERPL BCP-MCNC: 4 G/DL (ref 3.4–5)
ALP SERPL-CCNC: 88 U/L (ref 33–136)
ALT SERPL W P-5'-P-CCNC: 22 U/L (ref 10–52)
ANION GAP SERPL CALC-SCNC: 9 MMOL/L (ref 10–20)
AST SERPL W P-5'-P-CCNC: 20 U/L (ref 9–39)
ATRIAL RATE: 52 BPM
BASOPHILS # BLD AUTO: 0.06 X10*3/UL (ref 0–0.1)
BASOPHILS NFR BLD AUTO: 0.6 %
BILIRUB SERPL-MCNC: 1.3 MG/DL (ref 0–1.2)
BNP SERPL-MCNC: 358 PG/ML (ref 0–99)
BUN SERPL-MCNC: 15 MG/DL (ref 6–23)
CALCIUM SERPL-MCNC: 9 MG/DL (ref 8.6–10.3)
CARDIAC TROPONIN I PNL SERPL HS: 9 NG/L (ref 0–20)
CARDIAC TROPONIN I PNL SERPL HS: 9 NG/L (ref 0–20)
CHLORIDE SERPL-SCNC: 105 MMOL/L (ref 98–107)
CO2 SERPL-SCNC: 30 MMOL/L (ref 21–32)
CREAT SERPL-MCNC: 0.86 MG/DL (ref 0.5–1.3)
EGFRCR SERPLBLD CKD-EPI 2021: 90 ML/MIN/1.73M*2
EOSINOPHIL # BLD AUTO: 0.11 X10*3/UL (ref 0–0.4)
EOSINOPHIL NFR BLD AUTO: 1.2 %
ERYTHROCYTE [DISTWIDTH] IN BLOOD BY AUTOMATED COUNT: 15.2 % (ref 11.5–14.5)
FLUAV RNA RESP QL NAA+PROBE: NOT DETECTED
FLUBV RNA RESP QL NAA+PROBE: NOT DETECTED
GLUCOSE SERPL-MCNC: 115 MG/DL (ref 74–99)
HCT VFR BLD AUTO: 43.1 % (ref 41–52)
HGB BLD-MCNC: 13.3 G/DL (ref 13.5–17.5)
IMM GRANULOCYTES # BLD AUTO: 0.02 X10*3/UL (ref 0–0.5)
IMM GRANULOCYTES NFR BLD AUTO: 0.2 % (ref 0–0.9)
LYMPHOCYTES # BLD AUTO: 2.07 X10*3/UL (ref 0.8–3)
LYMPHOCYTES NFR BLD AUTO: 22.2 %
MAGNESIUM SERPL-MCNC: 1.9 MG/DL (ref 1.6–2.4)
MCH RBC QN AUTO: 24.7 PG (ref 26–34)
MCHC RBC AUTO-ENTMCNC: 30.9 G/DL (ref 32–36)
MCV RBC AUTO: 80 FL (ref 80–100)
MONOCYTES # BLD AUTO: 0.96 X10*3/UL (ref 0.05–0.8)
MONOCYTES NFR BLD AUTO: 10.3 %
NEUTROPHILS # BLD AUTO: 6.11 X10*3/UL (ref 1.6–5.5)
NEUTROPHILS NFR BLD AUTO: 65.5 %
NRBC BLD-RTO: 0 /100 WBCS (ref 0–0)
P AXIS: 37 DEGREES
P OFFSET: 201 MS
P ONSET: 140 MS
PLATELET # BLD AUTO: 272 X10*3/UL (ref 150–450)
POTASSIUM SERPL-SCNC: 4.4 MMOL/L (ref 3.5–5.3)
PR INTERVAL: 170 MS
PROT SERPL-MCNC: 7 G/DL (ref 6.4–8.2)
Q ONSET: 225 MS
QRS COUNT: 8 BEATS
QRS DURATION: 94 MS
QT INTERVAL: 490 MS
QTC CALCULATION(BAZETT): 455 MS
QTC FREDERICIA: 467 MS
R AXIS: 0 DEGREES
RBC # BLD AUTO: 5.38 X10*6/UL (ref 4.5–5.9)
SARS-COV-2 RNA RESP QL NAA+PROBE: NOT DETECTED
SODIUM SERPL-SCNC: 140 MMOL/L (ref 136–145)
T AXIS: 63 DEGREES
T OFFSET: 470 MS
VENTRICULAR RATE: 52 BPM
WBC # BLD AUTO: 9.3 X10*3/UL (ref 4.4–11.3)

## 2025-07-01 PROCEDURE — 84075 ASSAY ALKALINE PHOSPHATASE: CPT | Performed by: STUDENT IN AN ORGANIZED HEALTH CARE EDUCATION/TRAINING PROGRAM

## 2025-07-01 PROCEDURE — 83880 ASSAY OF NATRIURETIC PEPTIDE: CPT | Performed by: STUDENT IN AN ORGANIZED HEALTH CARE EDUCATION/TRAINING PROGRAM

## 2025-07-01 PROCEDURE — G0378 HOSPITAL OBSERVATION PER HR: HCPCS

## 2025-07-01 PROCEDURE — 2500000001 HC RX 250 WO HCPCS SELF ADMINISTERED DRUGS (ALT 637 FOR MEDICARE OP)

## 2025-07-01 PROCEDURE — 36415 COLL VENOUS BLD VENIPUNCTURE: CPT | Performed by: STUDENT IN AN ORGANIZED HEALTH CARE EDUCATION/TRAINING PROGRAM

## 2025-07-01 PROCEDURE — 99223 1ST HOSP IP/OBS HIGH 75: CPT

## 2025-07-01 PROCEDURE — 84484 ASSAY OF TROPONIN QUANT: CPT | Performed by: STUDENT IN AN ORGANIZED HEALTH CARE EDUCATION/TRAINING PROGRAM

## 2025-07-01 PROCEDURE — 87636 SARSCOV2 & INF A&B AMP PRB: CPT | Performed by: STUDENT IN AN ORGANIZED HEALTH CARE EDUCATION/TRAINING PROGRAM

## 2025-07-01 PROCEDURE — 83735 ASSAY OF MAGNESIUM: CPT | Performed by: STUDENT IN AN ORGANIZED HEALTH CARE EDUCATION/TRAINING PROGRAM

## 2025-07-01 PROCEDURE — 2500000001 HC RX 250 WO HCPCS SELF ADMINISTERED DRUGS (ALT 637 FOR MEDICARE OP): Performed by: NURSE PRACTITIONER

## 2025-07-01 PROCEDURE — 71045 X-RAY EXAM CHEST 1 VIEW: CPT

## 2025-07-01 PROCEDURE — 71045 X-RAY EXAM CHEST 1 VIEW: CPT | Performed by: RADIOLOGY

## 2025-07-01 PROCEDURE — 93005 ELECTROCARDIOGRAM TRACING: CPT

## 2025-07-01 PROCEDURE — 2500000004 HC RX 250 GENERAL PHARMACY W/ HCPCS (ALT 636 FOR OP/ED): Performed by: STUDENT IN AN ORGANIZED HEALTH CARE EDUCATION/TRAINING PROGRAM

## 2025-07-01 PROCEDURE — 99285 EMERGENCY DEPT VISIT HI MDM: CPT | Performed by: STUDENT IN AN ORGANIZED HEALTH CARE EDUCATION/TRAINING PROGRAM

## 2025-07-01 PROCEDURE — 96374 THER/PROPH/DIAG INJ IV PUSH: CPT

## 2025-07-01 PROCEDURE — 85025 COMPLETE CBC W/AUTO DIFF WBC: CPT | Performed by: STUDENT IN AN ORGANIZED HEALTH CARE EDUCATION/TRAINING PROGRAM

## 2025-07-01 PROCEDURE — 2500000004 HC RX 250 GENERAL PHARMACY W/ HCPCS (ALT 636 FOR OP/ED)

## 2025-07-01 RX ORDER — BUDESONIDE, GLYCOPYRROLATE, AND FORMOTEROL FUMARATE 160; 9; 4.8 UG/1; UG/1; UG/1
2 AEROSOL, METERED RESPIRATORY (INHALATION) 2 TIMES DAILY
COMMUNITY

## 2025-07-01 RX ORDER — ACETAMINOPHEN 650 MG/1
650 SUPPOSITORY RECTAL EVERY 4 HOURS PRN
Status: DISCONTINUED | OUTPATIENT
Start: 2025-07-01 | End: 2025-07-03 | Stop reason: HOSPADM

## 2025-07-01 RX ORDER — ACETAMINOPHEN 325 MG/1
650 TABLET ORAL EVERY 4 HOURS PRN
Status: DISCONTINUED | OUTPATIENT
Start: 2025-07-01 | End: 2025-07-03 | Stop reason: HOSPADM

## 2025-07-01 RX ORDER — SPIRONOLACTONE 25 MG/1
25 TABLET ORAL DAILY
Status: DISCONTINUED | OUTPATIENT
Start: 2025-07-02 | End: 2025-07-03 | Stop reason: HOSPADM

## 2025-07-01 RX ORDER — ONDANSETRON HYDROCHLORIDE 2 MG/ML
4 INJECTION, SOLUTION INTRAVENOUS EVERY 8 HOURS PRN
Status: DISCONTINUED | OUTPATIENT
Start: 2025-07-01 | End: 2025-07-03 | Stop reason: HOSPADM

## 2025-07-01 RX ORDER — NAPROXEN SODIUM 220 MG/1
81 TABLET, FILM COATED ORAL DAILY
Status: DISCONTINUED | OUTPATIENT
Start: 2025-07-02 | End: 2025-07-03 | Stop reason: HOSPADM

## 2025-07-01 RX ORDER — LISINOPRIL 10 MG/1
10 TABLET ORAL DAILY
Status: DISCONTINUED | OUTPATIENT
Start: 2025-07-02 | End: 2025-07-01

## 2025-07-01 RX ORDER — METOPROLOL SUCCINATE 50 MG/1
50 TABLET, EXTENDED RELEASE ORAL NIGHTLY
Status: DISCONTINUED | OUTPATIENT
Start: 2025-07-01 | End: 2025-07-03 | Stop reason: HOSPADM

## 2025-07-01 RX ORDER — ATORVASTATIN CALCIUM 20 MG/1
40 TABLET, FILM COATED ORAL NIGHTLY
Status: DISCONTINUED | OUTPATIENT
Start: 2025-07-01 | End: 2025-07-03 | Stop reason: HOSPADM

## 2025-07-01 RX ORDER — CETIRIZINE HYDROCHLORIDE 10 MG/1
10 TABLET, CHEWABLE ORAL DAILY
COMMUNITY

## 2025-07-01 RX ORDER — POLYETHYLENE GLYCOL 3350 17 G/17G
17 POWDER, FOR SOLUTION ORAL DAILY
Status: DISCONTINUED | OUTPATIENT
Start: 2025-07-01 | End: 2025-07-03 | Stop reason: HOSPADM

## 2025-07-01 RX ORDER — LIDOCAINE 40 MG/G
1 CREAM TOPICAL DAILY PRN
COMMUNITY

## 2025-07-01 RX ORDER — LISINOPRIL 10 MG/1
10 TABLET ORAL 2 TIMES DAILY
Status: DISCONTINUED | OUTPATIENT
Start: 2025-07-01 | End: 2025-07-02

## 2025-07-01 RX ORDER — ACETAMINOPHEN 160 MG/5ML
650 SOLUTION ORAL EVERY 4 HOURS PRN
Status: DISCONTINUED | OUTPATIENT
Start: 2025-07-01 | End: 2025-07-03 | Stop reason: HOSPADM

## 2025-07-01 RX ORDER — SOTALOL HYDROCHLORIDE 120 MG/1
120 TABLET ORAL 2 TIMES DAILY
Status: DISCONTINUED | OUTPATIENT
Start: 2025-07-01 | End: 2025-07-03 | Stop reason: HOSPADM

## 2025-07-01 RX ORDER — FUROSEMIDE 10 MG/ML
40 INJECTION INTRAMUSCULAR; INTRAVENOUS ONCE
Status: COMPLETED | OUTPATIENT
Start: 2025-07-01 | End: 2025-07-01

## 2025-07-01 RX ORDER — ACETAMINOPHEN 500 MG
10 TABLET ORAL NIGHTLY PRN
Status: DISCONTINUED | OUTPATIENT
Start: 2025-07-01 | End: 2025-07-03 | Stop reason: HOSPADM

## 2025-07-01 RX ORDER — METOPROLOL SUCCINATE 50 MG/1
50 TABLET, EXTENDED RELEASE ORAL DAILY
Status: DISCONTINUED | OUTPATIENT
Start: 2025-07-02 | End: 2025-07-01

## 2025-07-01 RX ORDER — ONDANSETRON 4 MG/1
4 TABLET, FILM COATED ORAL EVERY 8 HOURS PRN
Status: DISCONTINUED | OUTPATIENT
Start: 2025-07-01 | End: 2025-07-03 | Stop reason: HOSPADM

## 2025-07-01 RX ORDER — LISINOPRIL 10 MG/1
10 TABLET ORAL DAILY
COMMUNITY
End: 2025-07-03 | Stop reason: HOSPADM

## 2025-07-01 RX ORDER — ENOXAPARIN SODIUM 100 MG/ML
40 INJECTION SUBCUTANEOUS EVERY 24 HOURS
Status: DISCONTINUED | OUTPATIENT
Start: 2025-07-01 | End: 2025-07-01

## 2025-07-01 RX ORDER — PANTOPRAZOLE SODIUM 40 MG/1
40 TABLET, DELAYED RELEASE ORAL
Status: DISCONTINUED | OUTPATIENT
Start: 2025-07-02 | End: 2025-07-03 | Stop reason: HOSPADM

## 2025-07-01 RX ORDER — FLUTICASONE PROPIONATE 50 MCG
2 SPRAY, SUSPENSION (ML) NASAL DAILY
COMMUNITY

## 2025-07-01 RX ORDER — FUROSEMIDE 10 MG/ML
20 INJECTION INTRAMUSCULAR; INTRAVENOUS DAILY
Status: DISCONTINUED | OUTPATIENT
Start: 2025-07-01 | End: 2025-07-02

## 2025-07-01 RX ADMIN — METOPROLOL SUCCINATE 50 MG: 50 TABLET, EXTENDED RELEASE ORAL at 19:52

## 2025-07-01 RX ADMIN — ATORVASTATIN CALCIUM 40 MG: 20 TABLET ORAL at 19:52

## 2025-07-01 RX ADMIN — APIXABAN 5 MG: 5 TABLET, FILM COATED ORAL at 19:52

## 2025-07-01 RX ADMIN — FUROSEMIDE 40 MG: 10 INJECTION, SOLUTION INTRAMUSCULAR; INTRAVENOUS at 10:53

## 2025-07-01 RX ADMIN — SOTALOL HYDROCHLORIDE 120 MG: 120 TABLET ORAL at 19:52

## 2025-07-01 RX ADMIN — LISINOPRIL 10 MG: 10 TABLET ORAL at 19:52

## 2025-07-01 RX ADMIN — FUROSEMIDE 20 MG: 10 INJECTION, SOLUTION INTRAMUSCULAR; INTRAVENOUS at 15:25

## 2025-07-01 SDOH — ECONOMIC STABILITY: HOUSING INSECURITY: AT ANY TIME IN THE PAST 12 MONTHS, WERE YOU HOMELESS OR LIVING IN A SHELTER (INCLUDING NOW)?: NO

## 2025-07-01 SDOH — ECONOMIC STABILITY: FOOD INSECURITY: HOW HARD IS IT FOR YOU TO PAY FOR THE VERY BASICS LIKE FOOD, HOUSING, MEDICAL CARE, AND HEATING?: SOMEWHAT HARD

## 2025-07-01 SDOH — ECONOMIC STABILITY: HOUSING INSECURITY: IN THE PAST 12 MONTHS, HOW MANY TIMES HAVE YOU MOVED WHERE YOU WERE LIVING?: 1

## 2025-07-01 SDOH — SOCIAL STABILITY: SOCIAL INSECURITY: WITHIN THE LAST YEAR, HAVE YOU BEEN HUMILIATED OR EMOTIONALLY ABUSED IN OTHER WAYS BY YOUR PARTNER OR EX-PARTNER?: NO

## 2025-07-01 SDOH — ECONOMIC STABILITY: TRANSPORTATION INSECURITY: IN THE PAST 12 MONTHS, HAS LACK OF TRANSPORTATION KEPT YOU FROM MEDICAL APPOINTMENTS OR FROM GETTING MEDICATIONS?: NO

## 2025-07-01 SDOH — SOCIAL STABILITY: SOCIAL INSECURITY: ABUSE: ADULT

## 2025-07-01 SDOH — ECONOMIC STABILITY: HOUSING INSECURITY: IN THE LAST 12 MONTHS, WAS THERE A TIME WHEN YOU WERE NOT ABLE TO PAY THE MORTGAGE OR RENT ON TIME?: NO

## 2025-07-01 SDOH — SOCIAL STABILITY: SOCIAL INSECURITY: DO YOU FEEL ANYONE HAS EXPLOITED OR TAKEN ADVANTAGE OF YOU FINANCIALLY OR OF YOUR PERSONAL PROPERTY?: NO

## 2025-07-01 SDOH — SOCIAL STABILITY: SOCIAL INSECURITY: ARE THERE ANY APPARENT SIGNS OF INJURIES/BEHAVIORS THAT COULD BE RELATED TO ABUSE/NEGLECT?: NO

## 2025-07-01 SDOH — ECONOMIC STABILITY: INCOME INSECURITY: IN THE PAST 12 MONTHS HAS THE ELECTRIC, GAS, OIL, OR WATER COMPANY THREATENED TO SHUT OFF SERVICES IN YOUR HOME?: NO

## 2025-07-01 SDOH — SOCIAL STABILITY: SOCIAL INSECURITY: DOES ANYONE TRY TO KEEP YOU FROM HAVING/CONTACTING OTHER FRIENDS OR DOING THINGS OUTSIDE YOUR HOME?: NO

## 2025-07-01 SDOH — SOCIAL STABILITY: SOCIAL INSECURITY: ARE YOU OR HAVE YOU BEEN THREATENED OR ABUSED PHYSICALLY, EMOTIONALLY, OR SEXUALLY BY ANYONE?: NO

## 2025-07-01 SDOH — SOCIAL STABILITY: SOCIAL INSECURITY: WITHIN THE LAST YEAR, HAVE YOU BEEN AFRAID OF YOUR PARTNER OR EX-PARTNER?: NO

## 2025-07-01 SDOH — SOCIAL STABILITY: SOCIAL INSECURITY: HAS ANYONE EVER THREATENED TO HURT YOUR FAMILY OR YOUR PETS?: NO

## 2025-07-01 SDOH — ECONOMIC STABILITY: FOOD INSECURITY: WITHIN THE PAST 12 MONTHS, YOU WORRIED THAT YOUR FOOD WOULD RUN OUT BEFORE YOU GOT THE MONEY TO BUY MORE.: NEVER TRUE

## 2025-07-01 SDOH — ECONOMIC STABILITY: FOOD INSECURITY: WITHIN THE PAST 12 MONTHS, THE FOOD YOU BOUGHT JUST DIDN'T LAST AND YOU DIDN'T HAVE MONEY TO GET MORE.: NEVER TRUE

## 2025-07-01 SDOH — SOCIAL STABILITY: SOCIAL INSECURITY: HAVE YOU HAD THOUGHTS OF HARMING ANYONE ELSE?: NO

## 2025-07-01 SDOH — SOCIAL STABILITY: SOCIAL INSECURITY: DO YOU FEEL UNSAFE GOING BACK TO THE PLACE WHERE YOU ARE LIVING?: NO

## 2025-07-01 SDOH — SOCIAL STABILITY: SOCIAL INSECURITY: HAVE YOU HAD ANY THOUGHTS OF HARMING ANYONE ELSE?: NO

## 2025-07-01 ASSESSMENT — COGNITIVE AND FUNCTIONAL STATUS - GENERAL
MOBILITY SCORE: 23
DAILY ACTIVITIY SCORE: 24
PATIENT BASELINE BEDBOUND: NO
DAILY ACTIVITIY SCORE: 24
MOBILITY SCORE: 23
CLIMB 3 TO 5 STEPS WITH RAILING: A LITTLE
CLIMB 3 TO 5 STEPS WITH RAILING: A LITTLE

## 2025-07-01 ASSESSMENT — ACTIVITIES OF DAILY LIVING (ADL)
JUDGMENT_ADEQUATE_SAFELY_COMPLETE_DAILY_ACTIVITIES: YES
HEARING - LEFT EAR: HEARING AID
LACK_OF_TRANSPORTATION: NO
WALKS IN HOME: INDEPENDENT
GROOMING: INDEPENDENT
TOILETING: INDEPENDENT
DRESSING YOURSELF: INDEPENDENT
FEEDING YOURSELF: INDEPENDENT
BATHING: INDEPENDENT
ADEQUATE_TO_COMPLETE_ADL: YES
HEARING - RIGHT EAR: HEARING AID
PATIENT'S MEMORY ADEQUATE TO SAFELY COMPLETE DAILY ACTIVITIES?: YES

## 2025-07-01 ASSESSMENT — LIFESTYLE VARIABLES
HOW MANY STANDARD DRINKS CONTAINING ALCOHOL DO YOU HAVE ON A TYPICAL DAY: PATIENT DOES NOT DRINK
SKIP TO QUESTIONS 9-10: 1
EVER FELT BAD OR GUILTY ABOUT YOUR DRINKING: NO
TOTAL SCORE: 0
AUDIT-C TOTAL SCORE: 0
AUDIT-C TOTAL SCORE: 0
HOW OFTEN DO YOU HAVE A DRINK CONTAINING ALCOHOL: NEVER
HAVE PEOPLE ANNOYED YOU BY CRITICIZING YOUR DRINKING: NO
EVER HAD A DRINK FIRST THING IN THE MORNING TO STEADY YOUR NERVES TO GET RID OF A HANGOVER: NO
HOW OFTEN DO YOU HAVE 6 OR MORE DRINKS ON ONE OCCASION: NEVER
HAVE YOU EVER FELT YOU SHOULD CUT DOWN ON YOUR DRINKING: NO

## 2025-07-01 ASSESSMENT — PAIN - FUNCTIONAL ASSESSMENT
PAIN_FUNCTIONAL_ASSESSMENT: 0-10

## 2025-07-01 ASSESSMENT — PAIN SCALES - GENERAL
PAINLEVEL_OUTOF10: 0 - NO PAIN

## 2025-07-01 NOTE — H&P
"History Of Present Illness  Elroy Edmonds \"Len\" is a 75 y.o. male with PMH A-fib on Eliquis, COPD, T2DM, hypertension, and HLD who is presenting with presenting with worsening shortness of breath.  Patient states he has noticed worsening shortness of breath over the past couple weeks.  Patient follows cardiology as outpatient.  Patient states he was taking his blood pressure twice a day and noticed it was in the 100s called his doctor and cut back on some of his BP meds.  Yesterday he did not feel well and took his blood pressure and it was elevated so he took an additional dose of his medication.  This morning blood pressure was 180 and he was worsening shortness of breath unable to ambulate 10 feet.  Patient called his physician and was instructed to go to the emergency room.  Patient denies chest pain.  Denies abdominal pain, nausea, vomiting or diarrhea.  Per his wife he noticed that his abdomen was slightly distended prior to coming to the emergency room.  Denies fever or chills.   troponins negative.  CXR shows cardiomegaly unchanged.  No consolidation or edema.  Patient was given 40 mg Lasix in the emergency room with not much improvement.  Patient will be admitted for further evaluation and treatment.     Past Medical History  He has a past medical history of Arcus senilis, bilateral (07/17/2018), Arcus senilis, bilateral (07/17/2018), Atrial fibrillation (Multi), Combined forms of age-related cataract, bilateral (07/17/2018), COPD (chronic obstructive pulmonary disease) (Multi), Coronary artery disease, Dry eye syndrome of unspecified lacrimal gland (07/17/2018), Dry eye syndrome of unspecified lacrimal gland (07/17/2018), Hyperlipidemia, Hypertension, Personal history of other diseases of the circulatory system (04/20/2016), Personal history of other diseases of the musculoskeletal system and connective tissue, Personal history of other diseases of the nervous system and sense organs " (04/20/2016), Presence of intraocular lens (07/17/2018), Presence of intraocular lens (07/17/2018), Strain of muscle(s) and tendon(s) of the rotator cuff of left shoulder, initial encounter (10/22/2019), Strain of muscle(s) and tendon(s) of the rotator cuff of left shoulder, subsequent encounter (01/14/2020), and Unspecified cataract (04/20/2016).    Surgical History  He has a past surgical history that includes Cholecystectomy (04/20/2016); Other surgical history (01/26/2022); Coronary angioplasty with stent (07/17/2018); Other surgical history (12/28/2021); Other surgical history (12/28/2021); Cataract extraction (10/14/2016); and Blepharoptosis repair (Bilateral).     Social History  He reports that he quit smoking about 25 years ago. His smoking use included cigarettes. He has never been exposed to tobacco smoke. He has never used smokeless tobacco. He reports that he does not currently use alcohol. He reports that he does not use drugs.    Family History  Family History[1]     Allergies  Jardiance [empagliflozin]    Review of Systems   Review of systems: 10 system were reviewed and were negative except what was mentioned in history of present illness    Physical Exam  Constitutional:       Appearance: He is ill-appearing.   HENT:      Head: Normocephalic.      Mouth/Throat:      Mouth: Mucous membranes are moist.   Eyes:      Pupils: Pupils are equal, round, and reactive to light.   Cardiovascular:      Rate and Rhythm: Normal rate and regular rhythm.      Heart sounds: Normal heart sounds, S1 normal and S2 normal.   Pulmonary:      Effort: Pulmonary effort is normal.      Breath sounds: Normal breath sounds.      Comments: Fine crackles in bases  Abdominal:      General: Bowel sounds are normal.      Palpations: Abdomen is soft.   Musculoskeletal:         General: Normal range of motion.      Cervical back: Neck supple.      Right lower leg: Edema present.      Left lower leg: Edema present.   Skin:      General: Skin is warm.   Neurological:      Mental Status: He is alert and oriented to person, place, and time.      Motor: Weakness present.   Psychiatric:         Mood and Affect: Mood normal.         Behavior: Behavior normal.          Last Recorded Vitals  /70   Pulse 53   Temp 36.8 °C (98.2 °F)   Resp 20   Wt 125 kg (275 lb 5.7 oz)   SpO2 94%     Relevant Results  CBC:   Results from last 7 days   Lab Units 07/01/25  1011   WBC AUTO x10*3/uL 9.3   RBC AUTO x10*6/uL 5.38   HEMOGLOBIN g/dL 13.3*   HEMATOCRIT % 43.1   MCV fL 80   MCH pg 24.7*   MCHC g/dL 30.9*   RDW % 15.2*   PLATELETS AUTO x10*3/uL 272     CMP:    Results from last 7 days   Lab Units 07/01/25  1011   SODIUM mmol/L 140   POTASSIUM mmol/L 4.4   CHLORIDE mmol/L 105   CO2 mmol/L 30   BUN mg/dL 15   CREATININE mg/dL 0.86   GLUCOSE mg/dL 115*   PROTEIN TOTAL g/dL 7.0   CALCIUM mg/dL 9.0   BILIRUBIN TOTAL mg/dL 1.3*   ALK PHOS U/L 88   AST U/L 20   ALT U/L 22     BMP:    Results from last 7 days   Lab Units 07/01/25  1011   SODIUM mmol/L 140   POTASSIUM mmol/L 4.4   CHLORIDE mmol/L 105   CO2 mmol/L 30   BUN mg/dL 15   CREATININE mg/dL 0.86   CALCIUM mg/dL 9.0   GLUCOSE mg/dL 115*     Magnesium:  Results from last 7 days   Lab Units 07/01/25  1011   MAGNESIUM mg/dL 1.90     Troponin:    Results from last 7 days   Lab Units 07/01/25  1054 07/01/25  1011   TROPHS ng/L 9 9     BNP:   Results from last 7 days   Lab Units 07/01/25  1011   BNP pg/mL 358*     Lipid Panel:    Imagining  XR chest 1 view  Narrative: Interpreted By:  Juice Burch,   STUDY:  XR CHEST 1 VIEW      INDICATION:  Signs/Symptoms:CHF.      COMPARISON:  August 5      ACCESSION NUMBER(S):  [id number]      ORDERING CLINICIAN:  SANAM DE LEON      FINDINGS:  Cardiomegaly unchanged. No consolidation or edema. No effusion.      Impression: No evidence of acute intrathoracic abnormality.      Signed by: Juice Burch 7/1/2025 10:47 AM  Dictation workstation:   [id number]  ECG 12  lead  Sinus bradycardia  Otherwise normal ECG  When compared with ECG of 31-DEC-2024 15:12,  No significant change was found       Assessment/Plan      SOB  ?  New onset CHF    -CXR shows cardiomegaly unchanged  - previous 356 (12/31)  -Lasix in ER  -Continue Lasix 20 mg IV daily  -Cardiology consult  -daily labs      A-fib on Eliquis  COPD  T2DM  HTN/HLD  -Resume home meds    DVTp: Eliquis    PLAN: Home with wife    Erika CISNEROS Rowan, APRN-CNP    Plan of care was discussed extensively with patient.  Patient verbalized understanding through teach back method.  All question and concerns addressed upon examination.    Of note, this documentation is completed using the Dragon Dictation system (voice recognition software). There may be spelling and/or grammatical errors that were not corrected prior to final submission.           [1]   Family History  Problem Relation Name Age of Onset    Hypertension Mother      Atrial fibrillation Mother      Cataracts Mother      Glaucoma Mother      Heart attack Father      Cancer Father

## 2025-07-01 NOTE — ED PROVIDER NOTES
HPI: The patient is a 75-year-old with history of A-fib on Eliquis and sotalol, COPD, diabetes, hypertension, hyperlipidemia, pulmonary hypertension who presents to the Emergency Department with a chief complaint of a few weeks of progressive shortness of breath.  Patient reports that his symptoms progressed the point where he can no longer walk even a few feet.  He reports he can no longer lie flat and has to sleep propped up.  He reports that last few months he is gained about 15 pounds and does not take a water pill.  Denies any chest pain black or bloody stools or abdominal pain.  He reports his been taking all of his medications as prescribed.  He reports that he is still taking his blood thinner.  Does report intermittent palpitations which makes him feel like he is going in and out of A-fib.     PAST MEDICAL HISTORY:  as per HPI  ALLERGIES:  as per HPI  MEDICATIONS:  as per HPI  FAMILY HISTORY: as per HPI  SURGICAL HISTORY: as per HPI  SOCIAL HISTORY: as per HPI     PHYSICAL EXAM:  VITAL SIGNS: Nursing notes reviewed.  GENERAL:  Alert and interactive  EYES:   Eyes track.  ENT:  Airway patent.  RESPIRATORY:  Nonlabored breathing.  Clear.  CARDIOVASCULAR:  [Regular rate.] [Regular rhythm.]  No loud murmurs.  GASTROINTESTINAL:  No distension.  MUSCULOSKELETAL:  No deformity.  Pitting edema bilateral lower extremities up to the knees.  Dorsalis pedis pulses 2+ and equal bilaterally  NEUROLOGICAL:  Awake.  SKIN:  Dry.        MEDICAL DECISION MAKING (MDM):     DIAGNOSTIC STUDIES  Labs: BMP, CBC, CMP, Agnesian level, viral swabs, troponin  Radiology: Chest x-ray     EKG 1002  Per my interpretation:  Electrocardiogram ECG  RATE: 52  RHYTHM: [Sinus]  AXIS: [Normal]  INTERVALS: [Normal]  ST-T WAVE CHANGES: [Normal]  ABNORMALITIES/COMPARISON: No ST segment elevation or depression.  Sinus bradycardia.  Unchanged from December 31, 2024.       REVIEW OF OLD RECORDS: Reviewed the outpatient progress note from 3/12/2025      SUMMARY:  The patient is admitted to the Emergency Department for evaluation of above. Complete history and physical examination was performed by me.  Patient presents with symptoms most consistent with a CHF exacerbation.  Patient has lower extremity edema, weight gain and dyspnea with exertion.  His EKG shows sinus bradycardia without ST segment elevation depression to point towards occlusive MI.  Initial troponin was normal which is reassuring.  BNP was elevated which is concerning for CHF.  No evidence of a significant anemia to point towards anemia as a cause of his presentation.  Chest x-ray did not show any evidence of pulmonary edema or pneumonia.  Viral swabs were negative.  Potassium was normal so I gave him a dose of Lasix to initiate diuresis.  I reviewed his previous echo which showed mild valvular regurgitation but is possible these have progressed and are contributing to his symptoms.  Patient to be admitted for further diuresis given I think that outpatient diuresis would not work for this patient due to his significant dyspnea with even mild exertion.     DIAGNOSIS:    CHF exacerbation  Dyspnea     DISPOSITION:    1) admission     Kev Baum MD  07/01/25 [id number]

## 2025-07-01 NOTE — CARE PLAN
The patient's goals for the shift include      The clinical goals for the shift include get admitted

## 2025-07-01 NOTE — TELEPHONE ENCOUNTER
Patient called and stated he wanted to get in to see a provider due to feeling out of rhythm with SOB and lightheadedness. Patient stated he has a hx of a fib. While on the phone, patient took his BP with automatic cuff- 181/96, HR 56. Due to increased BP and being symptomatic, this nurse advised the patient to go to the ER. Patient verbalized understanding and stated he would go to [facility]. Report called to [facility] and given to [place].

## 2025-07-02 LAB
ANION GAP SERPL CALC-SCNC: 13 MMOL/L (ref 10–20)
BUN SERPL-MCNC: 20 MG/DL (ref 6–23)
CALCIUM SERPL-MCNC: 9.3 MG/DL (ref 8.6–10.3)
CHLORIDE SERPL-SCNC: 100 MMOL/L (ref 98–107)
CO2 SERPL-SCNC: 31 MMOL/L (ref 21–32)
CREAT SERPL-MCNC: 1.01 MG/DL (ref 0.5–1.3)
EGFRCR SERPLBLD CKD-EPI 2021: 78 ML/MIN/1.73M*2
ERYTHROCYTE [DISTWIDTH] IN BLOOD BY AUTOMATED COUNT: 15.3 % (ref 11.5–14.5)
EST. AVERAGE GLUCOSE BLD GHB EST-MCNC: 137 MG/DL
GLUCOSE BLD MANUAL STRIP-MCNC: 125 MG/DL (ref 74–99)
GLUCOSE BLD MANUAL STRIP-MCNC: 129 MG/DL (ref 74–99)
GLUCOSE BLD MANUAL STRIP-MCNC: 134 MG/DL (ref 74–99)
GLUCOSE SERPL-MCNC: 112 MG/DL (ref 74–99)
HBA1C MFR BLD: 6.4 % (ref ?–5.7)
HCT VFR BLD AUTO: 42.3 % (ref 41–52)
HGB BLD-MCNC: 13.3 G/DL (ref 13.5–17.5)
HOLD SPECIMEN: NORMAL
MCH RBC QN AUTO: 24.5 PG (ref 26–34)
MCHC RBC AUTO-ENTMCNC: 31.4 G/DL (ref 32–36)
MCV RBC AUTO: 78 FL (ref 80–100)
NRBC BLD-RTO: 0 /100 WBCS (ref 0–0)
PLATELET # BLD AUTO: 301 X10*3/UL (ref 150–450)
POTASSIUM SERPL-SCNC: 3.9 MMOL/L (ref 3.5–5.3)
RBC # BLD AUTO: 5.43 X10*6/UL (ref 4.5–5.9)
SODIUM SERPL-SCNC: 140 MMOL/L (ref 136–145)
WBC # BLD AUTO: 11.5 X10*3/UL (ref 4.4–11.3)

## 2025-07-02 PROCEDURE — 2500000001 HC RX 250 WO HCPCS SELF ADMINISTERED DRUGS (ALT 637 FOR MEDICARE OP)

## 2025-07-02 PROCEDURE — 99232 SBSQ HOSP IP/OBS MODERATE 35: CPT

## 2025-07-02 PROCEDURE — 99223 1ST HOSP IP/OBS HIGH 75: CPT | Performed by: INTERNAL MEDICINE

## 2025-07-02 PROCEDURE — 1200000002 HC GENERAL ROOM WITH TELEMETRY DAILY

## 2025-07-02 PROCEDURE — 2500000002 HC RX 250 W HCPCS SELF ADMINISTERED DRUGS (ALT 637 FOR MEDICARE OP, ALT 636 FOR OP/ED)

## 2025-07-02 PROCEDURE — 2500000004 HC RX 250 GENERAL PHARMACY W/ HCPCS (ALT 636 FOR OP/ED): Mod: JW | Performed by: NURSE PRACTITIONER

## 2025-07-02 PROCEDURE — 80048 BASIC METABOLIC PNL TOTAL CA: CPT

## 2025-07-02 PROCEDURE — 36415 COLL VENOUS BLD VENIPUNCTURE: CPT

## 2025-07-02 PROCEDURE — 2500000001 HC RX 250 WO HCPCS SELF ADMINISTERED DRUGS (ALT 637 FOR MEDICARE OP): Performed by: NURSE PRACTITIONER

## 2025-07-02 PROCEDURE — 82947 ASSAY GLUCOSE BLOOD QUANT: CPT

## 2025-07-02 PROCEDURE — 85027 COMPLETE CBC AUTOMATED: CPT

## 2025-07-02 PROCEDURE — 83036 HEMOGLOBIN GLYCOSYLATED A1C: CPT | Mod: ELYLAB

## 2025-07-02 RX ORDER — DEXTROSE 50 % IN WATER (D50W) INTRAVENOUS SYRINGE
12.5
Status: DISCONTINUED | OUTPATIENT
Start: 2025-07-02 | End: 2025-07-03 | Stop reason: HOSPADM

## 2025-07-02 RX ORDER — LISINOPRIL 5 MG/1
5 TABLET ORAL DAILY
Status: DISCONTINUED | OUTPATIENT
Start: 2025-07-03 | End: 2025-07-03 | Stop reason: HOSPADM

## 2025-07-02 RX ORDER — INSULIN LISPRO 100 [IU]/ML
0-10 INJECTION, SOLUTION INTRAVENOUS; SUBCUTANEOUS
Status: DISCONTINUED | OUTPATIENT
Start: 2025-07-02 | End: 2025-07-03 | Stop reason: HOSPADM

## 2025-07-02 RX ORDER — DEXTROSE 50 % IN WATER (D50W) INTRAVENOUS SYRINGE
25
Status: DISCONTINUED | OUTPATIENT
Start: 2025-07-02 | End: 2025-07-03 | Stop reason: HOSPADM

## 2025-07-02 RX ORDER — FUROSEMIDE 10 MG/ML
20 INJECTION INTRAMUSCULAR; INTRAVENOUS EVERY 12 HOURS
Status: DISCONTINUED | OUTPATIENT
Start: 2025-07-02 | End: 2025-07-03 | Stop reason: HOSPADM

## 2025-07-02 RX ADMIN — FUROSEMIDE 20 MG: 10 INJECTION, SOLUTION INTRAMUSCULAR; INTRAVENOUS at 10:23

## 2025-07-02 RX ADMIN — Medication 10 MG: at 21:33

## 2025-07-02 RX ADMIN — ASPIRIN 81 MG: 81 TABLET, CHEWABLE ORAL at 09:22

## 2025-07-02 RX ADMIN — FUROSEMIDE 20 MG: 10 INJECTION, SOLUTION INTRAMUSCULAR; INTRAVENOUS at 20:00

## 2025-07-02 RX ADMIN — Medication 10 MG: at 01:12

## 2025-07-02 RX ADMIN — APIXABAN 5 MG: 5 TABLET, FILM COATED ORAL at 20:00

## 2025-07-02 RX ADMIN — SOTALOL HYDROCHLORIDE 120 MG: 120 TABLET ORAL at 09:22

## 2025-07-02 RX ADMIN — SOTALOL HYDROCHLORIDE 120 MG: 120 TABLET ORAL at 20:00

## 2025-07-02 RX ADMIN — APIXABAN 5 MG: 5 TABLET, FILM COATED ORAL at 09:23

## 2025-07-02 RX ADMIN — SPIRONOLACTONE 25 MG: 25 TABLET, COATED ORAL at 09:22

## 2025-07-02 RX ADMIN — PANTOPRAZOLE SODIUM 40 MG: 40 TABLET, DELAYED RELEASE ORAL at 06:01

## 2025-07-02 RX ADMIN — ATORVASTATIN CALCIUM 40 MG: 20 TABLET ORAL at 20:00

## 2025-07-02 RX ADMIN — METOPROLOL SUCCINATE 50 MG: 50 TABLET, EXTENDED RELEASE ORAL at 20:00

## 2025-07-02 ASSESSMENT — COGNITIVE AND FUNCTIONAL STATUS - GENERAL
MOBILITY SCORE: 23
CLIMB 3 TO 5 STEPS WITH RAILING: A LITTLE
MOBILITY SCORE: 23
DAILY ACTIVITIY SCORE: 24
DAILY ACTIVITIY SCORE: 24
CLIMB 3 TO 5 STEPS WITH RAILING: A LITTLE

## 2025-07-02 ASSESSMENT — PAIN - FUNCTIONAL ASSESSMENT
PAIN_FUNCTIONAL_ASSESSMENT: 0-10
PAIN_FUNCTIONAL_ASSESSMENT: 0-10

## 2025-07-02 ASSESSMENT — PAIN SCALES - GENERAL
PAINLEVEL_OUTOF10: 0 - NO PAIN
PAINLEVEL_OUTOF10: 0 - NO PAIN

## 2025-07-02 NOTE — CONSULTS
Cardiology Consult Note      Date:   7/2/2025  Patient name:  Elroy Edmonds  Date of admission:  7/1/2025  9:41 AM  MRN:   [id number]  YOB: 1950  Time of Consult:  9:32 AM    Consulting Cardiologist: Dr. Tommy Lopez, APRN, CNP  Primary Cardiologist:  Dr. Law Simpson  Referring Provider: Dr Snell      Admission Diagnosis:     Acute on chronic congestive heart failure, unspecified heart failure type      History of Present Illness:      Elroy Edmonds is a 75 y.o.  male patient who is being at the request of Dr. Snell for inpatient consultation of CHF. He was admitted on 7/1/2025.  Previous [facility] and [facility] records have been reviewed in detail.    Patient with a history of PAF, CAD, hypertension, dyslipidemia, chronic diastolic heart failure  Patient states the last couple days she has been having increased shortness of breath, peripheral edema, fatigue just not quite feeling like himself.  He states that he had lost about 20 pounds a couple months ago but then in the last 3 or 4 days he gained about 10 to 15 pounds.  When he was last in Dr. Luo's office he weighed 117 kg when he comes into the hospital this time 125 kg.  He states that when this happens and this has happened before he was not sure if his heart was out of rhythm but when they did the EKG was sinus bradycardia and his QTc was 455.  He states he did have a couple episodes of palpitations.  He is feeling a little bit better this morning I did talk to him that we are going to adjust his blood pressure medications and work on getting some of this fluid off slowly and I answered all of his questions  Positive for shortness of breath, fatigue, peripheral edema, lightheadedness  Denies chest pain, fever, chills, PND, orthopnea, claudications    EKG is sinus bradycardia with a QTc of 455  Sodium 140  Potassium 3.9  BUN 20  Creatinine 1.01  Chest x-ray mild vascular congestion  BNP  358  Trop 9, 9    Cardiac history  12/24 echo  CONCLUSIONS:   1. The left ventricular systolic function is normal, with a visually estimated ejection fraction of 60%.   2. No regional wall motion abnormalities.   3. There is normal right ventricular global systolic function.   4. There is no evidence of mitral valve stenosis.   5. Mild mitral valve regurgitation.   6. Mild tricuspid regurgitation is visualized.   7. Moderate aortic valve stenosis    3/22  ____________________________________________________________________________________  CONCLUSIONS:   1. The entire Left Main: 0% stenosis.   2. Left Anterior Descending Artery: contains patent previously placed stents.   3. Proximal, mid and distal LAD Lesion: The percent stenosis is 10-30%.   4. Mid CX Lesion: The percent stenosis is 10-30%.   5. Proximal, mid and distal RCA Lesion: The percent stenosis is 10-30%.       Allergies:     Allergies[1]      Past Medical History:     Medical History[2]    Past Surgical History:     Surgical History[3]    Family History:     Family History[4]    Social History:     Social History[5]    CURRENT INPATIENT MEDICATIONS    Scheduled Medications[6]  Continuous Medications[7]  Current Outpatient Medications   Medication Instructions    albuterol 90 mcg/actuation aerosol powdr breath activated inhaler 2 puffs, inhalation, Every 6 hours PRN    apixaban (Eliquis) 5 mg tablet 1 tablet, oral, 2 times daily    aspirin 81 mg chewable tablet 1 tablet, oral, Daily    atorvastatin (LIPITOR) 40 mg, oral, Nightly    budesonide-glycopyr-formoterol (Breztri Aerosphere) 160-9-4.8 mcg/actuation HFA aerosol inhaler 2 puffs, inhalation, 2 times daily    cetirizine (ZYRTEC) 10 mg, oral, Daily    colchicine 0.6 mg tablet Take 2 tablets initially and then 1 tablet one hour later. Then 1 tablet twice daily.    econazole nitrate 1 % cream Topical, 2 times daily PRN    fluticasone (Flonase) 50 mcg/actuation nasal spray 2 sprays, Each Nostril, Daily,  Shake gently. Before first use, prime pump. After use, clean tip and replace cap.    lidocaine (LMX) 4 % cream 1 Application, Topical, Daily PRN    lisinopril 10 mg, oral, Daily    metoprolol succinate XL (TOPROL-XL) 50 mg, oral, Daily, Do not crush or chew.    nitroglycerin (Nitrostat) 0.4 mg SL tablet Place 1 tablet under the tongue every 5 minutes as needed for Chest pain. May repeat up to 3 times. Call 911 if pain persists.    omeprazole OTC (PriLOSEC OTC) 20 mg EC tablet 1 tablet, oral, Daily before breakfast    sodium chloride (Ocean) 0.65 % nasal spray 1 spray, Each Nostril, 4 times daily PRN    sotalol (BETAPACE) 120 mg, oral, 2 times daily    spironolactone (Aldactone) 25 mg tablet 1 tablet, oral, Daily    tadalafil 20 mg, oral, Daily PRN    white petrolatum-mineral oiL (Tears Naturale PM) 94-3 % ophthalmic ointment 1 Application, Both Eyes, Nightly        Review of Systems:      12 point review of systems was obtained in detail and is negative other than that detailed above.      Vital Signs:     Vitals:    07/01/25 1523 07/01/25 1930 07/02/25 0108 07/02/25 0720   BP: 153/70 119/77 105/68 93/61   BP Location: Left arm Right arm Right arm    Patient Position: Lying Lying Sitting    Pulse: 53 108 72 80   Resp: 20 18 18    Temp: 36.8 °C (98.2 °F) 36.8 °C (98.2 °F) 36.4 °C (97.5 °F) 36.3 °C (97.3 °F)   TempSrc: Temporal Temporal Temporal    SpO2: 94% 91% 95% 94%   Weight:       Height:           Intake/Output Summary (Last 24 hours) at 7/2/2025 0932  Last data filed at 7/1/2025 1930  Gross per 24 hour   Intake --   Output 2425 ml   Net -2425 ml       Wt Readings from Last 4 Encounters:   07/01/25 125 kg (275 lb 5.7 oz)   03/12/25 119 kg (263 lb)   02/07/25 117 kg (259 lb)   01/09/25 115 kg (254 lb)       Physical Examination:     GENERAL APPEARANCE: Well developed, well nourished, in no acute distress.  CHEST: Symmetric and non-tender.  INTEGUMENT: Skin warm and dry, without gross excoriationis or  lesions.  HEENT: No gross abnormalities of conjunctiva, teeth, gums, oral mucosa  NECK: Supple, no JVD, no bruit. Thyroid not palpable. Carotid upstrokes normal.  NEURO/PSHCY: Alert and oriented x3; appropriate behavior and responses and responses, grossly normal cerebellar function with normal balance and coordination  LUNGS: scattered rales  HEART: S1, S2 regular with 1 out of 6 systolic murmur  ABDOMEN: Soft, nontender, no palpable hepatosplenomegaly, no mases, no bruits. Abdominal aorta not noted to be enlarged.  MUSCULOSKELETAL: Ambulatory with normal tandem gait.  EXTREMITIES: Warm with good color, no clubbing or cyanois. 1 plus edema  PERIPHERAL VASCULAR: Pulses present and equally palpable; 1+ throughout. No femoral bruits.      Lab:     CBC:   Results from last 7 days   Lab Units 07/02/25  0558 07/01/25  1011   WBC AUTO x10*3/uL 11.5* 9.3   RBC AUTO x10*6/uL 5.43 5.38   HEMOGLOBIN g/dL 13.3* 13.3*   HEMATOCRIT % 42.3 43.1   MCV fL 78* 80   MCH pg 24.5* 24.7*   MCHC g/dL 31.4* 30.9*   RDW % 15.3* 15.2*   PLATELETS AUTO x10*3/uL 301 272     CMP:    Results from last 7 days   Lab Units 07/02/25  0559 07/01/25  1011   SODIUM mmol/L 140 140   POTASSIUM mmol/L 3.9 4.4   CHLORIDE mmol/L 100 105   CO2 mmol/L 31 30   BUN mg/dL 20 15   CREATININE mg/dL 1.01 0.86   GLUCOSE mg/dL 112* 115*   PROTEIN TOTAL g/dL  --  7.0   CALCIUM mg/dL 9.3 9.0   BILIRUBIN TOTAL mg/dL  --  1.3*   ALK PHOS U/L  --  88   AST U/L  --  20   ALT U/L  --  22     BMP:    Results from last 7 days   Lab Units 07/02/25  0559 07/01/25  1011   SODIUM mmol/L 140 140   POTASSIUM mmol/L 3.9 4.4   CHLORIDE mmol/L 100 105   CO2 mmol/L 31 30   BUN mg/dL 20 15   CREATININE mg/dL 1.01 0.86   CALCIUM mg/dL 9.3 9.0   GLUCOSE mg/dL 112* 115*     Magnesium:  Results from last 7 days   Lab Units 07/01/25  1011   MAGNESIUM mg/dL 1.90     Troponin:    Results from last 7 days   Lab Units 07/01/25  1054 07/01/25  1011   TROPHS ng/L 9 9     BNP:   Results from last  7 days   Lab Units 07/01/25  1011   BNP pg/mL 358*       Diagnostic Studies:     ECG 12 lead  Result Date: 7/1/2025    Sinus bradycardia Otherwise normal ECG When compared with ECG of 31-DEC-2024 15:12, No significant change was found      Radiology:     XR chest 1 view   Final Result   No evidence of acute intrathoracic abnormality.        Signed by: Juice Burch 7/1/2025 10:47 AM   Dictation workstation:   [id number]          Problem List:     Problem List[8]    Assessment:   Acute on chronic diastolic heart failure NYHA class II  PAF now normal sinus rhythm  History of CAD  Hypertension  Dyslipidemia  EF 60%    Plan:   Tele monitoring  Serial enzymes  Daily EKGs  Echo done 12/24 EF normal  Aspirin 81 mg daily  Eliquis 5 mg p.o. twice daily  Lipitor 40 mg daily  Decrease lisinopril to 5 mg daily  Toprol XL 50 mg daily  Sotalol 120 mg p.o. twice daily  Aldactone 25 mg daily  Lasix 20 mg IV push every 12  Patient has an allergy to Jardiance  Strict intake and output  Daily weights  Knee-high SELAM hose  Keep magnesium greater than 2.0  Keep potassium tween 4.0-4.5    Thien Lopez CNP  [facility]    Of note, this documentation is completed using the Dragon Dictation system (voice recognition software). There may be spelling and/or grammatical errors that were not corrected prior to final submission.    Electronically signed by VANESSA Lomeli-CNP, on 7/2/2025 at 9:32 AM    I have personally interviewed and examined the patient.   I have personally and independently reviewed labs and diagnostic testing.  I have personally verified the elements of the history and physical listed above and changes, if any, are noted.   I have personally reviewed the assessment and plan as documented by PANCHO Neal, CNP and marek.    In summary, Mr. Elroy Edmonds has a history of atherosclerotic disease with previous angioplasty and stenting of the mid LAD and  angioplasty for Syonell in April 2017.  He has a history of moderate aortic stenosis and chronic diastolic congestive heart failure.  He has a history of hyperlipidemia, hypertension, and COPD.  He has a history of paroxysmal atrial fibrillation which has been treated with sotalol.  He is followed on the basis by Dr. Law Luo and Dr. Cheng Simpson.  A Holter monitor earlier this year showed normal sinus rhythm with a 5-hour episode of atrial fibrillation.  He is chronically anticoagulated with Eliquis.  Most recent echocardiogram December 30, 2024 showed an estimated LV ejection action 60%.  Mild mitral and tricuspid regurgitation along with moderate aortic stenosis.    He presented to [facility] department yesterday morning with complaints of worsening shortness of breath over the past few weeks.  This has progressed to point where he can no longer lie flat.  He gets winded after walking just a few steps.  He has noted waking up approximately 15 pounds.  He reports intermittent episodes of palpitations.  No chest discomfort.  Upon arrival he was noted to have stable vital signs with exception of blood pressure 190/88 mm.  And oxygen saturation 93%.  Heart rates in the 50s.  Lab studies show a hemoglobin 13.3 and WBC 9.3.  Basic metabolic profile normal.  BNP level 358.  High-sensitivity troponin were 9 and 9.  Magnesium 1.90.  EKG shows sinus bradycardia with heart rate 52 beats per minute.  QTc 455 ms.  Chest x-ray did not show any active disease.  He was admitted telemetry with a diagnosis of acute diastolic congestive heart failure.  He has been initiated on intravenous furosemide.  Overnight he had 2.5 L negative fluid balance.  He currently is feeling better.  Cardiac rhythm is regular.  Lungs have a few bilateral basilar rales.  1+ bilateral peripheral edema.  He just received another dose of IV furosemide.  He is intolerant to Jardiance.  Continue aspirin, lisinopril, Toprol XL, sotalol, and  Aldactone.   Patient notes his preference to be discharged later today or first thing tomorrow morning.  He states he is hoping to get a hip injection after his appointment with Dr. Diaz tomorrow.  He is planning to go on an [employer] cruise in 2 weeks.  Follow-up with Dr. Luo and Dr. Simpson as scheduled.         [1]   Allergies  Allergen Reactions    Jardiance [Empagliflozin] Rash     Yeast infection of the foreskin.   [2]   Past Medical History:  Diagnosis Date    Arcus senilis, bilateral 07/17/2018    Corneal arcus senilis, bilateral    Arcus senilis, bilateral 07/17/2018    Corneal arcus senilis, bilateral    Atrial fibrillation (Multi)     Combined forms of age-related cataract, bilateral 07/17/2018    Combined form of age-related cataract, both eyes    COPD (chronic obstructive pulmonary disease) (Multi)     Coronary artery disease     Dry eye syndrome of unspecified lacrimal gland 07/17/2018    Dry eye syndrome    Dry eye syndrome of unspecified lacrimal gland 07/17/2018    Dry eye syndrome    Hyperlipidemia     Hypertension     Personal history of other diseases of the circulatory system 04/20/2016    History of atrial fibrillation    Personal history of other diseases of the musculoskeletal system and connective tissue     History of osteoarthritis    Personal history of other diseases of the nervous system and sense organs 04/20/2016    History of cataract    Presence of intraocular lens 07/17/2018    Pseudophakia of right eye    Presence of intraocular lens 07/17/2018    Pseudophakia of right eye    Strain of muscle(s) and tendon(s) of the rotator cuff of left shoulder, initial encounter 10/22/2019    Traumatic complete tear of left rotator cuff, initial encounter    Strain of muscle(s) and tendon(s) of the rotator cuff of left shoulder, subsequent encounter 01/14/2020    Traumatic complete tear of left rotator cuff, subsequent encounter    Unspecified cataract 04/20/2016    Cataract of right eye    [3]   Past Surgical History:  Procedure Laterality Date    BLEPHAROPTOSIS REPAIR Bilateral     CATARACT EXTRACTION  10/14/2016    Cataract Surgery    CHOLECYSTECTOMY  2016    Cholecystectomy    CORONARY ANGIOPLASTY WITH STENT PLACEMENT  2018    Cath Placement Of Stent 1    OTHER SURGICAL HISTORY  2022    Neck surgery    OTHER SURGICAL HISTORY  2021    Colonoscopy    OTHER SURGICAL HISTORY  2021    Percutaneous transluminal coronary angioplasty   [4]   Family History  Problem Relation Name Age of Onset    Hypertension Mother      Atrial fibrillation Mother      Cataracts Mother      Glaucoma Mother      Heart attack Father      Cancer Father     [5]   Social History  Tobacco Use    Smoking status: Former     Current packs/day: 0.00     Types: Cigarettes     Quit date:      Years since quittin.5     Passive exposure: Never    Smokeless tobacco: Never   Substance Use Topics    Alcohol use: Not Currently     Comment: quit in     Drug use: Never   [6] apixaban, 5 mg, oral, BID  aspirin, 81 mg, oral, Daily  atorvastatin, 40 mg, oral, Nightly  [Held by provider] furosemide, 20 mg, intravenous, q12h  [START ON 7/3/2025] lisinopril, 5 mg, oral, Daily  metoprolol succinate XL, 50 mg, oral, Nightly  pantoprazole, 40 mg, oral, Daily before breakfast  polyethylene glycol, 17 g, oral, Daily  sotalol, 120 mg, oral, BID  spironolactone, 25 mg, oral, Daily    [7]    [8]   Patient Active Problem List  Diagnosis    Age-related nuclear cataract of left eye    Angina, class IV    Aortic stenosis    Arcus senilis, bilateral    Arteriosclerosis of abdominal aorta    Ascending aorta enlargement    Benign essential hypertension    Cataract of left eye    CAD S/P percutaneous coronary angioplasty    Blepharitis with rosacea    Chronic otitis externa    Corneal scars, both eyes    COVID-19    Dermatochalasis of both eyelids    Dysphagia    Dry eye syndrome    DNS (deviated nasal septum)     Fatigue    Erectile dysfunction due to arterial insufficiency    Dyspnea on exertion    Mixed hyperlipidemia    Keratoconjunctivitis sicca    Hypertrophy of inferior nasal turbinate    Pain in thoracic spine    Pain in periorbital region of right eye    Otorrhea of right ear    Nontraumatic incomplete tear of left rotator cuff    Paroxysmal atrial fibrillation (Multi)    Pseudophakia of right eye    Pulmonary hypertension (Multi)    Right renal mass    Skin lesion of neck    Zenker's diverticulum    Systolic ejection murmur    Subconjunctival hemorrhage of right eye    Sleep dysfunction with arousal disturbance    Class 2 obesity due to excess calories without serious comorbidity with body mass index (BMI) of 39.0 to 39.9 in adult    Bilateral sensorineural hearing loss    Controlled type 2 diabetes mellitus without complication, without long-term current use of insulin    Iron deficiency    Former cigarette smoker    BMI 37.0-37.9, adult    Chronic respiratory failure with hypoxia    Acute on chronic congestive heart failure, unspecified heart failure type

## 2025-07-02 NOTE — PROGRESS NOTES
07/02/25 1507   Discharge Planning   Living Arrangements Spouse/significant other   Support Systems Spouse/significant other   Assistance Needed PTA - none   Type of Residence Private residence   Home or Post Acute Services None   Expected Discharge Disposition Home   Intensity of Service   Intensity of Service 0-30 min     HOME at [place].

## 2025-07-02 NOTE — CONSULTS
Reason For Consult  Heart failure education    History Of Present Illness  Len Edmonds is a 75 y.o. male presenting with shortness of breath and weight gain.     Past Medical History  He has a past medical history of Arcus senilis, bilateral (07/17/2018), Arcus senilis, bilateral (07/17/2018), Atrial fibrillation (Multi), Combined forms of age-related cataract, bilateral (07/17/2018), COPD (chronic obstructive pulmonary disease) (Multi), Coronary artery disease, Dry eye syndrome of unspecified lacrimal gland (07/17/2018), Dry eye syndrome of unspecified lacrimal gland (07/17/2018), Hyperlipidemia, Hypertension, Personal history of other diseases of the circulatory system (04/20/2016), Personal history of other diseases of the musculoskeletal system and connective tissue, Personal history of other diseases of the nervous system and sense organs (04/20/2016), Presence of intraocular lens (07/17/2018), Presence of intraocular lens (07/17/2018), Strain of muscle(s) and tendon(s) of the rotator cuff of left shoulder, initial encounter (10/22/2019), Strain of muscle(s) and tendon(s) of the rotator cuff of left shoulder, subsequent encounter (01/14/2020), and Unspecified cataract (04/20/2016).    Surgical History  He has a past surgical history that includes Cholecystectomy (04/20/2016); Other surgical history (01/26/2022); Coronary angioplasty with stent (07/17/2018); Other surgical history (12/28/2021); Other surgical history (12/28/2021); Cataract extraction (10/14/2016); and Blepharoptosis repair (Bilateral).     Social History  He reports that he quit smoking about 25 years ago. His smoking use included cigarettes. He has never been exposed to tobacco smoke. He has never used smokeless tobacco. He reports that he does not currently use alcohol. He reports that he does not use drugs.    Family History  Family History[1]     Allergies  Jardiance [empagliflozin]    Review of Systems  deferred     Physical  "Exam  deferred     Last Recorded Vitals  Blood pressure 93/61, pulse 80, temperature 36.3 °C (97.3 °F), resp. rate 18, height 1.778 m (5' 10\"), weight 125 kg (275 lb 5.7 oz), SpO2 94%.    Relevant Results    TRANSTHORACIC ECHOCARDIOGRAM REPORT     Patient Name:       ERVIN TILLMAN МАРИНА     Reading Physician:    [id number] John Skinner DO  Study Date:         12/30/2024          Ordering Provider:    [id number] ALIYAH CANDELARIO  MRN/PID:            [id number]            Fellow:  Accession#:         [id number]        Nurse:  Date of Birth/Age:  1950 / 74 years Sonographer:          John Rose  Gender Assigned at                     Additional Staff:  Birth:  Height:             175.26 cm           Admit Date:           12/30/2024  Weight:             119.30 kg           Admission Status:     Outpatient  BSA / BMI:          2.32 m2 / 38.84     Department Location:  [facility]                      kg/m2                                     Pamela Benavides  Blood Pressure: 120 /80 mmHg     Study Type:    TRANSTHORACIC ECHO (TTE) COMPLETE  Diagnosis/ICD: Paroxysmal atrial fibrillation-I48.0; Nonrheumatic aortic (valve)                 stenosis-I35.0  Indication:    AF, Asc AO enlargment, AS  CPT Codes:     Echo Complete w Full [id number]     Patient History:  Smoker:            Former.  Diabetes:          Yes  Pertinent History: A-Fib, CAD, COPD, Dyspnea, HTN, Hyperlipidemia, Murmur and                     PTCA, Asc AO Enlarged, AS.     Study Detail: The following Echo studies were performed: 2D, Doppler, M-Mode and                color flow. The patient was awake.        PHYSICIAN INTERPRETATION:  Left Ventricle: The left ventricular systolic function is normal, with a visually estimated ejection fraction of 60%. There is mild concentric left ventricular hypertrophy. There are no " regional wall motion abnormalities. The left ventricular cavity size is normal. There is mild increased septal and mildly increased posterior left ventricular wall thickness. There is left ventricular concentric remodeling. Left ventricular diastolic filling was not assessed.  LV Wall Scoring:  All segments are normal.     Left Atrium: The left atrium is mildly dilated.  Right Ventricle: The right ventricle is upper limits of normal in size. There is normal right ventricular global systolic function.  Right Atrium: The right atrium is normal in size.  Aortic Valve: The aortic valve appears structurally normal. There is mild to moderate aortic valve cusp calcification. There is evidence of moderate aortic valve stenosis.  The aortic valve dimensionless index is 0.31. There is trace aortic valve regurgitation. The peak instantaneous gradient of the aortic valve is 44 mmHg. The mean gradient of the aortic valve is 24 mmHg.  Mitral Valve: The mitral valve is normal in structure. There is no evidence of mitral valve stenosis. The doppler estimated mean and peak diastolic pressure gradients are 3 mmHg and 7 mmHg respectively. There is normal mitral valve leaflet mobility. The peak instantaneous gradient of the mitral valve is 7 mmHg. There is mild mitral valve regurgitation.  Tricuspid Valve: The tricuspid valve is structurally normal. There is normal tricuspid valve leaflet mobility. There is mild tricuspid regurgitation.  Pulmonic Valve: The pulmonic valve is structurally normal. There is no indication of pulmonic valve regurgitation.  Pericardium: Trivial pericardial effusion.  Aorta: The aortic root is normal. There is mild dilatation of the aortic root.  Pulmonary Artery: The tricuspid regurgitant velocity is 2.55 m/s, and with an estimated right atrial pressure of 3 mmHg, the estimated pulmonary artery pressure is mildly elevated with the RVSP at 29.0 mmHg.  Systemic Veins: The inferior vena cava appears normal in  size.  In comparison to the previous echocardiogram(s): The left ventricular function is unchanged.        CONCLUSIONS:   1. The left ventricular systolic function is normal, with a visually estimated ejection fraction of 60%.   2. No regional wall motion abnormalities.   3. There is normal right ventricular global systolic function.   4. There is no evidence of mitral valve stenosis.   5. Mild mitral valve regurgitation.   6. Mild tricuspid regurgitation is visualized.   7. Moderate aortic valve stenosis.     Assessment/Plan     Heart Failure Nurse Navigator    The role of the HF nurse navigator is to (1) characterize risk profiles of patients with heart failure transitioning from [id number] after hospitalization, (2) recommend interventions to improve care and reduce risks of worse post-hospitalization outcomes. Potential recommendations may touch base on patient/family education, additional consults that may bring value, and appointment scheduling.    Asssessment    I met with Elroy Edmonds at the bedside, and my impressions include: Patient resting comfortably in chair.  Introduced myself and reason for visit.  Patient agreeable to education and discussion.  Patient endorses recent weight gain and increasing shortness of breath.  He has a history of afib for approximately 10 year.  We discussed the signs and symptoms of HF and when he is to call his cardiologist.  I went over following a low sodium diet.  Patient leaves for an [id number] shortly so we discussed making good choice when he picks his food.  Patient acknowledged understanding.      Patients Cardiologist(s): Dr. Luo    Patients Primary Care Provider: Dr. Diaz    1. Medical Domain  What is the patient's most recent LVEF? 60%  HFrEF (LVEF <= 40%) Quadruple therapy recommended  HFmrEF (LVEF 41-49%) Quadruple therapy recommended  HFpEF (LVEF >= 50%) Minimum recommendations: SGLT2i and MRA  Is the patient on OP GDMT for their  "condition?   ARNI/ACEI/ARB: No None  BB: Yes Metoprolol succinate 50 mg daily  MRA: Yes spironolactone 25 mg daily  SGLT2i: No None  Is the patient prescribed a diuretic? No  None  Does this patient have an implanted device (ie cardioMEMS, ICD, CRT-D)?  Device type: NA  Could this patient have advanced heart failure (Stage D heart failure)?: No   If yes, the potential markers of advanced heart failure include: NA    REFERENCE: Potential markers of advanced HF   Inotrope (dobutamine or milrinone) used during this admission?   LVEF<=25%?   >=2 hospitalizations for ADHF in the last year?   Severe symptoms of HF (fatigue, dyspnea, confusion, edema) despite medical therapy?   Downtitration of GDMT as compared to home medications?   Discontinuation of GDMT because of hypotension or renal intolerance?   Recurrent arrhythmias (AF, VT with ICD shocks)?   Cardiac cachexia (i.e., unintentional weight loss due to HF)?   High-risk biomarker profile (e.g., hyponatremia [Na<135], very elevated BNP, worsening kidney function)   Escalating doses of diuretics or persistent edema despite escalation     2. Mind and Emotion  Does this patient have possible cognitive impairment?: No   Ask the patient to memorize these 3 words: banana, sunrise, chair  Ask the patient to draw a clock with hands pointing at \"20 minutes after 8\"  Ask the patient to recall the 3 words  Score: Add number of words recalled + clock drawing (0 points for any errors, 2 points if correct)  Interpretation: A score of 0-2 suggests cognitive impairment is present, a score of 3-5 suggests cognitive impairment is absent  Does this patient have major depression?: No   Over the last 2 weeks: Little interest or pleasure in doing things? (Not at all +0, Several days +1, More than half the days +2, Nearly every day +3)  Over the last 2 weeks: Feeling down, depressed or hopeless? (Not at all +0, Several days +1, More than half the days +2, Nearly every day +3)  Score: Add " points  Interpretation: A score of 3 or more suggests that major depression is likely.     3. Physical Function  Could this patient be frail?: No   Defined by presence of all of these: slowness, weakness, shrinking, inactivity, exhaustion  Is this patient at risk for falling?: No   Defined by having experienced a fall in the last 12 months.    4. Social Determinants of Health  Transportation deficits?: No   Lack of insurance?: No   Poor financial resources?: No   Living conditions (homelessness, unstable home)?: No   Poor family/social support?: No   Poor personal care?: No   Food insecurity?: No   Lack of HCPOA?: No    I provided the following heart failure education:Met with patient for education. Discussed CHF, signs and symptoms, and when to call cardiologist. Reinforced the importance of following a Low Sodium Diet and monitoring daily weight, lower leg edema, and shortness of breath. Reviewed importance of taking prescribed medications after discharge. Reinforced importance of following up with cardiologist within a week of discharge. Reminded patient that they have my contact information should any questions or concerns arise.     IP Medications:  ARNi/ACEi/ARB: None  BB: Metoprolol succinate 50 mg daily  MRA: eplerenone 25 mg daily  SGLT2i: None  Diuretic: Furosemide 20 mg daily    Recommendations/ Plan  Ongoing support during hospitalization  Post discharge follow up phone call  Healthy at home    *Cardiology Discharge Appointment Follow-up Plan:               Alannah Louise RN         [1]   Family History  Problem Relation Name Age of Onset    Hypertension Mother      Atrial fibrillation Mother      Cataracts Mother      Glaucoma Mother      Heart attack Father      Cancer Father

## 2025-07-02 NOTE — DISCHARGE INSTRUCTIONS
HEART FAILURE EDUCATION:  1. Weigh yourself daily and record on your weight log.  2. If you gain more than 2 or 3 pounds overnight, call your cardiologist.  3. Follow a low sodium diet. No more than 2000 mg in one day, or more than 650 mg per meal.  4. Limit total fluids to no more than 8 cups (or 2 liters) per day - this includes all fluids (water, coffee, juice, milk, tea, etc.)  5. Monitor your blood pressure daily and record on your weight log.  6. Call to schedule your follow-up appointments when you get home if they were not already scheduled for you.  7. Keep your follow-up appointments! Bring your weight log with you so the doctors can see your weight trend and blood pressure readings.  8. Be sure to  any new prescriptions and take them as directed. If unsure of the medications, be sure to call your cardiologist.  9. Stay as active as you can tolerate.   10. If you notice subtle change of symptoms (slight increase in swelling, slight shortness of breath, a new intolerance to laying flat, a new cough), be sure to call your cardiologist.  11. If you have any questions or concerns or you have not heard back from the cardiologist, feel free to call Alannah Louise heart failure navigator at [phone].

## 2025-07-02 NOTE — CARE PLAN
The patient's goals for the shift include rest      The clinical goals for the shift include patient will have a reduction in edema and SOB.

## 2025-07-02 NOTE — CARE PLAN
The patient's goals for the shift include  decrease in SOB    The clinical goals for the shift include patient will have a reduction in edema and SOB

## 2025-07-02 NOTE — PROGRESS NOTES
"Daily Progress Note    Elroy Edmonds \"Len\" is a 75 y.o. male on day 0 of admission presenting with Acute on chronic congestive heart failure, unspecified heart failure type.    Subjective   Patient sitting up in chair breathing much improved.  BLE edema improved.  Appreciate cardiology consult.  Anticipate discharge tomorrow morning.  Patient has a doctor's appointment at 1:45 PM       Objective     Physical Exam    Physical Exam  Constitutional:       Appearance: Normal appearance.   HENT:      Head: Normocephalic.      Mouth/Throat:      Mouth: Mucous membranes are moist.   Eyes:      Pupils: Pupils are equal, round, and reactive to light.   Cardiovascular:      Rate and Rhythm: Normal rate and regular rhythm.      Heart sounds: Normal heart sounds, S1 normal and S2 normal.   Pulmonary:      Effort: Pulmonary effort is normal.      Breath sounds: Normal breath sounds.   Abdominal:      General: Bowel sounds are normal.      Palpations: Abdomen is soft.   Musculoskeletal:         General: Normal range of motion.      Cervical back: Neck supple.   Skin:     General: Skin is warm.   Neurological:      Mental Status: He is alert and oriented to person, place, and time.   Psychiatric:         Mood and Affect: Mood normal.         Behavior: Behavior normal.         Last Recorded Vitals  Blood pressure 93/61, pulse 80, temperature 36.3 °C (97.3 °F), resp. rate 18, height 1.778 m (5' 10\"), weight 125 kg (275 lb 5.7 oz), SpO2 94%.  Intake/Output last 3 Shifts:  I/O last 3 completed shifts:  In: - (0 mL/kg)   Out: 2425 (19.4 mL/kg) [Urine:2425 (0.5 mL/kg/hr)]  Weight: 124.9 kg     Medications  Scheduled medications  Scheduled Medications[1]  Continuous medications  Continuous Medications[2]  PRN medications  PRN Medications[3]    Labs  CBC:   Results from last 7 days   Lab Units 07/02/25  0558 07/01/25  1011   WBC AUTO x10*3/uL 11.5* 9.3   RBC AUTO x10*6/uL 5.43 5.38   HEMOGLOBIN g/dL 13.3* 13.3*   HEMATOCRIT % 42.3 43.1 " "  MCV fL 78* 80   MCH pg 24.5* 24.7*   MCHC g/dL 31.4* 30.9*   RDW % 15.3* 15.2*   PLATELETS AUTO x10*3/uL 301 272     CMP:    Results from last 7 days   Lab Units 07/02/25  0559 07/01/25  1011   SODIUM mmol/L 140 140   POTASSIUM mmol/L 3.9 4.4   CHLORIDE mmol/L 100 105   CO2 mmol/L 31 30   BUN mg/dL 20 15   CREATININE mg/dL 1.01 0.86   GLUCOSE mg/dL 112* 115*   PROTEIN TOTAL g/dL  --  7.0   CALCIUM mg/dL 9.3 9.0   BILIRUBIN TOTAL mg/dL  --  1.3*   ALK PHOS U/L  --  88   AST U/L  --  20   ALT U/L  --  22     BMP:    Results from last 7 days   Lab Units 07/02/25  0559 07/01/25  1011   SODIUM mmol/L 140 140   POTASSIUM mmol/L 3.9 4.4   CHLORIDE mmol/L 100 105   CO2 mmol/L 31 30   BUN mg/dL 20 15   CREATININE mg/dL 1.01 0.86   CALCIUM mg/dL 9.3 9.0   GLUCOSE mg/dL 112* 115*     Magnesium:  Results from last 7 days   Lab Units 07/01/25  1011   MAGNESIUM mg/dL 1.90     Troponin:    Results from last 7 days   Lab Units 07/01/25  1054 07/01/25  1011   TROPHS ng/L 9 9     BNP:   Results from last 7 days   Lab Units 07/01/25  1011   BNP pg/mL 358*     Lipid Panel:         Nutrition             Relevant Results  Results from last 7 days   Lab Units 07/02/25  0559 07/01/25  1011   GLUCOSE mg/dL 112* 115*     No results found for: \"HGBA1C\"     Assessment/Plan    SOB/fluid overload  CHF    -CXR shows cardiomegaly unchanged  -  -Heart failure navigator  -Continue Lasix 20 mg daily  -Daily weight  -Cardiology consult, knee-high Crow, IV Lasix monitor overnight  -daily labs    A-fib on Eliquis  COPD  T2DM  HTN/HLD  -Resume home  -Diabetic diet with Accu-Cheks and sliding scale    DVTp: Eliquis    PLAN: Home with wife    Erika CISNEROS Rowan, APRN-CNP    Plan of care was discussed extensively with patient.  Patient verbalized understanding through teach back method.  All question and concerns addressed upon examination.    Of note, this documentation is completed using the Dragon Dictation system (voice recognition software). " There may be spelling and/or grammatical errors that were not corrected prior to final submission.             [1] apixaban, 5 mg, oral, BID  aspirin, 81 mg, oral, Daily  atorvastatin, 40 mg, oral, Nightly  furosemide, 20 mg, intravenous, q12h  [START ON 7/3/2025] lisinopril, 5 mg, oral, Daily  metoprolol succinate XL, 50 mg, oral, Nightly  pantoprazole, 40 mg, oral, Daily before breakfast  polyethylene glycol, 17 g, oral, Daily  sotalol, 120 mg, oral, BID  spironolactone, 25 mg, oral, Daily    [2]    [3] PRN medications: acetaminophen **OR** acetaminophen **OR** acetaminophen, melatonin, ondansetron **OR** ondansetron

## 2025-07-03 ENCOUNTER — TELEPHONE (OUTPATIENT)
Dept: CARDIOLOGY | Facility: CLINIC | Age: 75
End: 2025-07-03

## 2025-07-03 ENCOUNTER — APPOINTMENT (OUTPATIENT)
Dept: PRIMARY CARE | Facility: CLINIC | Age: 75
End: 2025-07-03
Payer: MEDICARE

## 2025-07-03 ENCOUNTER — APPOINTMENT (OUTPATIENT)
Dept: CARDIOLOGY | Facility: HOSPITAL | Age: 75
End: 2025-07-03
Payer: MEDICARE

## 2025-07-03 VITALS
DIASTOLIC BLOOD PRESSURE: 66 MMHG | OXYGEN SATURATION: 96 % | BODY MASS INDEX: 37.74 KG/M2 | SYSTOLIC BLOOD PRESSURE: 118 MMHG | WEIGHT: 263 LBS | TEMPERATURE: 98 F | RESPIRATION RATE: 16 BRPM | HEART RATE: 88 BPM

## 2025-07-03 VITALS
HEIGHT: 70 IN | WEIGHT: 261.91 LBS | BODY MASS INDEX: 37.5 KG/M2 | SYSTOLIC BLOOD PRESSURE: 102 MMHG | RESPIRATION RATE: 16 BRPM | TEMPERATURE: 95.4 F | HEART RATE: 58 BPM | DIASTOLIC BLOOD PRESSURE: 55 MMHG | OXYGEN SATURATION: 95 %

## 2025-07-03 DIAGNOSIS — I27.20 PULMONARY HYPERTENSION (MULTI): ICD-10-CM

## 2025-07-03 DIAGNOSIS — E66.09 CLASS 2 OBESITY DUE TO EXCESS CALORIES WITHOUT SERIOUS COMORBIDITY WITH BODY MASS INDEX (BMI) OF 37.0 TO 37.9 IN ADULT: ICD-10-CM

## 2025-07-03 DIAGNOSIS — E78.2 MIXED HYPERLIPIDEMIA: ICD-10-CM

## 2025-07-03 DIAGNOSIS — E11.9 CONTROLLED TYPE 2 DIABETES MELLITUS WITHOUT COMPLICATION, WITHOUT LONG-TERM CURRENT USE OF INSULIN: ICD-10-CM

## 2025-07-03 DIAGNOSIS — I10 BENIGN ESSENTIAL HYPERTENSION: ICD-10-CM

## 2025-07-03 DIAGNOSIS — Z09 HOSPITAL DISCHARGE FOLLOW-UP: Primary | ICD-10-CM

## 2025-07-03 DIAGNOSIS — M70.62 TROCHANTERIC BURSITIS OF BOTH HIPS: ICD-10-CM

## 2025-07-03 DIAGNOSIS — I50.9 ACUTE ON CHRONIC CONGESTIVE HEART FAILURE, UNSPECIFIED HEART FAILURE TYPE: ICD-10-CM

## 2025-07-03 DIAGNOSIS — E66.01 MORBID (SEVERE) OBESITY DUE TO EXCESS CALORIES (MULTI): ICD-10-CM

## 2025-07-03 DIAGNOSIS — E66.812 CLASS 2 OBESITY DUE TO EXCESS CALORIES WITHOUT SERIOUS COMORBIDITY WITH BODY MASS INDEX (BMI) OF 37.0 TO 37.9 IN ADULT: ICD-10-CM

## 2025-07-03 DIAGNOSIS — M70.61 TROCHANTERIC BURSITIS OF BOTH HIPS: ICD-10-CM

## 2025-07-03 LAB
ANION GAP SERPL CALC-SCNC: 12 MMOL/L (ref 10–20)
ATRIAL RATE: 61 BPM
BUN SERPL-MCNC: 26 MG/DL (ref 6–23)
CALCIUM SERPL-MCNC: 9.5 MG/DL (ref 8.6–10.3)
CHLORIDE SERPL-SCNC: 99 MMOL/L (ref 98–107)
CO2 SERPL-SCNC: 31 MMOL/L (ref 21–32)
CREAT SERPL-MCNC: 1.08 MG/DL (ref 0.5–1.3)
EGFRCR SERPLBLD CKD-EPI 2021: 72 ML/MIN/1.73M*2
ERYTHROCYTE [DISTWIDTH] IN BLOOD BY AUTOMATED COUNT: 15.2 % (ref 11.5–14.5)
GLUCOSE BLD MANUAL STRIP-MCNC: 131 MG/DL (ref 74–99)
GLUCOSE SERPL-MCNC: 127 MG/DL (ref 74–99)
HCT VFR BLD AUTO: 43.8 % (ref 41–52)
HGB BLD-MCNC: 14 G/DL (ref 13.5–17.5)
HOLD SPECIMEN: NORMAL
MCH RBC QN AUTO: 24.8 PG (ref 26–34)
MCHC RBC AUTO-ENTMCNC: 32 G/DL (ref 32–36)
MCV RBC AUTO: 78 FL (ref 80–100)
NRBC BLD-RTO: 0 /100 WBCS (ref 0–0)
P AXIS: 27 DEGREES
P OFFSET: 200 MS
P ONSET: 143 MS
PLATELET # BLD AUTO: 276 X10*3/UL (ref 150–450)
POTASSIUM SERPL-SCNC: 4.1 MMOL/L (ref 3.5–5.3)
PR INTERVAL: 166 MS
Q ONSET: 226 MS
QRS COUNT: 10 BEATS
QRS DURATION: 106 MS
QT INTERVAL: 454 MS
QTC CALCULATION(BAZETT): 457 MS
QTC FREDERICIA: 456 MS
R AXIS: -29 DEGREES
RBC # BLD AUTO: 5.64 X10*6/UL (ref 4.5–5.9)
SODIUM SERPL-SCNC: 138 MMOL/L (ref 136–145)
T AXIS: 57 DEGREES
T OFFSET: 453 MS
VENTRICULAR RATE: 61 BPM
WBC # BLD AUTO: 12.9 X10*3/UL (ref 4.4–11.3)

## 2025-07-03 PROCEDURE — 99233 SBSQ HOSP IP/OBS HIGH 50: CPT | Performed by: NURSE PRACTITIONER

## 2025-07-03 PROCEDURE — 82947 ASSAY GLUCOSE BLOOD QUANT: CPT

## 2025-07-03 PROCEDURE — 93005 ELECTROCARDIOGRAM TRACING: CPT

## 2025-07-03 PROCEDURE — 99239 HOSP IP/OBS DSCHRG MGMT >30: CPT

## 2025-07-03 PROCEDURE — 80048 BASIC METABOLIC PNL TOTAL CA: CPT

## 2025-07-03 PROCEDURE — 36415 COLL VENOUS BLD VENIPUNCTURE: CPT

## 2025-07-03 PROCEDURE — 85027 COMPLETE CBC AUTOMATED: CPT

## 2025-07-03 PROCEDURE — 93010 ELECTROCARDIOGRAM REPORT: CPT | Performed by: INTERNAL MEDICINE

## 2025-07-03 PROCEDURE — 2500000002 HC RX 250 W HCPCS SELF ADMINISTERED DRUGS (ALT 637 FOR MEDICARE OP, ALT 636 FOR OP/ED)

## 2025-07-03 PROCEDURE — 2500000001 HC RX 250 WO HCPCS SELF ADMINISTERED DRUGS (ALT 637 FOR MEDICARE OP)

## 2025-07-03 RX ORDER — LIDOCAINE HYDROCHLORIDE 20 MG/ML
1 INJECTION, SOLUTION EPIDURAL; INFILTRATION; INTRACAUDAL; PERINEURAL
Status: COMPLETED | OUTPATIENT
Start: 2025-07-03 | End: 2025-07-03

## 2025-07-03 RX ORDER — LISINOPRIL 5 MG/1
5 TABLET ORAL DAILY
Qty: 30 TABLET | Refills: 11 | Status: SHIPPED | OUTPATIENT
Start: 2025-07-03 | End: 2026-07-03

## 2025-07-03 RX ORDER — METHYLPREDNISOLONE ACETATE 40 MG/ML
20 INJECTION, SUSPENSION INTRA-ARTICULAR; INTRALESIONAL; INTRAMUSCULAR; SOFT TISSUE
Status: COMPLETED | OUTPATIENT
Start: 2025-07-03 | End: 2025-07-03

## 2025-07-03 RX ORDER — FUROSEMIDE 20 MG/1
20 TABLET ORAL DAILY
Qty: 30 TABLET | Refills: 11 | Status: SHIPPED | OUTPATIENT
Start: 2025-07-03 | End: 2026-07-03

## 2025-07-03 RX ADMIN — LIDOCAINE HYDROCHLORIDE 1 ML: 20 INJECTION, SOLUTION EPIDURAL; INFILTRATION; INTRACAUDAL; PERINEURAL at 15:01

## 2025-07-03 RX ADMIN — METHYLPREDNISOLONE ACETATE 20 MG: 40 INJECTION, SUSPENSION INTRA-ARTICULAR; INTRALESIONAL; INTRAMUSCULAR; SOFT TISSUE at 15:01

## 2025-07-03 RX ADMIN — SPIRONOLACTONE 25 MG: 25 TABLET, COATED ORAL at 08:27

## 2025-07-03 RX ADMIN — SOTALOL HYDROCHLORIDE 120 MG: 120 TABLET ORAL at 08:27

## 2025-07-03 RX ADMIN — ASPIRIN 81 MG: 81 TABLET, CHEWABLE ORAL at 08:27

## 2025-07-03 RX ADMIN — PANTOPRAZOLE SODIUM 40 MG: 40 TABLET, DELAYED RELEASE ORAL at 06:07

## 2025-07-03 RX ADMIN — APIXABAN 5 MG: 5 TABLET, FILM COATED ORAL at 08:27

## 2025-07-03 ASSESSMENT — COGNITIVE AND FUNCTIONAL STATUS - GENERAL
DAILY ACTIVITIY SCORE: 24
MOBILITY SCORE: 24

## 2025-07-03 ASSESSMENT — PATIENT HEALTH QUESTIONNAIRE - PHQ9
2. FEELING DOWN, DEPRESSED OR HOPELESS: NOT AT ALL
1. LITTLE INTEREST OR PLEASURE IN DOING THINGS: NOT AT ALL
SUM OF ALL RESPONSES TO PHQ9 QUESTIONS 1 AND 2: 0
SUM OF ALL RESPONSES TO PHQ9 QUESTIONS 1 AND 2: 0
2. FEELING DOWN, DEPRESSED OR HOPELESS: NOT AT ALL
1. LITTLE INTEREST OR PLEASURE IN DOING THINGS: NOT AT ALL

## 2025-07-03 ASSESSMENT — ENCOUNTER SYMPTOMS
OCCASIONAL FEELINGS OF UNSTEADINESS: 0
LOSS OF SENSATION IN FEET: 0
DEPRESSION: 0

## 2025-07-03 ASSESSMENT — ANXIETY QUESTIONNAIRES
3. WORRYING TOO MUCH ABOUT DIFFERENT THINGS: NOT AT ALL
1. FEELING NERVOUS, ANXIOUS, OR ON EDGE: NOT AT ALL
2. NOT BEING ABLE TO STOP OR CONTROL WORRYING: NOT AT ALL
7. FEELING AFRAID AS IF SOMETHING AWFUL MIGHT HAPPEN: NOT AT ALL
5. BEING SO RESTLESS THAT IT IS HARD TO SIT STILL: NOT AT ALL
4. TROUBLE RELAXING: NOT AT ALL
6. BECOMING EASILY ANNOYED OR IRRITABLE: NOT AT ALL
GAD7 TOTAL SCORE: 0
IF YOU CHECKED OFF ANY PROBLEMS ON THIS QUESTIONNAIRE, HOW DIFFICULT HAVE THESE PROBLEMS MADE IT FOR YOU TO DO YOUR WORK, TAKE CARE OF THINGS AT HOME, OR GET ALONG WITH OTHER PEOPLE: NOT DIFFICULT AT ALL

## 2025-07-03 ASSESSMENT — PAIN SCALES - GENERAL: PAINLEVEL_OUTOF10: 0 - NO PAIN

## 2025-07-03 NOTE — CARE PLAN
The patient's goals for the shift include      The clinical goals for the shift include decrease SOB

## 2025-07-03 NOTE — ASSESSMENT & PLAN NOTE
Morbid (severe) obesity due to excess calories is stable.  Patient to continue with current medications and treatment plan.  Follow-up at least annually.

## 2025-07-03 NOTE — PROGRESS NOTES
[facility] Heart Progress Note           Rounding ABBIE/Cardiologist:  Navjot Lopez, APRN-CNP,   Primary Cardiologist: Dr. Law Luo    Date:  7/3/2025  Patient:  Elroy Edmonds  YOB: 1950  MRN:  [id number]   Admit Date:  7/1/2025      SUBJECTIVE:    7/3/25  Patient is awake alert and oriented x 3.  He states he is feeling much better at this time he states his shortness of breath is greatly improved.  He states he is walking more than 5 laps around the unit here on the ninth floor and he feels so much better.  We took off 4.8 L of fluid.  CHF nurses at the bedside did give him a scale he is lay keep his weight daily accurately and I answered all of his questions  Weight 119 KG  Telemetry NSR occ PAC  EKG sinus bradycardia with a QTc of 455    CONCLUSIONS:   1. The left ventricular systolic function is normal, with a visually estimated ejection fraction of 60%.   2. No regional wall motion abnormalities.   3. There is normal right ventricular global systolic function.   4. There is no evidence of mitral valve stenosis.   5. Mild mitral valve regurgitation.   6. Mild tricuspid regurgitation is visualized.   7. Moderate aortic valve stenosis.       VITALS:     Vitals:    07/02/25 1403 07/02/25 1956 07/03/25 0425 07/03/25 0600   BP: 100/58 115/64 105/74    BP Location:  Right arm Right arm    Patient Position:  Sitting Sitting    Pulse:  50 74    Resp:  18 16    Temp:  37 °C (98.6 °F) 36.3 °C (97.3 °F)    TempSrc:  Temporal Temporal    SpO2:  93% 96%    Weight:    119 kg (261 lb 14.5 oz)   Height:           Intake/Output Summary (Last 24 hours) at 7/3/2025 0756  Last data filed at 7/3/2025 0537  Gross per 24 hour   Intake --   Output 1950 ml   Net -1950 ml       Wt Readings from Last 4 Encounters:   07/03/25 119 kg (261 lb 14.5 oz)   03/12/25 119 kg (263 lb)   02/07/25 117 kg (259 lb)   01/09/25 115 kg (254 lb)       CURRENT HOSPITAL MEDICATIONS:   Scheduled Medications[1]  Continuous  Medications[2]  Current Outpatient Medications   Medication Instructions    albuterol 90 mcg/actuation aerosol powdr breath activated inhaler 2 puffs, inhalation, Every 6 hours PRN    apixaban (Eliquis) 5 mg tablet 1 tablet, oral, 2 times daily    aspirin 81 mg chewable tablet 1 tablet, oral, Daily    atorvastatin (LIPITOR) 40 mg, oral, Nightly    budesonide-glycopyr-formoterol (Breztri Aerosphere) 160-9-4.8 mcg/actuation HFA aerosol inhaler 2 puffs, inhalation, 2 times daily    cetirizine (ZYRTEC) 10 mg, oral, Daily    colchicine 0.6 mg tablet Take 2 tablets initially and then 1 tablet one hour later. Then 1 tablet twice daily.    econazole nitrate 1 % cream Topical, 2 times daily PRN    fluticasone (Flonase) 50 mcg/actuation nasal spray 2 sprays, Each Nostril, Daily, Shake gently. Before first use, prime pump. After use, clean tip and replace cap.    furosemide (LASIX) 20 mg, oral, Daily, If weight up greater than 5 pounds take 1 additional tablet    lidocaine (LMX) 4 % cream 1 Application, Topical, Daily PRN    lisinopril 10 mg, oral, Daily    lisinopril 5 mg, oral, Daily    metoprolol succinate XL (TOPROL-XL) 50 mg, oral, Daily, Do not crush or chew.    nitroglycerin (Nitrostat) 0.4 mg SL tablet Place 1 tablet under the tongue every 5 minutes as needed for Chest pain. May repeat up to 3 times. Call 911 if pain persists.    omeprazole OTC (PriLOSEC OTC) 20 mg EC tablet 1 tablet, oral, Daily before breakfast    sodium chloride (Ocean) 0.65 % nasal spray 1 spray, Each Nostril, 4 times daily PRN    sotalol (BETAPACE) 120 mg, oral, 2 times daily    spironolactone (Aldactone) 25 mg tablet 1 tablet, oral, Daily    tadalafil 20 mg, oral, Daily PRN    white petrolatum-mineral oiL (Tears Naturale PM) 94-3 % ophthalmic ointment 1 Application, Both Eyes, Nightly        PHYSICAL EXAMINATION:   GENERAL APPEARANCE: Well developed, well nourished, in no acute distress.  CHEST: Symmetric and non-tender.  INTEGUMENT: Skin warm  and dry, without gross excoriationis or lesions.  HEENT: No gross abnormalities of conjunctiva, teeth, gums, oral mucosa  NECK: Supple, no JVD, no bruit. Thyroid not palpable. Carotid upstrokes normal.  NEURO/PSHCY: Alert and oriented x3; appropriate behavior and responses and responses, grossly normal cerebellar function with normal balance and coordination  LUNGS: scattered rales  HEART: S1, S2 regular with 1 out of 6 systolic murmur  ABDOMEN: Soft, nontender, no palpable hepatosplenomegaly, no mases, no bruits. Abdominal aorta not noted to be enlarged.  MUSCULOSKELETAL: Ambulatory with normal tandem gait.  EXTREMITIES: Warm with good color, no clubbing or cyanois. Trace edema much improved  PERIPHERAL VASCULAR: Pulses present and equally palpable; 1+ throughout. No femoral bruits.      LAB DATA:     CBC:   Results from last 7 days   Lab Units 07/03/25  0544 07/02/25  0558 07/01/25  1011   WBC AUTO x10*3/uL 12.9* 11.5* 9.3   RBC AUTO x10*6/uL 5.64 5.43 5.38   HEMOGLOBIN g/dL 14.0 13.3* 13.3*   HEMATOCRIT % 43.8 42.3 43.1   MCV fL 78* 78* 80   MCH pg 24.8* 24.5* 24.7*   MCHC g/dL 32.0 31.4* 30.9*   RDW % 15.2* 15.3* 15.2*   PLATELETS AUTO x10*3/uL 276 301 272     CMP:    Results from last 7 days   Lab Units 07/03/25  0544 07/02/25  0559 07/01/25  1011   SODIUM mmol/L 138 140 140   POTASSIUM mmol/L 4.1 3.9 4.4   CHLORIDE mmol/L 99 100 105   CO2 mmol/L 31 31 30   BUN mg/dL 26* 20 15   CREATININE mg/dL 1.08 1.01 0.86   GLUCOSE mg/dL 127* 112* 115*   PROTEIN TOTAL g/dL  --   --  7.0   CALCIUM mg/dL 9.5 9.3 9.0   BILIRUBIN TOTAL mg/dL  --   --  1.3*   ALK PHOS U/L  --   --  88   AST U/L  --   --  20   ALT U/L  --   --  22     BMP:    Results from last 7 days   Lab Units 07/03/25  0544 07/02/25  0559 07/01/25  1011   SODIUM mmol/L 138 140 140   POTASSIUM mmol/L 4.1 3.9 4.4   CHLORIDE mmol/L 99 100 105   CO2 mmol/L 31 31 30   BUN mg/dL 26* 20 15   CREATININE mg/dL 1.08 1.01 0.86   CALCIUM mg/dL 9.5 9.3 9.0   GLUCOSE mg/dL  127* 112* 115*     Magnesium:  Results from last 7 days   Lab Units 07/01/25  1011   MAGNESIUM mg/dL 1.90     Troponin:    Results from last 7 days   Lab Units 07/01/25  1054 07/01/25  1011   TROPHS ng/L 9 9     BNP:   Results from last 7 days   Lab Units 07/01/25  1011   BNP pg/mL 358*     Lipid Panel:         DIAGNOSTIC TESTING:   @No results found for this or any previous visit.    XR chest 1 view  Result Date: 7/1/2025  Interpreted By:  Juice Burch, STUDY: XR CHEST 1 VIEW   INDICATION: Signs/Symptoms:CHF.   COMPARISON: August 5   ACCESSION NUMBER(S): [id number]   ORDERING CLINICIAN: SANAM DE LEON   FINDINGS: Cardiomegaly unchanged. No consolidation or edema. No effusion.       No evidence of acute intrathoracic abnormality.   Signed by: Juice Burch 7/1/2025 10:47 AM Dictation workstation:   [id number]    ECG 12 lead  Result Date: 7/1/2025  Sinus bradycardia Otherwise normal ECG When compared with ECG of 31-DEC-2024 15:12, No significant change was found       XR chest 1 view   Final Result   No evidence of acute intrathoracic abnormality.        Signed by: Juice Burch 7/1/2025 10:47 AM   Dictation workstation:   [id number]          No echocardiogram results found for the past 14 days    RADIOLOGY:     XR chest 1 view   Final Result   No evidence of acute intrathoracic abnormality.        Signed by: Juice Burch 7/1/2025 10:47 AM   Dictation workstation:   [id number]          PROBLEM LIST   Problem List[3]    ASSESSMENT:   Acute on chronic diastolic heart failure NYHA class II  PAF now normal sinus rhythm  History of CAD  Hypertension  Dyslipidemia  EF 60%    PLAN:   Tele monitoring  Serial enzymes  Daily EKGs  Echo done 12/24 EF normal  Aspirin 81 mg daily  Eliquis 5 mg p.o. twice daily  Lipitor 40 mg daily  Decrease lisinopril to 5 mg daily  Toprol XL 50 mg daily  Sotalol 120 mg p.o. twice daily  Aldactone 25 mg daily  Lasix 20 mg IV push every 12  Patient has an allergy to Jardiance  Strict intake and  output  Daily weights  Knee-high SELAM hose  Keep magnesium greater than 2.0  Keep potassium tween 4.0-4.5     Thien Lopez CNP  [facility]     Of note, this documentation is completed using the Dragon Dictation system (voice recognition software). There may be spelling and/or grammatical errors that were not corrected prior to final submission.     Electronically signed by VANESSA Lomeli-CNP, on 7/2/2025 at 9:32 AM     I have personally interviewed and examined the patient.   I have personally and independently reviewed labs and diagnostic testing.  I have personally verified the elements of the history and physical listed above and changes, if any, are noted.   I have personally reviewed the assessment and plan as documented by PANCHO Neal, CNP and concur.     In summary, Mr. Elroy Edmonds has a history of atherosclerotic disease with previous angioplasty and stenting of the mid LAD and angioplasty for Syonell in April 2017.  He has a history of moderate aortic stenosis and chronic diastolic congestive heart failure.  He has a history of hyperlipidemia, hypertension, and COPD.  He has a history of paroxysmal atrial fibrillation which has been treated with sotalol.  He is followed on the basis by Dr. Law Luo and Dr. Cheng Simpson.  A Holter monitor earlier this year showed normal sinus rhythm with a 5-hour episode of atrial fibrillation.  He is chronically anticoagulated with Eliquis.  Most recent echocardiogram December 30, 2024 showed an estimated LV ejection action 60%.  Mild mitral and tricuspid regurgitation along with moderate aortic stenosis.     He presented to [facility] department yesterday morning with complaints of worsening shortness of breath over the past few weeks.  This has progressed to point where he can no longer lie flat.  He gets winded after walking just a few steps.  He has noted waking up approximately 15 pounds.   He reports intermittent episodes of palpitations.  No chest discomfort.  Upon arrival he was noted to have stable vital signs with exception of blood pressure 190/88 mm.  And oxygen saturation 93%.  Heart rates in the 50s.  Lab studies show a hemoglobin 13.3 and WBC 9.3.  Basic metabolic profile normal.  BNP level 358.  High-sensitivity troponin were 9 and 9.  Magnesium 1.90.  EKG shows sinus bradycardia with heart rate 52 beats per minute.  QTc 455 ms.  Chest x-ray did not show any active disease.  He was admitted telemetry with a diagnosis of acute diastolic congestive heart failure.  He has been initiated on intravenous furosemide.  Overnight he had 2.5 L negative fluid balance.  He currently is feeling better.  Cardiac rhythm is regular.  Lungs have a few bilateral basilar rales.  1+ bilateral peripheral edema.  He just received another dose of IV furosemide.  He is intolerant to Jardiance.  Continue aspirin, lisinopril, Toprol XL, sotalol, and Aldactone.   Patient notes his preference to be discharged later today or first thing tomorrow morning.  He states he is hoping to get a hip injection after his appointment with Dr. Diaz tomorrow.  He is planning to go on an [place] cruise in 2 weeks.  Follow-up with Dr. Luo and Dr. Simpson as scheduled.    7/3/25  Okay to discharge home  Eliquis 5 mg p.o. twice daily  Aspirin 81 mg daily  Lipitor 40 mg daily  Toprol XL 50 mg daily  Sotalol 120 mg p.o. twice daily  Aldactone 25 mg daily  Lasix 20 mg daily if weight up greater than 5 pounds will take 1 additional tablet  Check a BMP in 1 week    45 minutes    Thien Lopez CNP  [facility]      Of note, this documentation is completed using the Dragon Dictation system (voice recognition software). There may be spelling and/or grammatical errors that were not corrected prior to final submission.    Please do not hesitate to call with questions.  Electronically  signed by Navjot Lopez, APRN-CNP, on 7/3/2025 at 7:56 AM       [1] apixaban, 5 mg, oral, BID  aspirin, 81 mg, oral, Daily  atorvastatin, 40 mg, oral, Nightly  furosemide, 20 mg, intravenous, q12h  insulin lispro, 0-10 Units, subcutaneous, TID AC  lisinopril, 5 mg, oral, Daily  metoprolol succinate XL, 50 mg, oral, Nightly  pantoprazole, 40 mg, oral, Daily before breakfast  polyethylene glycol, 17 g, oral, Daily  sotalol, 120 mg, oral, BID  spironolactone, 25 mg, oral, Daily    [2]    [3]   Patient Active Problem List  Diagnosis    Age-related nuclear cataract of left eye    Angina, class IV    Aortic stenosis    Arcus senilis, bilateral    Arteriosclerosis of abdominal aorta    Ascending aorta enlargement    Benign essential hypertension    Cataract of left eye    CAD S/P percutaneous coronary angioplasty    Blepharitis with rosacea    Chronic otitis externa    Corneal scars, both eyes    COVID-19    Dermatochalasis of both eyelids    Dysphagia    Dry eye syndrome    DNS (deviated nasal septum)    Fatigue    Erectile dysfunction due to arterial insufficiency    Dyspnea on exertion    Mixed hyperlipidemia    Keratoconjunctivitis sicca    Hypertrophy of inferior nasal turbinate    Pain in thoracic spine    Pain in periorbital region of right eye    Otorrhea of right ear    Nontraumatic incomplete tear of left rotator cuff    Paroxysmal atrial fibrillation (Multi)    Pseudophakia of right eye    Pulmonary hypertension (Multi)    Right renal mass    Skin lesion of neck    Zenker's diverticulum    Systolic ejection murmur    Subconjunctival hemorrhage of right eye    Sleep dysfunction with arousal disturbance    Class 2 obesity due to excess calories without serious comorbidity with body mass index (BMI) of 39.0 to 39.9 in adult    Bilateral sensorineural hearing loss    Controlled type 2 diabetes mellitus without complication, without long-term current use of insulin    Iron deficiency    Former cigarette smoker     BMI 37.0-37.9, adult    Chronic respiratory failure with hypoxia    Acute on chronic congestive heart failure, unspecified heart failure type

## 2025-07-03 NOTE — DISCHARGE SUMMARY
Discharge Diagnosis  Acute on chronic congestive heart failure, unspecified heart failure type    Issues Requiring Follow-Up  Follow-up with cardiology    Discharge Meds     Your medication list        START taking these medications        Instructions Last Dose Given Next Dose Due   furosemide 20 mg tablet  Commonly known as: Lasix      Take 1 tablet (20 mg) by mouth once daily. If weight up greater than 5 pounds take 1 additional tablet              CHANGE how you take these medications        Instructions Last Dose Given Next Dose Due   lisinopril 5 mg tablet  What changed:   medication strength  how much to take      Take 1 tablet (5 mg) by mouth once daily.              CONTINUE taking these medications        Instructions Last Dose Given Next Dose Due   albuterol 90 mcg/actuation aerosol powdr breath activated inhaler           apixaban 5 mg tablet  Commonly known as: Eliquis           aspirin 81 mg chewable tablet           atorvastatin 40 mg tablet  Commonly known as: Lipitor           Breztri Aerosphere 160-9-4.8 mcg/actuation HFA aerosol inhaler  Generic drug: budesonide-glycopyr-formoterol           cetirizine 10 mg chewable tablet  Commonly known as: ZyrTEC           colchicine 0.6 mg tablet      Take 2 tablets initially and then 1 tablet one hour later. Then 1 tablet twice daily.       econazole nitrate 1 % cream      Apply topically 2 times a day as needed for irritation or rash.       fluticasone 50 mcg/actuation nasal spray  Commonly known as: Flonase           lidocaine 4 % cream  Commonly known as: LMX           metoprolol succinate XL 50 mg 24 hr tablet  Commonly known as: Toprol-XL           nitroglycerin 0.4 mg SL tablet  Commonly known as: Nitrostat      Place 1 tablet under the tongue every 5 minutes as needed for Chest pain. May repeat up to 3 times. Call 911 if pain persists.       omeprazole OTC 20 mg EC tablet  Commonly known as: PriLOSEC OTC           sodium chloride 0.65 % nasal  "spray  Commonly known as: Ocean           sotalol 120 mg tablet  Commonly known as: Betapace      Take 1 tablet (120 mg) by mouth 2 times a day.       spironolactone 25 mg tablet  Commonly known as: Aldactone           tadalafil 20 mg tablet      Take 1 tablet (20 mg) by mouth once daily as needed for erectile dysfunction.       white petrolatum-mineral oiL 94-3 % ophthalmic ointment  Commonly known as: Tears Naturale PM                     Where to Get Your Medications        These medications were sent to [id number] - [place], [place] - [address]  [address] [address]      Phone: [phone]   furosemide 20 mg tablet  lisinopril 5 mg tablet         Test Results Pending At Discharge  Pending Labs       Order Current Status    Extra Tubes Preliminary result    SST TOP Preliminary result            Last Vitals  Vitals:    07/02/25 1956 07/03/25 0425 07/03/25 0600 07/03/25 0811   BP: 115/64 105/74  102/55   BP Location: Right arm Right arm     Patient Position: Sitting Sitting     Pulse: 50 74  58   Resp: 18 16     Temp: 37 °C (98.6 °F) 36.3 °C (97.3 °F)  35.2 °C (95.4 °F)   TempSrc: Temporal Temporal     SpO2: 93% 96%  95%   Weight:   119 kg (261 lb 14.5 oz)    Height:           Hospital Course   Elroy Edmonds \"Len\" is a 75 y.o. male with PMH A-fib on Eliquis, COPD, T2DM, hypertension, and HLD who is presenting with presenting with worsening shortness of breath.  Patient states he has noticed worsening shortness of breath over the past couple weeks.  Patient follows cardiology as outpatient.  Patient states he was taking his blood pressure twice a day and noticed it was in the 100s called his doctor and cut back on some of his BP meds.  Yesterday he did not feel well and took his blood pressure and it was elevated so he took an additional dose of his medication.  This morning blood pressure was 180 and he was worsening shortness of breath unable to ambulate 10 " feet.  Patient called his physician and was instructed to go to the emergency room.  Patient denies chest pain.  Denies abdominal pain, nausea, vomiting or diarrhea.  Per his wife he noticed that his abdomen was slightly distended prior to coming to the emergency room.  Denies fever or chills.   troponins negative.  CXR shows cardiomegaly unchanged.  No consolidation or edema.  Patient was given 40 mg Lasix in the emergency room with not much improvement.  Patient will be admitted for further evaluation and treatment.  Patient continued on Lasix and showed much improvement and was able to ambulate greater than 10 feet.  Patient was seen by cardiology and started on Lasix daily.  Patient was seen by heart failure navigator and educated on diet.  On day of discharge patient hemodynamically stable.    Pertinent Physical Exam At Time of Discharge  Physical Exam  Constitutional:       Appearance: Normal appearance.   HENT:      Head: Normocephalic.      Mouth/Throat:      Mouth: Mucous membranes are moist.   Eyes:      Pupils: Pupils are equal, round, and reactive to light.   Cardiovascular:      Rate and Rhythm: Normal rate and regular rhythm.      Heart sounds: Normal heart sounds, S1 normal and S2 normal.   Pulmonary:      Effort: Pulmonary effort is normal.      Breath sounds: Normal breath sounds.   Abdominal:      General: Bowel sounds are normal.      Palpations: Abdomen is soft.   Musculoskeletal:         General: Normal range of motion.      Cervical back: Neck supple.   Skin:     General: Skin is warm.   Neurological:      Mental Status: He is alert and oriented to person, place, and time.   Psychiatric:         Mood and Affect: Mood normal.         Behavior: Behavior normal.         Outpatient Follow-Up  Future Appointments   Date Time Provider Department [place]   7/3/2025  1:45 PM Ben Diaz MD [id number] [place]   7/17/2025 12:30 PM VANESSA Lomeli-CNP [id number] [place]   8/6/2025 10:30 AM  Cheng Simpson MD [id number] [place]   1/5/2026 12:00 PM LYLE [id number] ECHO/VASC 3 [id number] [place]   1/12/2026  2:45 PM Law Luo MD [id number] [place]         Erika Donahue, APRN-CNP

## 2025-07-03 NOTE — NURSING NOTE
AVS printed and reviewed with patient. All belonging sent with patient. New medication discussed with patient.  IV was already removed.

## 2025-07-03 NOTE — PROGRESS NOTES
Subjective   Patient ID: Elroy Edmonds is a 75 y.o. male who presents for Follow-Up on Diabetes, Hyperlipidemia, and Hypertension. I last saw the patient on 3/12/2025.     HPI  Patient was seen in the ER for CHF. Patient states prior he was having issues catching his breath before he went to the ER. States he feels much better now. Patient was eating salty popcorn nightly and putting salt on his watermelon which caused his acute CHF. He is doing better with watching his salt and is taking Lasix 20mg daily. He is withholding his lisinopril unless is SBP is greater than 130.     Patient looking for cortisone shot in both hips =, if not in right hip. States he has previously gotten a shot in the hip while in clinic.     Patient states he is going on a cruise in two weeks.       Past medical, surgical, and family history reviewed.  Reviewed and documented all medications   Pt eating well, exercising as tolerated and taking medications as directed.      Review of Systems  Except positives as noted in the CC & HPI      Constitutional: Denies fevers, chills, night sweats, fatigue, weight changes, change in appetite    Eyes: Denies blurry vision, double vision    ENT: Denies otalgia, trouble hearing, tinnitus, vertigo, nasal congestion, rhinorrhea, sore throat    Neck: Denies swelling, masses    Cardiovascular: Denies chest pain, palpitations, edema, orthopnea, syncope    Respiratory: Denies dyspnea, cough, wheezing, postural nocturnal dyspnea    Gastrointestinal: Denies abdominal pain, nausea, vomiting, diarrhea, constipation, melena, hematochezia    Genitourinary: Denies dysuria, hematuria  Musculoskeletal: Denies back pain, neck pain, arthralgias, myalgias    Integumentary: Denies skin lesions, rashes, masses    Neurological: Denies dizziness, headaches, confusion, limb weakness, paresthesias, syncope, convulsions    Psychiatric: Denies depression, anxiety, homicidal ideations, suicidal ideations, sleep disturbances   "  Endocrine: Denies polyphagia, polydipsia, polyuria, weakness, hair thinning, heat intolerance, cold intolerance, weight changes    Heme/Lymph: Denies easy bruising, easy bleeding, swollen glands    Objective   Visit Vitals  /66   Pulse 88   Temp 36.7 °C (98 °F)   Resp 16   Wt 119 kg (263 lb)   SpO2 96%   BMI 37.74 kg/m²   Smoking Status Former   BSA 2.42 m²       Physical Exam    Gen. Appearance - well-developed, well-nourished in no acute distress.     Skin - warm and dry without rash or concerning lesions.     Mental Status - alert and oriented times 3. Normal mood and affect appropriate to mood.     Neck - supple without lymphadenopathy. Carotid pulses are normal without bruits. Thyroid is normal in midline without nodules.    Chest - lungs are clear to auscultation without rales, rhonchi or wheezes.     Heart - regular, rate, and rhythm without murmurs, rubs or gallops.     Abdomen - soft, flat, nondistended, nontender. No masses, hepatomegaly or splenomegaly is noted. No rebound, rigidity or guarding is noted. Bowel sounds are normoactive.     Extremities - no cyanosis, clubbing. Trace ankle edema bilaterally.Pedal pulses are 2+ normal at the dorsalis pedis and posterior tibial pulses bilaterally. He is wearing SELAM hose bilaterally.    Hips - pain to palpation over the greater trochanter bilaterally R>L    Neurological - cranial nerves II through XII are grossly intact. Motor strength 5/5 at all fours.     Patient ID: Elroy Edmonds \"Len\" is a 75 y.o. male.    Joint Injection Large/Arthrocentesis: bilateral greater trochanteric bursa on 7/3/2025 3:01 PM  Indications: pain  Details: 22 G needle, lateral approach  Medications (Right): 1 mL lidocaine PF 20 mg/mL (2 %); 20 mg methylPREDNISolone acetate 40 mg/mL  Medications (Left): 20 mg methylPREDNISolone acetate 40 mg/mL; 1 mL lidocaine PF 20 mg/mL (2 %)  Outcome: tolerated well, no immediate complications  Procedure, treatment alternatives, risks and " benefits explained, specific risks discussed. Consent was given by the patient. Immediately prior to procedure a time out was called to verify the correct patient, procedure, equipment, support staff and site/side marked as required. Patient was prepped and draped in the usual sterile fashion.           Assessment   1. Hospital discharge follow-up        2. Acute on chronic congestive heart failure, unspecified heart failure type  Referral to Clinical Pharmacy      3. Mixed hyperlipidemia        4. Controlled type 2 diabetes mellitus without complication, without long-term current use of insulin  Referral to Clinical Pharmacy      5. Pulmonary hypertension (Multi)        6. Trochanteric bursitis of both hips  Joint Injection Large/Arthrocentesis: bilateral greater trochanteric bursa      7. Benign essential hypertension        8. Class 2 obesity due to excess calories without serious comorbidity with body mass index (BMI) of 37.0 to 37.9 in adult        9. Morbid (severe) obesity due to excess calories (Multi)            Patient to continue current medications (with any exceptions as noted) and diet.     Follow up with cardiology on 07/11 as scheduled.     Patient was advised to limit their salt intake and to not add any extra salt to their food. Patient is to avoid pretzels, chips, lunch meats, canned soups, soda pop, ham, peters, hot dogs, etc. He is following a 2g sodium diet.     Follow-up in 3-6 month(s) otherwise as needed.        All medical record entries made by the scribe were at my direction and personally dictated by me. I have reviewed the chart and agree that the record accurately reflects my personal performance of the history, physical exam, discussion, and plan.    Ben Diaz M.D.        Ke Attestation  By signing my name below, I, Ke Peterson   attest that this documentation has been prepared under the direction and in the presence of Ben Diaz MD.

## 2025-07-03 NOTE — TELEPHONE ENCOUNTER
"Patient called to schedule a hospital follow up appointment. I let him know we scheduled him a follow up with Thien per a message we had received from Thien. Patient stated he would like to see a \"real doctor\" but states that is only because he may want a heart cath done? Patient requested to see either Dr. Manzano or Dr. Rascon. I scheduled with Dr. Manzano as Dr. TALAMANTES's schedule is full.   "

## 2025-07-03 NOTE — CARE PLAN
The patient's goals for the shift include rest     The clinical goals for the shift include decrease SOB

## 2025-07-07 ENCOUNTER — PATIENT OUTREACH (OUTPATIENT)
Dept: CARDIOLOGY | Facility: CLINIC | Age: 75
End: 2025-07-07
Payer: MEDICARE

## 2025-07-07 LAB
ATRIAL RATE: 61 BPM
P AXIS: 27 DEGREES
P OFFSET: 200 MS
P ONSET: 143 MS
PR INTERVAL: 166 MS
Q ONSET: 226 MS
QRS COUNT: 10 BEATS
QRS DURATION: 106 MS
QT INTERVAL: 454 MS
QTC CALCULATION(BAZETT): 457 MS
QTC FREDERICIA: 456 MS
R AXIS: -29 DEGREES
T AXIS: 57 DEGREES
T OFFSET: 453 MS
VENTRICULAR RATE: 61 BPM

## 2025-07-07 NOTE — PROGRESS NOTES
Discharge Facility:  [facility]  Discharge Diagnosis:  Acute on chronic congestive heart failure,  Admission Date:  7/1/25   Discharge Date: 7/3/25     PCP Appointment Date:  7/3/25   Specialist Appointment Date:  7/11/25   Hospital Encounter and Summary Linked: Yes    ED to Hosp-Admission (Discharged) with Jalli Snell MD; Kev Baum MD (07/01/2025)       Wrap Up  Wrap Up Additional Comments: Successful transition of care outreach with patient. Pt reports doing well at home since discharge. New meds reviewed. Pt denies CP and SOB. Patient denies any further discharge questions/needs at this time. Pt aware of my availability for non-emergent concerns. Contact info provided to patient (7/7/2025  2:41 PM)    Medications  Medications reviewed with patient/caregiver?: Yes (7/7/2025  2:41 PM)  Is the patient having any side effects they believe may be caused by any medication additions or changes?: No (7/7/2025  2:41 PM)  Does the patient have all medications ordered at discharge?: Yes (7/7/2025  2:41 PM)  Prescription Comments: New scripts given at discharge :    furosemide 20 mg tablet  CHANGE  lisinopril 5 mg tablet (7/7/2025  2:41 PM)  Is the patient taking all medications as directed (includes completed medication regime)?: Yes (7/7/2025  2:41 PM)  Care Management Interventions: Provided patient education (7/7/2025  2:41 PM)  Medication Comments: Pt denies problems obtaining or affording medication. No medication-related issues identified (7/7/2025  2:41 PM)    Appointments  Does the patient have a primary care provider?: Yes (7/7/2025  2:41 PM)  Care Management Interventions: Verified appointment date/time/provider (7/7/2025  2:41 PM)  Has the patient kept scheduled appointments due by today?: Yes (7/7/2025  2:41 PM)  Care Management Interventions: Advised patient to keep appointment (7/7/2025  2:41 PM)    Self Management  What is the home health agency?: DENIES NEED (7/7/2025  2:41  PM)  What Durable Medical Equipment (DME) was ordered?: N/A (7/7/2025  2:41 PM)    Patient Teaching  Does the patient have access to their discharge instructions?: Yes (7/7/2025  2:41 PM)  Care Management Interventions: Reviewed instructions with patient (7/7/2025  2:41 PM)  What is the patient's perception of their health status since discharge?: Improving (7/7/2025  2:41 PM)  Is the patient/caregiver able to teach back the hierarchy of who to call/visit for symptoms/problems? PCP, Specialist, Home Health nurse, Urgent Care, ED, 911: Yes (7/7/2025  2:41 PM)

## 2025-07-10 DIAGNOSIS — I50.9 ACUTE ON CHRONIC CONGESTIVE HEART FAILURE, UNSPECIFIED HEART FAILURE TYPE: ICD-10-CM

## 2025-07-11 ENCOUNTER — APPOINTMENT (OUTPATIENT)
Dept: CARDIOLOGY | Facility: CLINIC | Age: 75
End: 2025-07-11
Payer: MEDICARE

## 2025-07-11 VITALS
DIASTOLIC BLOOD PRESSURE: 70 MMHG | WEIGHT: 257 LBS | HEART RATE: 60 BPM | BODY MASS INDEX: 36.79 KG/M2 | SYSTOLIC BLOOD PRESSURE: 104 MMHG | HEIGHT: 70 IN

## 2025-07-11 DIAGNOSIS — I35.0 MODERATE AORTIC STENOSIS: ICD-10-CM

## 2025-07-11 DIAGNOSIS — E78.2 MIXED HYPERLIPIDEMIA: ICD-10-CM

## 2025-07-11 DIAGNOSIS — E11.9 CONTROLLED TYPE 2 DIABETES MELLITUS WITHOUT COMPLICATION, WITHOUT LONG-TERM CURRENT USE OF INSULIN: ICD-10-CM

## 2025-07-11 DIAGNOSIS — Z87.891 FORMER CIGARETTE SMOKER: ICD-10-CM

## 2025-07-11 DIAGNOSIS — I77.89 ASCENDING AORTA ENLARGEMENT: ICD-10-CM

## 2025-07-11 DIAGNOSIS — I27.20 PULMONARY HYPERTENSION (MULTI): ICD-10-CM

## 2025-07-11 DIAGNOSIS — I25.10 CAD S/P PERCUTANEOUS CORONARY ANGIOPLASTY: Primary | ICD-10-CM

## 2025-07-11 DIAGNOSIS — I48.0 PAROXYSMAL ATRIAL FIBRILLATION (MULTI): ICD-10-CM

## 2025-07-11 DIAGNOSIS — I10 BENIGN ESSENTIAL HYPERTENSION: ICD-10-CM

## 2025-07-11 DIAGNOSIS — R53.83 OTHER FATIGUE: ICD-10-CM

## 2025-07-11 DIAGNOSIS — Z98.61 CAD S/P PERCUTANEOUS CORONARY ANGIOPLASTY: Primary | ICD-10-CM

## 2025-07-11 PROBLEM — E66.01 MORBID (SEVERE) OBESITY DUE TO EXCESS CALORIES (MULTI): Status: RESOLVED | Noted: 2025-07-03 | Resolved: 2025-07-11

## 2025-07-11 PROBLEM — E66.812 CLASS 2 OBESITY DUE TO EXCESS CALORIES WITHOUT SERIOUS COMORBIDITY WITH BODY MASS INDEX (BMI) OF 37.0 TO 37.9 IN ADULT: Status: RESOLVED | Noted: 2023-03-03 | Resolved: 2025-07-11

## 2025-07-11 PROBLEM — E66.09 CLASS 2 OBESITY DUE TO EXCESS CALORIES WITHOUT SERIOUS COMORBIDITY WITH BODY MASS INDEX (BMI) OF 37.0 TO 37.9 IN ADULT: Status: RESOLVED | Noted: 2023-03-03 | Resolved: 2025-07-11

## 2025-07-11 NOTE — PROGRESS NOTES
Referred by Dr. Tucker ref. provider found provider found for   Chief Complaint   Patient presents with    Hospital Follow-up     TCM 2 weeks  CHF          History of Present Illness  Elroy Edmonds is a 75 y.o. year old male patient is here for follow-up following discharge from the hospital.  Apparently was admitted with congestive heart failure.  Was in sinus rhythm.  Diabetes.  Did well his weight has been stable since discharge.  He is taking his diuretics.  Blood pressure is adequately controlled however he is known to have at least moderate aortic stenosis with a peak to peak gradient was 44 mmHg.  I had a lengthy discussion with the patient today that very likely his heart failure was related to his aortic stenosis.  He will need right and left heart cath possible TAVR.  Patient is also interested in Watchman device since he had paroxysmal atrial fibrillation and taking Eliquis but at this point told I told him that probably TAVR would be more important.  He will hold his Eliquis 2 days prior to procedure.  Will set him up to have the procedure done by Dr. Luo his primary cardiologist    Past Medical History  Medical History[1]    Social History  Social History[2]    Family History   Family History[3]    Review of Systems  As per HPI, all other systems reviewed and negative.    Allergies:  RX Allergies[4]     Outpatient Medications:  Current Outpatient Medications   Medication Instructions    albuterol 90 mcg/actuation aerosol powdr breath activated inhaler 2 puffs, Every 6 hours PRN    apixaban (Eliquis) 5 mg tablet 1 tablet, 2 times daily    aspirin 81 mg chewable tablet 1 tablet, Daily    atorvastatin (LIPITOR) 40 mg, Nightly    budesonide-glycopyr-formoterol (Breztri Aerosphere) 160-9-4.8 mcg/actuation HFA aerosol inhaler 2 puffs, 2 times daily    cetirizine (ZYRTEC) 10 mg, Daily    fluticasone (Flonase) 50 mcg/actuation nasal spray 2 sprays, Daily    furosemide (LASIX) 20 mg, oral, Daily, If  weight up greater than 5 pounds take 1 additional tablet    lidocaine (LMX) 4 % cream 1 Application, Daily PRN    lisinopril 5 mg, oral, Daily    metoprolol succinate XL (TOPROL-XL) 50 mg, Daily    nitroglycerin (Nitrostat) 0.4 mg SL tablet Place 1 tablet under the tongue every 5 minutes as needed for Chest pain. May repeat up to 3 times. Call 911 if pain persists.    omeprazole OTC (PriLOSEC OTC) 20 mg EC tablet 1 tablet, Daily before breakfast    sodium chloride (Ocean) 0.65 % nasal spray 1 spray, 4 times daily PRN    sotalol (BETAPACE) 120 mg, oral, 2 times daily    spironolactone (Aldactone) 25 mg tablet 1 tablet, Daily    tadalafil 20 mg, oral, Daily PRN    white petrolatum-mineral oiL (Tears Naturale PM) 94-3 % ophthalmic ointment 1 Application, Nightly         Vitals:  Vitals:    25 1404   BP: 104/70   Pulse: 60       Physical Exam:  Physical Exam  Constitutional:       Appearance: Normal appearance.   HENT:      Head: Normocephalic and atraumatic.   Eyes:      Extraocular Movements: Extraocular movements intact.      Pupils: Pupils are equal, round, and reactive to light.   Cardiovascular:      Rate and Rhythm: Normal rate and regular rhythm.      Pulses: Normal pulses.      Heart sounds: Murmur heard.   Pulmonary:      Effort: Pulmonary effort is normal.      Breath sounds: Normal breath sounds.   Abdominal:      General: Abdomen is flat.      Palpations: Abdomen is soft.   Musculoskeletal:      Right lower le+ Edema present.      Left lower le+ Edema present.   Skin:     General: Skin is warm and dry.   Neurological:      General: No focal deficit present.      Mental Status: He is alert and oriented to person, place, and time.             Assessment/Plan   Diagnoses and all orders for this visit:  CAD S/P percutaneous coronary angioplasty  Benign essential hypertension  Moderate aortic stenosis  Paroxysmal atrial fibrillation (Multi)  Pulmonary hypertension (Multi)  Mixed  hyperlipidemia  Ascending aorta enlargement  BMI 36.0-36.9,adult  Controlled type 2 diabetes mellitus without complication, without long-term current use of insulin  Other fatigue  Former cigarette smoker          I,Zay Turner RN   am scribing for, and in the presence of Dr. So Manzano MD [facility] .    I, Dr. So Manzano MD [facility] , personally performed the services described in the documentation as scribed by Zay Turner RN   in my presence, and confirm it is both accurate and complete.      So Manzano MD [facility]  Interventional Cardiology  Thank you for allowing me to participate in the care of this patient. Please do not hesitate to contact me with any further questions or concerns.         [1]   Past Medical History:  Diagnosis Date    Arcus senilis, bilateral 07/17/2018    Corneal arcus senilis, bilateral    Arcus senilis, bilateral 07/17/2018    Corneal arcus senilis, bilateral    Atrial fibrillation (Multi)     Combined forms of age-related cataract, bilateral 07/17/2018    Combined form of age-related cataract, both eyes    COPD (chronic obstructive pulmonary disease) (Multi)     Coronary artery disease     Dry eye syndrome of unspecified lacrimal gland 07/17/2018    Dry eye syndrome    Dry eye syndrome of unspecified lacrimal gland 07/17/2018    Dry eye syndrome    Hyperlipidemia     Hypertension     Personal history of other diseases of the circulatory system 04/20/2016    History of atrial fibrillation    Personal history of other diseases of the musculoskeletal system and connective tissue     History of osteoarthritis    Personal history of other diseases of the nervous system and sense organs 04/20/2016    History of cataract    Presence of intraocular lens 07/17/2018    Pseudophakia of right eye    Presence of intraocular lens 07/17/2018    Pseudophakia of right eye    Strain of muscle(s) and tendon(s) of the rotator cuff of left shoulder, initial encounter 10/22/2019    Traumatic complete tear of  left rotator cuff, initial encounter    Strain of muscle(s) and tendon(s) of the rotator cuff of left shoulder, subsequent encounter 2020    Traumatic complete tear of left rotator cuff, subsequent encounter    Unspecified cataract 2016    Cataract of right eye   [2]   Social History  Tobacco Use    Smoking status: Former     Current packs/day: 0.00     Types: Cigarettes     Quit date:      Years since quittin.5     Passive exposure: Never    Smokeless tobacco: Never   Substance Use Topics    Alcohol use: Not Currently     Comment: quit in     Drug use: Never   [3]   Family History  Problem Relation Name Age of Onset    Hypertension Mother      Atrial fibrillation Mother      Cataracts Mother      Glaucoma Mother      Heart attack Father      Cancer Father     [4]   Allergies  Allergen Reactions    Jardiance [Empagliflozin] Rash     Yeast infection of the foreskin.

## 2025-07-11 NOTE — PATIENT INSTRUCTIONS
Patient to plan Right and Left heart cath with Dr. Law Rascon MD  in near future.   Please HOLD your Eliquis x2 days pre procedure.   All other medications are okay to take the day of procedure, including your Aspirin.   Please complete lab work prior to procedure- orders in system.     I, Zay Turner RN am scribing for and in the presence of Dr. So Manzano MD [facility]   ----------------------------        Pre-Procedure Patient Information    You have been scheduled for: Heart Catheterizations    At: [facility]  With: Dr. Law Luo  Date of procedure: Monday July 28th, 2025    1. Please have transportation to and from the hospital. While you should plan for same-day discharge there is a possibility you will need to stay overnight.    2. You will receive a call from the hospital 24 - 72 hours before your procedure providing you with fasting instructions, procedure location detail, and time of arrival.  If you have not received a call from the hospital by 6 pm the day before your scheduled procedure, please call [phone].    3. Please bring a current list of medications with you to the hospital.      4. Medications to hold:     - If you are on a blood thinner (like Eliquis and Xarelto), please hold for TWO full days before procedure.     - If you are on aspirin, Plavix (Clopidogrel), Effient (Prasugrel), or Brilinta (Ticagrelor), please continue thru day of procedure.       - Otherwise, you may continue your medications in the morning with sips of water.     5. Nothing to eat after midnight before the procedure. OK to take morning medications, with above exceptions, the day of the procedure with a small sip of water.     6. Please bring to our attention if you have any contrast, latex or metal allergies.    7. Please have your blood work as instructed completed at least a day before your procedure.    8. If you have any questions, please contact the office at [phone].

## 2025-07-11 NOTE — H&P (VIEW-ONLY)
Referred by Dr. Tucker ref. provider found provider found for   Chief Complaint   Patient presents with    Hospital Follow-up     TCM 2 weeks  CHF          History of Present Illness  Elroy Edmonds is a 75 y.o. year old male patient is here for follow-up following discharge from the hospital.  Apparently was admitted with congestive heart failure.  Was in sinus rhythm.  Diabetes.  Did well his weight has been stable since discharge.  He is taking his diuretics.  Blood pressure is adequately controlled however he is known to have at least moderate aortic stenosis with a peak to peak gradient was 44 mmHg.  I had a lengthy discussion with the patient today that very likely his heart failure was related to his aortic stenosis.  He will need right and left heart cath possible TAVR.  Patient is also interested in Watchman device since he had paroxysmal atrial fibrillation and taking Eliquis but at this point told I told him that probably TAVR would be more important.  He will hold his Eliquis 2 days prior to procedure.  Will set him up to have the procedure done by Dr. Luo his primary cardiologist    Past Medical History  Medical History[1]    Social History  Social History[2]    Family History   Family History[3]    Review of Systems  As per HPI, all other systems reviewed and negative.    Allergies:  RX Allergies[4]     Outpatient Medications:  Current Outpatient Medications   Medication Instructions    albuterol 90 mcg/actuation aerosol powdr breath activated inhaler 2 puffs, Every 6 hours PRN    apixaban (Eliquis) 5 mg tablet 1 tablet, 2 times daily    aspirin 81 mg chewable tablet 1 tablet, Daily    atorvastatin (LIPITOR) 40 mg, Nightly    budesonide-glycopyr-formoterol (Breztri Aerosphere) 160-9-4.8 mcg/actuation HFA aerosol inhaler 2 puffs, 2 times daily    cetirizine (ZYRTEC) 10 mg, Daily    fluticasone (Flonase) 50 mcg/actuation nasal spray 2 sprays, Daily    furosemide (LASIX) 20 mg, oral, Daily, If  weight up greater than 5 pounds take 1 additional tablet    lidocaine (LMX) 4 % cream 1 Application, Daily PRN    lisinopril 5 mg, oral, Daily    metoprolol succinate XL (TOPROL-XL) 50 mg, Daily    nitroglycerin (Nitrostat) 0.4 mg SL tablet Place 1 tablet under the tongue every 5 minutes as needed for Chest pain. May repeat up to 3 times. Call 911 if pain persists.    omeprazole OTC (PriLOSEC OTC) 20 mg EC tablet 1 tablet, Daily before breakfast    sodium chloride (Ocean) 0.65 % nasal spray 1 spray, 4 times daily PRN    sotalol (BETAPACE) 120 mg, oral, 2 times daily    spironolactone (Aldactone) 25 mg tablet 1 tablet, Daily    tadalafil 20 mg, oral, Daily PRN    white petrolatum-mineral oiL (Tears Naturale PM) 94-3 % ophthalmic ointment 1 Application, Nightly         Vitals:  Vitals:    25 1404   BP: 104/70   Pulse: 60       Physical Exam:  Physical Exam  Constitutional:       Appearance: Normal appearance.   HENT:      Head: Normocephalic and atraumatic.   Eyes:      Extraocular Movements: Extraocular movements intact.      Pupils: Pupils are equal, round, and reactive to light.   Cardiovascular:      Rate and Rhythm: Normal rate and regular rhythm.      Pulses: Normal pulses.      Heart sounds: Murmur heard.   Pulmonary:      Effort: Pulmonary effort is normal.      Breath sounds: Normal breath sounds.   Abdominal:      General: Abdomen is flat.      Palpations: Abdomen is soft.   Musculoskeletal:      Right lower le+ Edema present.      Left lower le+ Edema present.   Skin:     General: Skin is warm and dry.   Neurological:      General: No focal deficit present.      Mental Status: He is alert and oriented to person, place, and time.             Assessment/Plan   Diagnoses and all orders for this visit:  CAD S/P percutaneous coronary angioplasty  Benign essential hypertension  Moderate aortic stenosis  Paroxysmal atrial fibrillation (Multi)  Pulmonary hypertension (Multi)  Mixed  hyperlipidemia  Ascending aorta enlargement  BMI 36.0-36.9,adult  Controlled type 2 diabetes mellitus without complication, without long-term current use of insulin  Other fatigue  Former cigarette smoker          I,Zay Turner RN   am scribing for, and in the presence of Dr. So Manzano MD [facility] .    I, Dr. So Manzano MD [facility] , personally performed the services described in the documentation as scribed by Zay Turner RN   in my presence, and confirm it is both accurate and complete.      So Manzano MD [facility]  Interventional Cardiology  Thank you for allowing me to participate in the care of this patient. Please do not hesitate to contact me with any further questions or concerns.         [1]   Past Medical History:  Diagnosis Date    Arcus senilis, bilateral 07/17/2018    Corneal arcus senilis, bilateral    Arcus senilis, bilateral 07/17/2018    Corneal arcus senilis, bilateral    Atrial fibrillation (Multi)     Combined forms of age-related cataract, bilateral 07/17/2018    Combined form of age-related cataract, both eyes    COPD (chronic obstructive pulmonary disease) (Multi)     Coronary artery disease     Dry eye syndrome of unspecified lacrimal gland 07/17/2018    Dry eye syndrome    Dry eye syndrome of unspecified lacrimal gland 07/17/2018    Dry eye syndrome    Hyperlipidemia     Hypertension     Personal history of other diseases of the circulatory system 04/20/2016    History of atrial fibrillation    Personal history of other diseases of the musculoskeletal system and connective tissue     History of osteoarthritis    Personal history of other diseases of the nervous system and sense organs 04/20/2016    History of cataract    Presence of intraocular lens 07/17/2018    Pseudophakia of right eye    Presence of intraocular lens 07/17/2018    Pseudophakia of right eye    Strain of muscle(s) and tendon(s) of the rotator cuff of left shoulder, initial encounter 10/22/2019    Traumatic complete tear of  left rotator cuff, initial encounter    Strain of muscle(s) and tendon(s) of the rotator cuff of left shoulder, subsequent encounter 2020    Traumatic complete tear of left rotator cuff, subsequent encounter    Unspecified cataract 2016    Cataract of right eye   [2]   Social History  Tobacco Use    Smoking status: Former     Current packs/day: 0.00     Types: Cigarettes     Quit date:      Years since quittin.5     Passive exposure: Never    Smokeless tobacco: Never   Substance Use Topics    Alcohol use: Not Currently     Comment: quit in     Drug use: Never   [3]   Family History  Problem Relation Name Age of Onset    Hypertension Mother      Atrial fibrillation Mother      Cataracts Mother      Glaucoma Mother      Heart attack Father      Cancer Father     [4]   Allergies  Allergen Reactions    Jardiance [Empagliflozin] Rash     Yeast infection of the foreskin.

## 2025-07-17 ENCOUNTER — APPOINTMENT (OUTPATIENT)
Dept: CARDIOLOGY | Facility: CLINIC | Age: 75
End: 2025-07-17
Payer: MEDICARE

## 2025-07-19 LAB
ATRIAL RATE: 52 BPM
P AXIS: 37 DEGREES
P OFFSET: 201 MS
P ONSET: 140 MS
PR INTERVAL: 170 MS
Q ONSET: 225 MS
QRS COUNT: 8 BEATS
QRS DURATION: 94 MS
QT INTERVAL: 490 MS
QTC CALCULATION(BAZETT): 455 MS
QTC FREDERICIA: 467 MS
R AXIS: 0 DEGREES
T AXIS: 63 DEGREES
T OFFSET: 470 MS
VENTRICULAR RATE: 52 BPM

## 2025-07-25 ENCOUNTER — PATIENT OUTREACH (OUTPATIENT)
Dept: PRIMARY CARE | Facility: CLINIC | Age: 75
End: 2025-07-25

## 2025-07-25 ENCOUNTER — APPOINTMENT (OUTPATIENT)
Dept: PHARMACY | Facility: HOSPITAL | Age: 75
End: 2025-07-25
Payer: MEDICARE

## 2025-07-25 DIAGNOSIS — J96.11 CHRONIC RESPIRATORY FAILURE WITH HYPOXIA: Primary | ICD-10-CM

## 2025-07-25 DIAGNOSIS — I50.9 ACUTE ON CHRONIC CONGESTIVE HEART FAILURE, UNSPECIFIED HEART FAILURE TYPE: ICD-10-CM

## 2025-07-25 NOTE — PROGRESS NOTES
Unable to reach patient for follow up call after recent hospitalization.   Left voicemail with call back number for patient to call if needed.

## 2025-07-28 ENCOUNTER — HOSPITAL ENCOUNTER (OUTPATIENT)
Facility: HOSPITAL | Age: 75
Setting detail: OUTPATIENT SURGERY
Discharge: HOME | End: 2025-07-28
Attending: INTERNAL MEDICINE | Admitting: INTERNAL MEDICINE
Payer: MEDICARE

## 2025-07-28 ENCOUNTER — APPOINTMENT (OUTPATIENT)
Dept: CARDIOLOGY | Facility: HOSPITAL | Age: 75
End: 2025-07-28
Payer: MEDICARE

## 2025-07-28 VITALS
WEIGHT: 250.66 LBS | BODY MASS INDEX: 35.89 KG/M2 | TEMPERATURE: 97.5 F | SYSTOLIC BLOOD PRESSURE: 151 MMHG | HEIGHT: 70 IN | OXYGEN SATURATION: 98 % | HEART RATE: 56 BPM | DIASTOLIC BLOOD PRESSURE: 81 MMHG | RESPIRATION RATE: 16 BRPM

## 2025-07-28 DIAGNOSIS — I35.0 NONRHEUMATIC AORTIC (VALVE) STENOSIS: ICD-10-CM

## 2025-07-28 DIAGNOSIS — I35.0 MODERATE AORTIC STENOSIS: ICD-10-CM

## 2025-07-28 DIAGNOSIS — Z98.61 CAD S/P PERCUTANEOUS CORONARY ANGIOPLASTY: Primary | ICD-10-CM

## 2025-07-28 DIAGNOSIS — I25.10 CAD S/P PERCUTANEOUS CORONARY ANGIOPLASTY: Primary | ICD-10-CM

## 2025-07-28 DIAGNOSIS — R53.83 OTHER FATIGUE: ICD-10-CM

## 2025-07-28 DIAGNOSIS — I50.32 CHRONIC DIASTOLIC (CONGESTIVE) HEART FAILURE: ICD-10-CM

## 2025-07-28 DIAGNOSIS — I48.0 PAROXYSMAL ATRIAL FIBRILLATION (MULTI): ICD-10-CM

## 2025-07-28 LAB
ANION GAP SERPL CALC-SCNC: 9 MMOL/L (ref 10–20)
APTT PPP: 28 SECONDS (ref 26–36)
BUN SERPL-MCNC: 24 MG/DL (ref 6–23)
CALCIUM SERPL-MCNC: 9.1 MG/DL (ref 8.6–10.3)
CHLORIDE SERPL-SCNC: 103 MMOL/L (ref 98–107)
CO2 SERPL-SCNC: 28 MMOL/L (ref 21–32)
CREAT SERPL-MCNC: 1.18 MG/DL (ref 0.5–1.3)
EGFRCR SERPLBLD CKD-EPI 2021: 64 ML/MIN/1.73M*2
ERYTHROCYTE [DISTWIDTH] IN BLOOD BY AUTOMATED COUNT: 17.2 % (ref 11.5–14.5)
GLUCOSE SERPL-MCNC: 107 MG/DL (ref 74–99)
HCT VFR BLD AUTO: 40.1 % (ref 41–52)
HGB BLD-MCNC: 13.1 G/DL (ref 13.5–17.5)
INR PPP: 1.1 (ref 0.9–1.1)
MCH RBC QN AUTO: 25.1 PG (ref 26–34)
MCHC RBC AUTO-ENTMCNC: 32.7 G/DL (ref 32–36)
MCV RBC AUTO: 77 FL (ref 80–100)
NRBC BLD-RTO: 0 /100 WBCS (ref 0–0)
PLATELET # BLD AUTO: 271 X10*3/UL (ref 150–450)
POTASSIUM SERPL-SCNC: 4.3 MMOL/L (ref 3.5–5.3)
PROTHROMBIN TIME: 11.7 SECONDS (ref 9.8–12.4)
RBC # BLD AUTO: 5.21 X10*6/UL (ref 4.5–5.9)
SODIUM SERPL-SCNC: 136 MMOL/L (ref 136–145)
WBC # BLD AUTO: 10 X10*3/UL (ref 4.4–11.3)

## 2025-07-28 PROCEDURE — 2550000001 HC RX 255 CONTRASTS: Mod: JW | Performed by: INTERNAL MEDICINE

## 2025-07-28 PROCEDURE — 7100000001 HC RECOVERY ROOM TIME - INITIAL BASE CHARGE: Performed by: INTERNAL MEDICINE

## 2025-07-28 PROCEDURE — 2780000003 HC OR 278 NO HCPCS: Performed by: INTERNAL MEDICINE

## 2025-07-28 PROCEDURE — 7100000002 HC RECOVERY ROOM TIME - EACH INCREMENTAL 1 MINUTE: Performed by: INTERNAL MEDICINE

## 2025-07-28 PROCEDURE — 85730 THROMBOPLASTIN TIME PARTIAL: CPT | Performed by: NURSE PRACTITIONER

## 2025-07-28 PROCEDURE — 93454 CORONARY ARTERY ANGIO S&I: CPT | Performed by: INTERNAL MEDICINE

## 2025-07-28 PROCEDURE — 99152 MOD SED SAME PHYS/QHP 5/>YRS: CPT | Performed by: INTERNAL MEDICINE

## 2025-07-28 PROCEDURE — 2500000004 HC RX 250 GENERAL PHARMACY W/ HCPCS (ALT 636 FOR OP/ED): Performed by: INTERNAL MEDICINE

## 2025-07-28 PROCEDURE — 7100000009 HC PHASE TWO TIME - INITIAL BASE CHARGE: Performed by: INTERNAL MEDICINE

## 2025-07-28 PROCEDURE — 36415 COLL VENOUS BLD VENIPUNCTURE: CPT | Performed by: NURSE PRACTITIONER

## 2025-07-28 PROCEDURE — C1760 CLOSURE DEV, VASC: HCPCS | Performed by: INTERNAL MEDICINE

## 2025-07-28 PROCEDURE — G0269 OCCLUSIVE DEVICE IN VEIN ART: HCPCS | Mod: 59 | Performed by: INTERNAL MEDICINE

## 2025-07-28 PROCEDURE — 7100000010 HC PHASE TWO TIME - EACH INCREMENTAL 1 MINUTE: Performed by: INTERNAL MEDICINE

## 2025-07-28 PROCEDURE — 93010 ELECTROCARDIOGRAM REPORT: CPT | Performed by: INTERNAL MEDICINE

## 2025-07-28 PROCEDURE — 93005 ELECTROCARDIOGRAM TRACING: CPT

## 2025-07-28 PROCEDURE — 2500000001 HC RX 250 WO HCPCS SELF ADMINISTERED DRUGS (ALT 637 FOR MEDICARE OP): Performed by: NURSE PRACTITIONER

## 2025-07-28 PROCEDURE — 2720000007 HC OR 272 NO HCPCS: Performed by: INTERNAL MEDICINE

## 2025-07-28 PROCEDURE — 80048 BASIC METABOLIC PNL TOTAL CA: CPT | Performed by: NURSE PRACTITIONER

## 2025-07-28 PROCEDURE — 85027 COMPLETE CBC AUTOMATED: CPT | Performed by: NURSE PRACTITIONER

## 2025-07-28 RX ORDER — LIDOCAINE HYDROCHLORIDE 20 MG/ML
INJECTION, SOLUTION INFILTRATION; PERINEURAL AS NEEDED
Status: DISCONTINUED | OUTPATIENT
Start: 2025-07-28 | End: 2025-07-28 | Stop reason: HOSPADM

## 2025-07-28 RX ORDER — FENTANYL CITRATE 50 UG/ML
INJECTION, SOLUTION INTRAMUSCULAR; INTRAVENOUS AS NEEDED
Status: DISCONTINUED | OUTPATIENT
Start: 2025-07-28 | End: 2025-07-28 | Stop reason: HOSPADM

## 2025-07-28 RX ORDER — MIDAZOLAM HYDROCHLORIDE 1 MG/ML
INJECTION, SOLUTION INTRAMUSCULAR; INTRAVENOUS AS NEEDED
Status: DISCONTINUED | OUTPATIENT
Start: 2025-07-28 | End: 2025-07-28 | Stop reason: HOSPADM

## 2025-07-28 RX ORDER — ASPIRIN 325 MG
325 TABLET ORAL ONCE
Status: COMPLETED | OUTPATIENT
Start: 2025-07-28 | End: 2025-07-28

## 2025-07-28 RX ORDER — RANOLAZINE 500 MG/1
500 TABLET, EXTENDED RELEASE ORAL ONCE
Status: COMPLETED | OUTPATIENT
Start: 2025-07-28 | End: 2025-07-28

## 2025-07-28 RX ADMIN — ASPIRIN 325 MG: 325 TABLET ORAL at 08:56

## 2025-07-28 RX ADMIN — RANOLAZINE 500 MG: 500 TABLET, FILM COATED, EXTENDED RELEASE ORAL at 08:56

## 2025-07-28 ASSESSMENT — COLUMBIA-SUICIDE SEVERITY RATING SCALE - C-SSRS
6. HAVE YOU EVER DONE ANYTHING, STARTED TO DO ANYTHING, OR PREPARED TO DO ANYTHING TO END YOUR LIFE?: NO
1. IN THE PAST MONTH, HAVE YOU WISHED YOU WERE DEAD OR WISHED YOU COULD GO TO SLEEP AND NOT WAKE UP?: NO
2. HAVE YOU ACTUALLY HAD ANY THOUGHTS OF KILLING YOURSELF?: NO

## 2025-07-28 ASSESSMENT — PAIN - FUNCTIONAL ASSESSMENT: PAIN_FUNCTIONAL_ASSESSMENT: 0-10

## 2025-07-28 ASSESSMENT — PAIN SCALES - GENERAL: PAINLEVEL_OUTOF10: 1

## 2025-07-28 NOTE — POST-PROCEDURE NOTE
Physician Transition of Care Summary  Invasive Cardiovascular Lab    Procedure Date: 7/28/2025  Attending:    * Law Luo - Primary  Resident/Fellow/Other Assistant: Surgeons and Role:  * No surgeons found with a matching role *    Pre Procedure Diagnosis:   Diastolic congestive heart failure, aortic stenosis, CAD, remote stenting of proximal-mid LAD    Post Procedure Diagnosis:   CAD, mild, patent stent of LAD    Complications:   None    Stents/Implants:   None    Anticoagulation/Antiplatelet Plan:   Aspirin 81 mg a day, restart Eliquis tomorrow a.m.    Estimated Blood Loss:   0 mL    Electronically signed by: Law Luo MD, 7/28/2025 9:53 AM    Anesthesia: Moderate                            anesthesia Staff: None

## 2025-07-28 NOTE — SIGNIFICANT EVENT
Discharge instructions reviewed with pt including medications, follow up and arterial site care, verbalized understanding

## 2025-07-28 NOTE — DISCHARGE INSTRUCTIONS
- Restart Eliquis on 7/29/2025 with morning dose    CARDIAC CATHETERIZATION DISCHARGE INSTRUCTIONS     FOR SUDDEN AND SEVERE CHEST PAIN, SHORTNESS OF BREATH, EXCESSIVE BLEEDING, SIGNS OF STROKE, OR CHANGES IN MENTAL STATUS YOU SHOULD CALL 911 IMMEDIATELY.     FOR NEXT 24 HOURS  - Upon discharge, you should return home and rest for the remainder of the day and evening. You do not have to stay on bed rest but should not be very active.  It is recommended a responsible adult be with you for the first 24 hours after the procedure.    - No driving for 24 hours after procedure. Please arrange for someone to drive you home from the hospital today.     - Do not drive, operate machinery, or use power tools for 24 hours after your procedure.     - Do not make any legal decisions for 24 hours after your procedure.     - Do not drink alcoholic beverages for 24 hours after your procedure.    WOUND CARE   *FOR FEMORAL (LEG) ACCESS*  ·      Avoid heavy lifting (over 10 pounds) for 3 days, squatting or excessive bending for 2 days, and strenuous exercise for 7 days.  ·      No submerged bathing, swimming, or hot tubs for the next 7 days, or until fully healed.  ·      Avoid sexual activity for 3-4 days until any groin discomfort has ceased.      - The transparent dressing should be removed from the site 24 hours after the procedure.  Wash the site gently with soap and water. Rinse well and pat dry. Keep the area clean and dry. You may apply a Band-Aid to the site. Avoid lotions, ointments, or powders until fully healed.     - You may shower the day after your procedure.      - It is normal to notice a small bruise around the puncture site and/or a pea sized or smaller lump. Any large bruising or large lump warrants a call to the office.     - If bleeding should occur, lay down and apply pressure to the affected area for 10 minutes.  If the bleeding stops notify your physician.  If there is a large amount of bleeding or  spurting of blood CALL 911 immediately.  DO NOT drive yourself to the hospital.    - You may experience some tenderness, bruising or minimal inflammation.  If you have any concerns,  or if any of these symptoms become excessive, contact your cardiologist or go to the emergency room.     OTHER INSTRUCTIONS  - You may take acetaminophen (Tylenol) as directed for discomfort.  If pain is not relieved with acetaminophen (Tylenol), contact your doctor.    - If you notice or experience any of the following, you should notify your doctor or seek medical attention  Chest pain or discomfort  Change in mental status or weakness in extremities.  Dizziness, light headedness, or feeling faint.  Change in the site where the procedure was performed, such as bleeding or an increased area of bruising or swelling.  Tingling, numbness, pain, or coolness in the leg/arm beyond the site where the procedure was performed.  Signs of infection (i.e. shaking chills, temperature > 100 degrees Fahrenheit, warmth, redness) in the leg/arm area where the procedure was performed.  Changes in urination   Bloody or black stools  Vomiting blood  Severe nose bleeds  Any excessive bleeding    - If you DO NOT have an appointment with your cardiologist within 2-4 weeks following your procedure, please contact their office.

## 2025-07-30 LAB
ATRIAL RATE: 55 BPM
P AXIS: 23 DEGREES
P OFFSET: 177 MS
P ONSET: 120 MS
PR INTERVAL: 178 MS
Q ONSET: 209 MS
QRS COUNT: 9 BEATS
QRS DURATION: 94 MS
QT INTERVAL: 460 MS
QTC CALCULATION(BAZETT): 440 MS
QTC FREDERICIA: 446 MS
R AXIS: -14 DEGREES
T AXIS: 49 DEGREES
T OFFSET: 439 MS
VENTRICULAR RATE: 55 BPM

## 2025-08-06 ENCOUNTER — APPOINTMENT (OUTPATIENT)
Dept: CARDIOLOGY | Facility: CLINIC | Age: 75
End: 2025-08-06
Payer: MEDICARE

## 2025-08-06 VITALS
HEART RATE: 67 BPM | HEIGHT: 70 IN | SYSTOLIC BLOOD PRESSURE: 110 MMHG | BODY MASS INDEX: 35.79 KG/M2 | DIASTOLIC BLOOD PRESSURE: 70 MMHG | WEIGHT: 250 LBS

## 2025-08-06 DIAGNOSIS — I48.0 PAROXYSMAL ATRIAL FIBRILLATION (MULTI): Primary | ICD-10-CM

## 2025-08-06 DIAGNOSIS — I50.32 CHRONIC DIASTOLIC (CONGESTIVE) HEART FAILURE: ICD-10-CM

## 2025-08-06 DIAGNOSIS — I10 BENIGN ESSENTIAL HYPERTENSION: ICD-10-CM

## 2025-08-06 DIAGNOSIS — I35.0 MODERATE AORTIC STENOSIS: ICD-10-CM

## 2025-08-06 DIAGNOSIS — I25.10 CAD S/P PERCUTANEOUS CORONARY ANGIOPLASTY: ICD-10-CM

## 2025-08-06 DIAGNOSIS — I27.20 PULMONARY HYPERTENSION (MULTI): ICD-10-CM

## 2025-08-06 DIAGNOSIS — Z87.891 FORMER SMOKER: ICD-10-CM

## 2025-08-06 DIAGNOSIS — Z98.61 CAD S/P PERCUTANEOUS CORONARY ANGIOPLASTY: ICD-10-CM

## 2025-08-06 PROCEDURE — 1159F MED LIST DOCD IN RCRD: CPT | Performed by: INTERNAL MEDICINE

## 2025-08-06 PROCEDURE — 3074F SYST BP LT 130 MM HG: CPT | Performed by: INTERNAL MEDICINE

## 2025-08-06 PROCEDURE — 3078F DIAST BP <80 MM HG: CPT | Performed by: INTERNAL MEDICINE

## 2025-08-06 PROCEDURE — 4010F ACE/ARB THERAPY RXD/TAKEN: CPT | Performed by: INTERNAL MEDICINE

## 2025-08-06 PROCEDURE — 99214 OFFICE O/P EST MOD 30 MIN: CPT | Performed by: INTERNAL MEDICINE

## 2025-08-06 PROCEDURE — 93000 ELECTROCARDIOGRAM COMPLETE: CPT | Performed by: INTERNAL MEDICINE

## 2025-08-06 RX ORDER — SOTALOL HYDROCHLORIDE 120 MG/1
120 TABLET ORAL 2 TIMES DAILY
Qty: 180 TABLET | Refills: 3 | Status: SHIPPED | OUTPATIENT
Start: 2025-08-06 | End: 2026-08-06

## 2025-08-06 ASSESSMENT — ENCOUNTER SYMPTOMS
DYSPNEA ON EXERTION: 1
PALPITATIONS: 0

## 2025-08-06 NOTE — ASSESSMENT & PLAN NOTE
Patient with COPD that is well controlled, stable, and asymptomatic. Based on CAT score, exacerbation history, and eosinophil count, patient is GOLD Group E and triple therapy is recommended.     Medication Changes: None    CONTINUE  Albuterol HFA inhaler PRN  Breztri 160/9/4.8 mc puffs BID    Monitoring and Education:  Breztri Aerosphere Education:  Counseled patient on Breztri Aerosphere MOA, expectations, side effects, duration of therapy, administration, and monitoring parameters.  Priming instructions:  Prime inhaler if it is new, has not been used in 7 weeks, or if you drop it.   Remove cap, shake inhaler for 5 seconds, and spray it once away from you; repeat for a total of 4 sprays.  Administration instructions  Remove cap. Place middle or index finger on top of canister and thumb underneath the mouthpiece of the inhaler.   Shake inhaler for 5-10 seconds.  Breathe out fully away from the mouthpiece (in preparation to breathe in medication).  Put the mouthpiece in your mouth and close your lips around it. Do not block the mouthpiece with your teeth or tongue.  Push the top of the canister all the way down one time while breathing in deeply and slowly through your mouth.  Hold your breath for up to 10 seconds, and then breathe out again away from the inhaler.  If your physician has prescribed more than one dose (puff), wait 30 seconds and repeat as above.  Put the cap back on.   To clean, remove cannister from plastic container. Run water through plastic container upside down for 30 seconds. Shake off any excess water and let air dry overnight. Reassemble inhaler.  If inhaler contains a steroid, rinse mouth with water after use. Spit out water. Do not swallow.  Side effects include: nose/throat irritation, oral candidiasis, anxiety, high heart rate, dry mouth and/or bitter taste  All questions and concerns addressed.

## 2025-08-06 NOTE — PROGRESS NOTES
CARDIOLOGY OFFICE VISIT      CHIEF COMPLAINT  Chief Complaint   Patient presents with    Follow-up     Pt is here today following up for routine 6 month        HISTORY OF PRESENT ILLNESS  HPI  75-year-old male with a past medical history of coronary artery disease status post PCI of the mid LAD and angioplasty of the first diagonal in April 2017, normal left ventricular function, mild ascending aortic enlargement, moderate aortic stenosis, hyperlipidemia, obesity, COPD, hypertension and COVID ascending infection in December 2024.  Patient states that lately he has been noticing more episodes of palpitations.  Looking at his records he used to see the electrophysiology service at [facility] until 2019.  He lost completely follow-up with us.  He was placed on sotalol at that time due to evidence of atrial fibrillation diagnosed back in 2016.     Patient again lately still complaining of more palpitations.  Primary cardiology service ordered a Holter monitor that shows initial rhythm on Holter monitor was atrial fibrillation with rates controlled.  This arrhythmia stay for approximately 5 hours after initiation of the study and after that patient converted back to sinus rhythm.  No recurrence of this arrhythmia during the rest of the study.      Echocardiogram December 30, 2024     CONCLUSIONS:   1. The left ventricular systolic function is normal, with a visually estimated ejection fraction of 60%.   2. No regional wall motion abnormalities.   3. There is normal right ventricular global systolic function.   4. There is no evidence of mitral valve stenosis.   5. Mild mitral valve regurgitation.   6. Mild tricuspid regurgitation is visualized.   7. Moderate aortic valve stenosis.     Cardiac catheterization March 2022     CONCLUSIONS:   1. The left ventricular systolic function is normal, with a visually estimated ejection fraction of 60%.   2. No regional wall motion abnormalities.   3. There is normal  right ventricular global systolic function.   4. There is no evidence of mitral valve stenosis.   5. Mild mitral valve regurgitation.   6. Mild tricuspid regurgitation is visualized.   7. Moderate aortic valve stenosis.        Hydrochlorothiazide was discontinued.  The dose of lisinopril was cut to 10 mg 1 tablet twice a day.  He states that his blood pressures are much better controlled.  He brought his blood pressure readings today and they are between 120-140 mmHg systolic.      Patient was admitted to [facility] in July 2025.  He was presenting with worsening shortness of breath.  Patient states he has noticed worsening shortness of breath over the past couple weeks.  Patient follows cardiology as outpatient.  Patient states he was taking his blood pressure twice a day and noticed it was in the 100s called his doctor and cut back on some of his BP meds.  Yesterday he did not feel well and took his blood pressure and it was elevated so he took an additional dose of his medication.  This morning blood pressure was 180 and he was worsening shortness of breath unable to ambulate 10 feet.  Patient called his physician and was instructed to go to the emergency room.  Patient denies chest pain.  Denies abdominal pain, nausea, vomiting or diarrhea.  Per his wife he noticed that his abdomen was slightly distended prior to coming to the emergency room.  Denies fever or chills.   troponins negative.  CXR shows cardiomegaly unchanged.  No consolidation or edema.  Patient was given 40 mg Lasix in the emergency room with not much improvement.  Patient will be admitted for further evaluation and treatment.  Patient continued on Lasix and showed much improvement and was able to ambulate greater than 10 feet.     Cardiac catheterization July 2025  CONCLUSIONS:   1. The entire Left Main: <10% stenosis.   2. Left Anterior Descending Artery: contains patent previously placed stents.   3. Proximal and mid LAD  Lesion: The percent stenosis is 10-30%.   4. Mid CX Lesion: The percent stenosis is 10-30%.   5. Proximal, mid and distal RCA Lesion: The percent stenosis is 10-30%.     Since the discharge from the hospital, he has been doing well.  He still have occasional tiredness and fatigue during his daily activities.  There is a pending echocardiogram and possibility of evaluation by structural heart team regarding valve disease.    EKG performed today shows sinus rhythm right bundle branch block at a rate of 67 bpm QRS duration 134 ms QT corrected 507 ms.  Rhythm strip shows the same pattern.    Patient still using sotalol 120 mg 1 tablet twice a day.          Past Medical History  Medical History[1]    Social History  Social History[2]    Family History   Family History[3]     Allergies:  RX Allergies[4]     Outpatient Medications:  Current Outpatient Medications   Medication Instructions    albuterol 90 mcg/actuation aerosol powdr breath activated inhaler 2 puffs, Every 6 hours PRN    apixaban (ELIQUIS) 5 mg, oral, 2 times daily, Restart with morning dose on 7/29/2025    aspirin 81 mg chewable tablet 1 tablet, Daily    atorvastatin (LIPITOR) 40 mg, Nightly    budesonide-glycopyr-formoterol (Breztri Aerosphere) 160-9-4.8 mcg/actuation HFA aerosol inhaler 2 puffs, 2 times daily    cetirizine (ZYRTEC) 10 mg, Daily    fluticasone (Flonase) 50 mcg/actuation nasal spray 2 sprays, Daily    furosemide (LASIX) 20 mg, oral, Daily, If weight up greater than 5 pounds take 1 additional tablet    lidocaine (LMX) 4 % cream 1 Application, Daily PRN    lisinopril 5 mg, oral, Daily    nitroglycerin (Nitrostat) 0.4 mg SL tablet Place 1 tablet under the tongue every 5 minutes as needed for Chest pain. May repeat up to 3 times. Call 911 if pain persists.    omeprazole OTC (PriLOSEC OTC) 20 mg EC tablet 1 tablet, Daily before breakfast    sodium chloride (Ocean) 0.65 % nasal spray 1 spray, 4 times daily PRN    sotalol (BETAPACE) 120 mg, oral,  2 times daily    spironolactone (Aldactone) 25 mg tablet 1 tablet, Daily    tadalafil 20 mg, oral, Daily PRN    white petrolatum-mineral oiL (Tears Naturale PM) 94-3 % ophthalmic ointment 1 Application, Nightly          REVIEW OF SYSTEMS  Review of Systems   Cardiovascular:  Positive for dyspnea on exertion. Negative for chest pain and palpitations.   All other systems reviewed and are negative.        VITALS  Vitals:    08/06/25 1020   BP: 110/70   Pulse: 67       PHYSICAL EXAM  Constitutional:       General: Awake.      Appearance: Normal and healthy appearance. Well-developed and not in distress. Obese.   Neck:      Vascular: No JVR. JVD normal.   Pulmonary:      Effort: Pulmonary effort is normal.      Breath sounds: Normal breath sounds. No wheezing. No rhonchi. No rales.   Chest:      Chest wall: Not tender to palpatation.   Cardiovascular:      PMI at left midclavicular line. Normal rate. Regular rhythm. Normal S1. Normal S2.       Murmurs: There is a grade 2/6 systolic murmur at the ULSB.      No gallop.  No click. No rub.   Pulses:     Intact distal pulses.   Edema:     Peripheral edema absent.   Abdominal:      Tenderness: There is no abdominal tenderness.   Musculoskeletal: Normal range of motion.         General: No tenderness. Skin:     General: Skin is warm and dry.   Neurological:      General: No focal deficit present.      Mental Status: Alert and oriented to person, place and time.         ASSESSMENT AND PLAN    Clinical impression     1.  Palpitations  2.  Persisting atrial fibrillation  3.  High risk medication (sotalol)  4.  Normal left ventricular function per echogram described above  5.  Coronary artery disease with percutaneous coronary interventions in the past as described above  6.  Hypertension  7.  Hyperlipidemia  8.  Mild to moderate aortic stenosis    Plan-recommendations    From the electrophysiologist on point he is doing well we will continue with sotalol therapy at a dose of 120  mg 1 tablet twice a day.    Follow my office every 6 months or sooner needed.    I agree with echocardiogram for evaluation of valvular heart disease and possibility of evaluation by Dr. Sullivan in the next few weeks.    Risk factor modification and lifestyle modification discussed with patient. Diet , exercise and hydration discussed with patient.    I have personally review with patient during this office visit, laboratory data, echocardiogram results, stress test results, Holter-event monitor results prior and after the last electrophysiology visit. All questions has been answered.    Please excuse any errors in grammar or translation related to this dictation.  Voice recognition software was utilized to prepare this document.      I, Dr. Simpson, personally performed the services described in the documentation as scribed by the nurse in my presence, and confirm it is both accurate and complete.            [1]   Past Medical History:  Diagnosis Date    Arcus senilis, bilateral 07/17/2018    Corneal arcus senilis, bilateral    Arcus senilis, bilateral 07/17/2018    Corneal arcus senilis, bilateral    Atrial fibrillation (Multi)     Combined forms of age-related cataract, bilateral 07/17/2018    Combined form of age-related cataract, both eyes    COPD (chronic obstructive pulmonary disease) (Multi)     Coronary artery disease     Dry eye syndrome of unspecified lacrimal gland 07/17/2018    Dry eye syndrome    Dry eye syndrome of unspecified lacrimal gland 07/17/2018    Dry eye syndrome    Hyperlipidemia     Hypertension     Personal history of other diseases of the circulatory system 04/20/2016    History of atrial fibrillation    Personal history of other diseases of the musculoskeletal system and connective tissue     History of osteoarthritis    Personal history of other diseases of the nervous system and sense organs 04/20/2016    History of cataract    Presence of intraocular lens 07/17/2018    Pseudophakia of  right eye    Presence of intraocular lens 2018    Pseudophakia of right eye    Strain of muscle(s) and tendon(s) of the rotator cuff of left shoulder, initial encounter 10/22/2019    Traumatic complete tear of left rotator cuff, initial encounter    Strain of muscle(s) and tendon(s) of the rotator cuff of left shoulder, subsequent encounter 2020    Traumatic complete tear of left rotator cuff, subsequent encounter    Unspecified cataract 2016    Cataract of right eye   [2]   Social History  Tobacco Use    Smoking status: Former     Current packs/day: 0.00     Types: Cigarettes     Quit date:      Years since quittin.     Passive exposure: Never    Smokeless tobacco: Never   Substance Use Topics    Alcohol use: Not Currently     Comment: quit in     Drug use: Never   [3]   Family History  Problem Relation Name Age of Onset    Hypertension Mother      Atrial fibrillation Mother      Cataracts Mother      Glaucoma Mother      Heart attack Father      Cancer Father     [4]   Allergies  Allergen Reactions    Jardiance [Empagliflozin] Rash     Yeast infection of the foreskin.

## 2025-08-06 NOTE — PROGRESS NOTES
"  Clinical Pharmacy Appointment    Patient ID: Elroy Edmonds \"Len\" is a 75 y.o. male who presents for COPD.    Referring Provider: Ben Diaz MD     Subjective   PULMONARY ASSESSMENT  Patient has been diagnosed with: Emphysema  Does patient see pulmonology: No  has had PFT's completed in last 2 years  Last done 24    Current Regimen  Albuterol HFA inhaler PRN  Breztri 160/9/4.8 mc puffs BID    Clarifications to above regimen: Has not used Albuterol lately; only uses on occasion with physical exertion   Adverse Effects: Denies   Appropriate technique? Yes    Historical Treatment  None    Symptom Management  Current symptoms: denies  Triggers: N/A  Alleviating factors: N/A  Exercise capacity: not limited per patient  How often do you use your rescue inhaler? Not often; only when needed during physical exertion  mMRC score: 0-1    Exacerbation Hx  When was your last hospitalization for an exacerbation? 2025  When was the last time you were treated with antibiotics and/or steroids? 2025   Total number of hospitalizations d/t exacerbation:    Immunization History:  Influenza: due  PCV13: Date [10/1/10]  PPSV23: Date [9/16/15, 16]  COVID: Date [21, 21]  RSV: due    Smoking History  He quit smoking approximately 25 years ago.    CONGESTIVE HEART FAILURE ASSESSMENT  Does patient follow with Cardiology: Yes, w/ Dr. Manzano  Last Visit: 25    Staging  Ejection Fraction: 60% (24)  NYHA Class: Class II - Slight limitation to physical activity  ACC/AHA Stage: B - Structural disease without SxS    Symptom Assessment  Weight changes/edema?: No  Dyspnea?: None  Dizziness/syncope/palpitations?: No    Medication Therapy  Current Regimen (GDMT):  ARNI/ACEi/ARB: Yes - Lisinopril 5 mg  Beta Blocker: Yes - Sotalol 120 mg BID  MRA: Yes - Spironolactone 25 mg daily  SGLT2i: No    Other Therapy:  Furosemide 20 mg daily    Previous Medications: None    Clarifications to above regimen: Takes " Lisinopril only if BP is high-does not take if BP measuring low  Adverse Effects: Denies     Secondary Prevention  The ASCVD Risk score (Melissa ESCOTO, et al., 2019) failed to calculate for the following reasons:    The valid total cholesterol range is 130 to 320 mg/dL  Aspirin 81mg? yes  Statin?: Yes - Atorvastatin 40 mg  HTN?: Yes - controlled, last 110/90 mmHg on 25     Drug Interactions  No relevant drug interactions were noted.    Medication System Management  [facility]  Adherence/Organization: no concerns  Affordability/Accessibility: [facility]; no issues with cost at this time    Objective     There were no vitals taken for this visit.     Labs  Lab Results   Component Value Date    BILITOT 1.3 (H) 2025    CALCIUM 9.1 2025    CO2 28 2025     2025    CREATININE 1.18 2025    GLUCOSE 107 (H) 2025    ALKPHOS 88 2025    K 4.3 2025    PROT 7.0 2025     2025    AST 20 2025    ALT 22 2025    BUN 24 (H) 2025    ANIONGAP 9 (L) 2025    MG 1.90 2025    ALBUMIN 4.0 2025    GFRMALE 69 2023     Lab Results   Component Value Date    TRIG 113 2023    CHOL 125 2023    HDL 35.5 (A) 2023     Lab Results   Component Value Date    HGBA1C 6.4 (H) 2025       Medications Ordered Prior to Encounter[1]     Assessment/Plan   Problem List Items Addressed This Visit           ICD-10-CM    Chronic respiratory failure with hypoxia - Primary J96.11    Patient with COPD that is well controlled, stable, and asymptomatic. Based on CAT score, exacerbation history, and eosinophil count, patient is GOLD Group E and triple therapy is recommended.     Medication Changes: None    CONTINUE  Albuterol HFA inhaler PRN  Breztri 160/9/4.8 mc puffs BID    Monitoring and Education:  Breztri Aerosphere Education:  Counseled patient  on Breztri Aerosphere MOA, expectations, side effects, duration of therapy, administration, and monitoring parameters.  Priming instructions:  Prime inhaler if it is new, has not been used in 7 weeks, or if you drop it.   Remove cap, shake inhaler for 5 seconds, and spray it once away from you; repeat for a total of 4 sprays.  Administration instructions  Remove cap. Place middle or index finger on top of canister and thumb underneath the mouthpiece of the inhaler.   Shake inhaler for 5-10 seconds.  Breathe out fully away from the mouthpiece (in preparation to breathe in medication).  Put the mouthpiece in your mouth and close your lips around it. Do not block the mouthpiece with your teeth or tongue.  Push the top of the canister all the way down one time while breathing in deeply and slowly through your mouth.  Hold your breath for up to 10 seconds, and then breathe out again away from the inhaler.  If your physician has prescribed more than one dose (puff), wait 30 seconds and repeat as above.  Put the cap back on.   To clean, remove cannister from plastic container. Run water through plastic container upside down for 30 seconds. Shake off any excess water and let air dry overnight. Reassemble inhaler.  If inhaler contains a steroid, rinse mouth with water after use. Spit out water. Do not swallow.  Side effects include: nose/throat irritation, oral candidiasis, anxiety, high heart rate, dry mouth and/or bitter taste  All questions and concerns addressed.          Gissell Yeung PharmD  Clinical Ambulatory Care Pharmacist  Ph: [phone]    Continue all meds under the continuation of care with the referring provider and clinical pharmacy team.    Clinical Pharmacist follow up: As needed based on clinical intervention  Type of Encounter:  Telephone    Verbal consent to manage patient's drug therapy was obtained from the patient. They were informed they may decline to participate or withdraw from participation  in pharmacy services at any time.       [1]   Current Outpatient Medications on File Prior to Visit   Medication Sig Dispense Refill    albuterol 90 mcg/actuation aerosol powdr breath activated inhaler Inhale 2 puffs every 6 hours if needed for wheezing or shortness of breath.      aspirin 81 mg chewable tablet Chew and swallow 1 tablet (81 mg) once daily.      atorvastatin (Lipitor) 40 mg tablet Take 1 tablet (40 mg) by mouth once daily at bedtime.      budesonide-glycopyr-formoterol (Breztri Aerosphere) 160-9-4.8 mcg/actuation HFA aerosol inhaler Inhale 2 puffs 2 times a day.      cetirizine (ZyrTEC) 10 mg chewable tablet Chew and swallow 1 tablet (10 mg) once daily.      fluticasone (Flonase) 50 mcg/actuation nasal spray Administer 2 sprays into each nostril once daily. Shake gently. Before first use, prime pump. After use, clean tip and replace cap.      furosemide (Lasix) 20 mg tablet Take 1 tablet (20 mg) by mouth once daily. If weight up greater than 5 pounds take 1 additional tablet 30 tablet 11    lidocaine (LMX) 4 % cream Apply 0.1 g topically once daily as needed for mild pain (1 - 3).      lisinopril 5 mg tablet Take 1 tablet (5 mg) by mouth once daily. 30 tablet 11    nitroglycerin (Nitrostat) 0.4 mg SL tablet Place 1 tablet under the tongue every 5 minutes as needed for Chest pain. May repeat up to 3 times. Call 911 if pain persists. 25 tablet 1    omeprazole OTC (PriLOSEC OTC) 20 mg EC tablet Take 1 tablet (20 mg) by mouth once daily in the morning. Take before meals.      sodium chloride (Ocean) 0.65 % nasal spray Administer 1 spray into each nostril 4 times a day as needed for congestion.      tadalafil (Cialis) 20 mg tablet Take 1 tablet (20 mg) by mouth once daily as needed for erectile dysfunction. 12 tablet 3    white petrolatum-mineral oiL (Tears Naturale PM) 94-3 % ophthalmic ointment Apply 1 Application to both eyes once daily at bedtime.      [DISCONTINUED] metoprolol succinate XL  (Toprol-XL) 50 mg 24 hr tablet Take 1 tablet (50 mg) by mouth once daily. Do not crush or chew. (Patient not taking: Reported on 8/6/2025)      [DISCONTINUED] sotalol (Betapace) 120 mg tablet Take 1 tablet (120 mg) by mouth 2 times a day. (Patient taking differently: Take 1 tablet (120 mg) by mouth 2 times a day. [facility] mg) 180 tablet 3    spironolactone (Aldactone) 25 mg tablet Take 1 tablet (25 mg) by mouth once daily.      [DISCONTINUED] apixaban (Eliquis) 5 mg tablet Take 1 tablet (5 mg) by mouth 2 times a day. Restart with morning dose on 7/29/2025       No current facility-administered medications on file prior to visit.

## 2025-08-06 NOTE — ASSESSMENT & PLAN NOTE
Patient has Stage B Class II HFpEF with most recent EF 60%. Patient is on complete GDMT.     Rationale for plan: Patient is stable with no exacerbating symptoms. Continue following with cardiology as scheduled.     Medication Changes: None    CONTINUE:  Lisinopril 5 mg daily  Spironolactone 25 mg daily  Sotalol 120 mg BID    Monitoring and Education:  Weigh yourself without clothing daily after using the bathroom first thing in the morning before breakfast   Contact your physician/seek help immediately if you notice the following with symptoms of shortness of breath or swelling in your extremities:   Weight gain of 3+ lbs overnight   Weight gain of 5+ lbs in a week   Physical limitations to your normal physical activity level   Limit fluid intake as instructed by your doctor and follow a heart friendly diet low in salt, fat, and focused in lean meats   Aim to exercise for 30 minutes anywhere between 3 to 5 times a week or more, depending on your physical limitations   Keep a log of your daily BP, HR and weight to share with providers   If you are a smoker or drink alcohol, consider cessation to improve your heart health

## 2025-08-20 ENCOUNTER — PATIENT OUTREACH (OUTPATIENT)
Dept: PRIMARY CARE | Facility: CLINIC | Age: 75
End: 2025-08-20
Payer: MEDICARE

## 2025-08-26 ENCOUNTER — HOSPITAL ENCOUNTER (OUTPATIENT)
Dept: CARDIOLOGY | Facility: CLINIC | Age: 75
Discharge: HOME | End: 2025-08-26
Payer: MEDICARE

## 2025-08-26 DIAGNOSIS — I35.0 NONRHEUMATIC AORTIC (VALVE) STENOSIS: ICD-10-CM

## 2025-08-26 LAB
AORTIC VALVE MEAN GRADIENT: 25 MMHG
AORTIC VALVE PEAK VELOCITY: 3.23 M/S
AV PEAK GRADIENT: 42 MMHG
AVA (PEAK VEL): 0.91 CM2
AVA (VTI): 0.94 CM2
EJECTION FRACTION APICAL 4 CHAMBER: 63.4
EJECTION FRACTION: 63 %
LEFT VENTRICLE INTERNAL DIMENSION DIASTOLE: 5.1 CM (ref 3.5–6)
LEFT VENTRICULAR OUTFLOW TRACT DIAMETER: 2.1 CM
LV EJECTION FRACTION BIPLANE: 59 %
MITRAL VALVE E/A RATIO: 0.7
MITRAL VALVE E/E' RATIO: 10.8
RIGHT VENTRICLE FREE WALL PEAK S': 15.6 CM/S
RIGHT VENTRICLE PEAK SYSTOLIC PRESSURE: 36 MMHG
TRICUSPID ANNULAR PLANE SYSTOLIC EXCURSION: 2.3 CM

## 2025-08-26 PROCEDURE — 93306 TTE W/DOPPLER COMPLETE: CPT

## 2025-08-26 PROCEDURE — 93306 TTE W/DOPPLER COMPLETE: CPT | Performed by: INTERNAL MEDICINE

## 2025-08-29 ENCOUNTER — OFFICE VISIT (OUTPATIENT)
Dept: CARDIOLOGY | Facility: CLINIC | Age: 75
End: 2025-08-29
Payer: MEDICARE

## 2025-08-29 ENCOUNTER — APPOINTMENT (OUTPATIENT)
Dept: CARDIOLOGY | Facility: CLINIC | Age: 75
End: 2025-08-29
Payer: MEDICARE

## 2025-08-29 VITALS
DIASTOLIC BLOOD PRESSURE: 74 MMHG | BODY MASS INDEX: 36.79 KG/M2 | WEIGHT: 257 LBS | SYSTOLIC BLOOD PRESSURE: 132 MMHG | HEART RATE: 64 BPM | HEIGHT: 70 IN

## 2025-08-29 DIAGNOSIS — I35.0 MODERATE AORTIC STENOSIS: ICD-10-CM

## 2025-08-29 DIAGNOSIS — R06.02 SOB (SHORTNESS OF BREATH): ICD-10-CM

## 2025-08-29 PROCEDURE — 1036F TOBACCO NON-USER: CPT

## 2025-08-29 PROCEDURE — 4010F ACE/ARB THERAPY RXD/TAKEN: CPT

## 2025-08-29 PROCEDURE — 99215 OFFICE O/P EST HI 40 MIN: CPT

## 2025-08-29 PROCEDURE — 3075F SYST BP GE 130 - 139MM HG: CPT

## 2025-08-29 PROCEDURE — 1159F MED LIST DOCD IN RCRD: CPT

## 2025-08-29 PROCEDURE — 3044F HG A1C LEVEL LT 7.0%: CPT

## 2025-08-29 PROCEDURE — 3078F DIAST BP <80 MM HG: CPT

## 2025-08-29 ASSESSMENT — COLUMBIA-SUICIDE SEVERITY RATING SCALE - C-SSRS
2. HAVE YOU ACTUALLY HAD ANY THOUGHTS OF KILLING YOURSELF?: NO
6. HAVE YOU EVER DONE ANYTHING, STARTED TO DO ANYTHING, OR PREPARED TO DO ANYTHING TO END YOUR LIFE?: NO
1. IN THE PAST MONTH, HAVE YOU WISHED YOU WERE DEAD OR WISHED YOU COULD GO TO SLEEP AND NOT WAKE UP?: NO

## 2025-08-30 LAB
ANION GAP SERPL CALCULATED.4IONS-SCNC: 12 MMOL/L (CALC) (ref 7–17)
BUN SERPL-MCNC: 25 MG/DL (ref 7–25)
BUN/CREAT SERPL: NORMAL (CALC) (ref 6–22)
CALCIUM SERPL-MCNC: 9.1 MG/DL (ref 8.6–10.3)
CHLORIDE SERPL-SCNC: 102 MMOL/L (ref 98–110)
CO2 SERPL-SCNC: 24 MMOL/L (ref 20–32)
CREAT SERPL-MCNC: 1.08 MG/DL (ref 0.7–1.28)
EGFRCR SERPLBLD CKD-EPI 2021: 72 ML/MIN/1.73M2
GLUCOSE SERPL-MCNC: 108 MG/DL (ref 65–139)
POTASSIUM SERPL-SCNC: 4.5 MMOL/L (ref 3.5–5.3)
SODIUM SERPL-SCNC: 138 MMOL/L (ref 135–146)

## 2025-09-03 ENCOUNTER — TELEPHONE (OUTPATIENT)
Dept: CARDIOLOGY | Facility: CLINIC | Age: 75
End: 2025-09-03
Payer: MEDICARE

## 2025-09-05 ENCOUNTER — HOSPITAL ENCOUNTER (OUTPATIENT)
Dept: RADIOLOGY | Facility: HOSPITAL | Age: 75
Discharge: HOME | End: 2025-09-05
Payer: MEDICARE

## 2025-09-05 DIAGNOSIS — I35.0 MODERATE AORTIC STENOSIS: ICD-10-CM

## 2025-09-05 DIAGNOSIS — R06.02 SOB (SHORTNESS OF BREATH): ICD-10-CM

## 2025-09-05 PROCEDURE — 74174 CTA ABD&PLVS W/CONTRAST: CPT

## 2025-09-05 PROCEDURE — 76937 US GUIDE VASCULAR ACCESS: CPT

## 2025-09-05 PROCEDURE — 2550000001 HC RX 255 CONTRASTS

## 2025-09-05 RX ADMIN — IOHEXOL 100 ML: 350 INJECTION, SOLUTION INTRAVENOUS at 09:57

## 2025-09-23 ENCOUNTER — APPOINTMENT (OUTPATIENT)
Dept: PRIMARY CARE | Facility: CLINIC | Age: 75
End: 2025-09-23
Payer: MEDICARE

## 2026-01-12 ENCOUNTER — APPOINTMENT (OUTPATIENT)
Dept: CARDIOLOGY | Facility: CLINIC | Age: 76
End: 2026-01-12
Payer: MEDICARE

## 2026-02-09 ENCOUNTER — APPOINTMENT (OUTPATIENT)
Dept: CARDIOLOGY | Facility: CLINIC | Age: 76
End: 2026-02-09
Payer: MEDICARE

## (undated) DEVICE — CATHETER, THERMODILUTION, SWAN GANZ, 7 FR, 110CM, STANDARD

## (undated) DEVICE — SHEATH, PINNACLE, W/.038 GW 10CM, 5FR INTRODUCER, 2.5 CM DIALATOR

## (undated) DEVICE — CATHETER, INFINITI DIAGNOSTIC, 5 FR 100CM 3DRC, WILLIAMS RIGHT OR NO TORQUE

## (undated) DEVICE — CATHETER, INFINITI DIAGNOSTIC, JUDKINS, LEFT, 5 FR-JR 4.5

## (undated) DEVICE — SHEATH, PINNACLE, 10 CM,  7FR INTRODUCER, 7FR DIA, 2.5 CM DIALATOR

## (undated) DEVICE — BANDAGE, QUIKCLOT, HEMO RADIAL

## (undated) DEVICE — CLOSURE SYSTEM, VASCULAR, VASCADE, 5 F

## (undated) DEVICE — TUBING, MANIFOLD, LOW PRESSURE